# Patient Record
Sex: FEMALE | Race: WHITE | Employment: PART TIME | URBAN - METROPOLITAN AREA
[De-identification: names, ages, dates, MRNs, and addresses within clinical notes are randomized per-mention and may not be internally consistent; named-entity substitution may affect disease eponyms.]

---

## 2017-01-11 ENCOUNTER — HOSPITAL ENCOUNTER (OUTPATIENT)
Dept: OTHER | Age: 67
Discharge: HOME | End: 2017-01-11
Attending: INTERNAL MEDICINE | Admitting: INTERNAL MEDICINE

## 2017-01-11 LAB
ALBUMIN SERPL-MCNC: 3.8 GM/DL (ref 3.4–5)
ALP BLD-CCNC: 77 IU/L (ref 40–129)
ALT SERPL-CCNC: 17 U/L (ref 10–40)
ANION GAP SERPL CALCULATED.3IONS-SCNC: 11 MMOL/L (ref 4–16)
AST SERPL-CCNC: 19 IU/L (ref 15–37)
BILIRUB SERPL-MCNC: 0.3 MG/DL (ref 0–1)
BUN BLDV-MCNC: 15 MG/DL (ref 6–23)
CALCIUM SERPL-MCNC: 8.5 MG/DL (ref 8.3–10.6)
CHLORIDE BLD-SCNC: 107 MMOL/L (ref 99–110)
CO2: 26 MMOL/L (ref 21–32)
CREAT SERPL-MCNC: 0.9 MG/DL (ref 0.6–1.1)
GFR AFRICAN AMERICAN: >60 ML/MIN/1.73M2
GFR NON-AFRICAN AMERICAN: >60 ML/MIN/1.73M2
GLUCOSE FASTING: 93 MG/DL (ref 70–99)
POTASSIUM SERPL-SCNC: 4.2 MMOL/L (ref 3.5–5.1)
SODIUM BLD-SCNC: 144 MMOL/L (ref 135–145)
TOTAL PROTEIN: 6.4 GM/DL (ref 6.4–8.2)

## 2017-01-13 LAB
ALBUMIN ELP: 3 GM/DL (ref 3.2–5.6)
ALPHA-1-GLOBULIN: 0.3 GM/DL (ref 0.1–0.4)
ALPHA-2-GLOBULIN: 0.7 GM/DL (ref 0.4–1.2)
BETA GLOBULIN: 1.3 GM/DL (ref 0.5–1.3)
GAMMA GLOBULIN: 1.1 GM/DL (ref 0.5–1.6)
IMMUNOFIXATION ELECTROPHORESIS 1: NORMAL
PATHOLOGIST: ABNORMAL
TOTAL PROTEIN: 6.4 GM/DL (ref 6.4–8.2)

## 2017-01-15 LAB
KAPPA QUANT FREE LIGHT CHAINS: 5.55
KAPPA/LAMBDA FREE LIGHT CHAIN RATIO: 1.83
LAMBDA FREE LIGHT CHAINS URINE/ VOL: 3.04

## 2017-01-18 ENCOUNTER — HOSPITAL ENCOUNTER (OUTPATIENT)
Dept: OTHER | Age: 67
Discharge: HOME | End: 2017-01-18
Attending: INTERNAL MEDICINE | Admitting: INTERNAL MEDICINE

## 2017-01-18 ENCOUNTER — HOSPITAL ENCOUNTER (OUTPATIENT)
Dept: ULTRASOUND IMAGING | Age: 67
Discharge: HOME | End: 2017-01-18
Attending: INTERNAL MEDICINE | Admitting: INTERNAL MEDICINE

## 2017-01-18 DIAGNOSIS — M79.89 OTHER SPECIFIED SOFT TISSUE DISORDERS: ICD-10-CM

## 2017-01-18 DIAGNOSIS — C90.00 MULTIPLE MYELOMA, WITHOUT MENTION OF HAVING ACHIEVED REMISSION: ICD-10-CM

## 2017-01-18 DIAGNOSIS — M79.89 SWELLING OF LIMB: ICD-10-CM

## 2017-01-18 LAB
ALBUMIN SERPL-MCNC: 3.7 GM/DL (ref 3.4–5)
ALP BLD-CCNC: 83 IU/L (ref 40–128)
ALT SERPL-CCNC: 17 U/L (ref 10–40)
ANION GAP SERPL CALCULATED.3IONS-SCNC: 13 MMOL/L (ref 4–16)
AST SERPL-CCNC: 18 IU/L (ref 15–37)
BILIRUB SERPL-MCNC: 0.4 MG/DL (ref 0–1)
BUN BLDV-MCNC: 21 MG/DL (ref 6–23)
CALCIUM SERPL-MCNC: 9 MG/DL (ref 8.3–10.6)
CHLORIDE BLD-SCNC: 102 MMOL/L (ref 99–110)
CO2: 24 MMOL/L (ref 21–32)
CREAT SERPL-MCNC: 1.1 MG/DL (ref 0.6–1.1)
GFR AFRICAN AMERICAN: >60 ML/MIN/1.73M2
GFR NON-AFRICAN AMERICAN: 50 ML/MIN/1.73M2
GLUCOSE FASTING: 92 MG/DL (ref 70–99)
POTASSIUM SERPL-SCNC: 4.1 MMOL/L (ref 3.5–5.1)
SODIUM BLD-SCNC: 139 MMOL/L (ref 135–145)
TOTAL PROTEIN: 6.7 GM/DL (ref 6.4–8.2)

## 2017-01-20 LAB
ALBUMIN ELP: 3.2 GM/DL (ref 3.2–5.6)
ALPHA-1-GLOBULIN: 0.3 GM/DL (ref 0.1–0.4)
ALPHA-2-GLOBULIN: 0.7 GM/DL (ref 0.4–1.2)
BETA GLOBULIN: 1.3 GM/DL (ref 0.5–1.3)
GAMMA GLOBULIN: 1.2 GM/DL (ref 0.5–1.6)
IMMUNOFIXATION ELECTROPHORESIS 1: NORMAL
PATHOLOGIST: NORMAL
TOTAL PROTEIN: 6.7 GM/DL (ref 6.4–8.2)

## 2017-01-21 LAB
KAPPA QUANT FREE LIGHT CHAINS: 5.31
KAPPA/LAMBDA FREE LIGHT CHAIN RATIO: 1.42
LAMBDA FREE LIGHT CHAINS URINE/ VOL: 3.74

## 2017-02-14 ENCOUNTER — HOSPITAL ENCOUNTER (OUTPATIENT)
Dept: PHYSICAL THERAPY | Age: 67
Discharge: HOME | End: 2017-02-28
Attending: INTERNAL MEDICINE | Admitting: INTERNAL MEDICINE

## 2017-03-01 ENCOUNTER — HOSPITAL ENCOUNTER (OUTPATIENT)
Dept: OTHER | Age: 67
Discharge: HOME | End: 2017-03-01
Attending: INTERNAL MEDICINE | Admitting: INTERNAL MEDICINE

## 2017-03-01 ENCOUNTER — HOSPITAL ENCOUNTER (OUTPATIENT)
Dept: OTHER | Age: 67
Discharge: HOME | End: 2017-03-31
Attending: INTERNAL MEDICINE | Admitting: INTERNAL MEDICINE

## 2017-03-01 LAB
ALBUMIN SERPL-MCNC: 4 GM/DL (ref 3.4–5)
ALP BLD-CCNC: 58 IU/L (ref 40–128)
ALT SERPL-CCNC: 14 U/L (ref 10–40)
ANION GAP SERPL CALCULATED.3IONS-SCNC: 11 MMOL/L (ref 4–16)
AST SERPL-CCNC: 15 IU/L (ref 15–37)
BILIRUB SERPL-MCNC: 0.4 MG/DL (ref 0–1)
BUN BLDV-MCNC: 18 MG/DL (ref 6–23)
CALCIUM SERPL-MCNC: 8.7 MG/DL (ref 8.3–10.6)
CHLORIDE BLD-SCNC: 107 MMOL/L (ref 99–110)
CO2: 26 MMOL/L (ref 21–32)
CREAT SERPL-MCNC: 1 MG/DL (ref 0.6–1.1)
GFR AFRICAN AMERICAN: >60 ML/MIN/1.73M2
GFR NON-AFRICAN AMERICAN: 56 ML/MIN/1.73M2
GLUCOSE FASTING: 98 MG/DL (ref 70–99)
POTASSIUM SERPL-SCNC: 3.9 MMOL/L (ref 3.5–5.1)
SODIUM BLD-SCNC: 144 MMOL/L (ref 135–145)
TOTAL PROTEIN: 6.6 GM/DL (ref 6.4–8.2)

## 2017-03-03 LAB
ALBUMIN ELP: 3.3 GM/DL (ref 3.2–5.6)
ALPHA-1-GLOBULIN: 0.2 GM/DL (ref 0.1–0.4)
ALPHA-2-GLOBULIN: 0.7 GM/DL (ref 0.4–1.2)
BETA GLOBULIN: 1.3 GM/DL (ref 0.5–1.3)
GAMMA GLOBULIN: 1.1 GM/DL (ref 0.5–1.6)
IMMUNOFIXATION ELECTROPHORESIS 1: NORMAL
TOTAL PROTEIN: 6.6 GM/DL (ref 6.4–8.2)

## 2017-03-04 LAB
KAPPA QUANT FREE LIGHT CHAINS: 2.13
KAPPA/LAMBDA FREE LIGHT CHAIN RATIO: 1.34
LAMBDA FREE LIGHT CHAINS URINE/ VOL: 1.59

## 2017-03-08 ENCOUNTER — HOSPITAL ENCOUNTER (OUTPATIENT)
Dept: OTHER | Age: 67
Discharge: HOME | End: 2017-03-08
Attending: INTERNAL MEDICINE | Admitting: INTERNAL MEDICINE

## 2017-03-08 LAB
BANDED NEUTROPHILS ABSOLUTE COUNT: 0.12 K/CU MM
BANDED NEUTROPHILS RELATIVE PERCENT: 5 % (ref 5–11)
DIFFERENTIAL TYPE: ABNORMAL
HCT VFR BLD CALC: 31 % (ref 37–47)
HEMOGLOBIN: 10.1 GM/DL (ref 12.5–16)
LYMPHOCYTES ABSOLUTE: 0.8 K/CU MM
LYMPHOCYTES RELATIVE PERCENT: 34 % (ref 24–44)
MCH RBC QN AUTO: 32.4 PG (ref 27–31)
MCHC RBC AUTO-ENTMCNC: 32.6 % (ref 32–36)
MCV RBC AUTO: 99.4 FL (ref 78–100)
MONOCYTES ABSOLUTE: 0.3 K/CU MM
MONOCYTES RELATIVE PERCENT: 11 % (ref 0–4)
PDW BLD-RTO: 16.1 % (ref 11.7–14.9)
PLATELET # BLD: 111 K/CU MM (ref 140–440)
PMV BLD AUTO: 10.9 FL (ref 7.5–11.1)
RBC # BLD: 3.12 M/CU MM (ref 4.2–5.4)
RBC # BLD: SLIGHT 10*6/UL
SEGMENTED NEUTROPHILS ABSOLUTE COUNT: 1.1 K/CU MM
SEGMENTED NEUTROPHILS RELATIVE PERCENT: 50 % (ref 36–66)
WBC # BLD: 2.3 K/CU MM (ref 4–10.5)

## 2017-04-05 ENCOUNTER — HOSPITAL ENCOUNTER (OUTPATIENT)
Dept: OTHER | Age: 67
Discharge: HOME | End: 2017-04-05
Attending: INTERNAL MEDICINE | Admitting: INTERNAL MEDICINE

## 2017-04-05 LAB
ALBUMIN SERPL-MCNC: 3.9 GM/DL (ref 3.4–5)
ALP BLD-CCNC: 56 IU/L (ref 40–128)
ALT SERPL-CCNC: 14 U/L (ref 10–40)
ANION GAP SERPL CALCULATED.3IONS-SCNC: 13 MMOL/L (ref 4–16)
AST SERPL-CCNC: 15 IU/L (ref 15–37)
BILIRUB SERPL-MCNC: 0.4 MG/DL (ref 0–1)
BUN BLDV-MCNC: 21 MG/DL (ref 6–23)
CALCIUM SERPL-MCNC: 9.2 MG/DL (ref 8.3–10.6)
CHLORIDE BLD-SCNC: 104 MMOL/L (ref 99–110)
CO2: 25 MMOL/L (ref 21–32)
CREAT SERPL-MCNC: 1 MG/DL (ref 0.6–1.1)
GFR AFRICAN AMERICAN: >60 ML/MIN/1.73M2
GFR NON-AFRICAN AMERICAN: 56 ML/MIN/1.73M2
GLUCOSE BLD-MCNC: 95 MG/DL (ref 70–140)
POTASSIUM SERPL-SCNC: 4 MMOL/L (ref 3.5–5.1)
SODIUM BLD-SCNC: 142 MMOL/L (ref 135–145)
TOTAL PROTEIN: 6.4 GM/DL (ref 6.4–8.2)

## 2017-04-07 LAB
IMMUNOFIXATION ELECTROPHORESIS 1: NORMAL
KAPPA QUANT FREE LIGHT CHAINS: 1.8
KAPPA/LAMBDA FREE LIGHT CHAIN RATIO: 1.3
LAMBDA FREE LIGHT CHAINS URINE/ VOL: 1.38

## 2017-04-10 LAB
ALBUMIN ELP: 3.3 GM/DL (ref 3.2–5.6)
ALPHA-1-GLOBULIN: 0.2 GM/DL (ref 0.1–0.4)
ALPHA-2-GLOBULIN: 0.7 GM/DL (ref 0.4–1.2)
BETA GLOBULIN: 1.3 GM/DL (ref 0.5–1.3)
GAMMA GLOBULIN: 0.9 GM/DL (ref 0.5–1.6)
TOTAL PROTEIN: 6.4 GM/DL (ref 6.4–8.2)

## 2017-05-17 ENCOUNTER — HOSPITAL ENCOUNTER (OUTPATIENT)
Dept: OTHER | Age: 67
Discharge: HOME | End: 2017-05-17
Attending: INTERNAL MEDICINE | Admitting: INTERNAL MEDICINE

## 2017-05-17 ENCOUNTER — HOSPITAL ENCOUNTER (OUTPATIENT)
Dept: ULTRASOUND IMAGING | Age: 67
Discharge: HOME | End: 2017-05-17
Attending: INTERNAL MEDICINE | Admitting: INTERNAL MEDICINE

## 2017-05-17 DIAGNOSIS — C90.00 MULTIPLE MYELOMA, WITHOUT MENTION OF HAVING ACHIEVED REMISSION: ICD-10-CM

## 2017-05-17 DIAGNOSIS — M79.89 OTHER SPECIFIED SOFT TISSUE DISORDERS: ICD-10-CM

## 2017-05-17 DIAGNOSIS — M79.89 SWELLING OF LIMB: ICD-10-CM

## 2017-05-17 LAB
ALBUMIN SERPL-MCNC: 3.8 GM/DL (ref 3.4–5)
ALP BLD-CCNC: 53 IU/L (ref 40–129)
ALT SERPL-CCNC: 17 U/L (ref 10–40)
ANION GAP SERPL CALCULATED.3IONS-SCNC: 12 MMOL/L (ref 4–16)
AST SERPL-CCNC: 19 IU/L (ref 15–37)
BILIRUB SERPL-MCNC: 0.5 MG/DL (ref 0–1)
BUN BLDV-MCNC: 22 MG/DL (ref 6–23)
CALCIUM SERPL-MCNC: 8.8 MG/DL (ref 8.3–10.6)
CHLORIDE BLD-SCNC: 104 MMOL/L (ref 99–110)
CO2: 24 MMOL/L (ref 21–32)
CREAT SERPL-MCNC: 1.1 MG/DL (ref 0.6–1.1)
D DIMER: <200 NG/ML(DDU)
FERRITIN: 287 NG/ML (ref 15–150)
FOLATE: >20 NG/ML (ref 3.1–17.5)
GFR AFRICAN AMERICAN: >60 ML/MIN/1.73M2
GFR NON-AFRICAN AMERICAN: 50 ML/MIN/1.73M2
GLUCOSE BLD-MCNC: 94 MG/DL (ref 70–140)
IRON: 131 UG/DL (ref 37–145)
LACTATE DEHYDROGENASE: 200 IU/L (ref 120–246)
PCT TRANSFERRIN: 43 % (ref 10–44)
POTASSIUM SERPL-SCNC: 4 MMOL/L (ref 3.5–5.1)
SODIUM BLD-SCNC: 140 MMOL/L (ref 135–145)
TOTAL IRON BINDING CAPACITY: 306 UG/DL (ref 250–450)
TOTAL PROTEIN: 6.2 GM/DL (ref 6.4–8.2)
UNSATURATED IRON BINDING CAPACITY: 175 UG/DL (ref 110–370)
VITAMIN B-12: 228.6 PG/ML (ref 211–911)

## 2017-05-19 LAB
ALBUMIN ELP: 3.2 GM/DL (ref 3.2–5.6)
ALPHA-1-GLOBULIN: 0.2 GM/DL (ref 0.1–0.4)
ALPHA-2-GLOBULIN: 0.7 GM/DL (ref 0.4–1.2)
BETA GLOBULIN: 1.2 GM/DL (ref 0.5–1.3)
GAMMA GLOBULIN: 0.9 GM/DL (ref 0.5–1.6)
IMMUNOFIXATION ELECTROPHORESIS 1: NORMAL
KAPPA QUANT FREE LIGHT CHAINS: 2.08
KAPPA/LAMBDA FREE LIGHT CHAIN RATIO: 1.28
LAMBDA FREE LIGHT CHAINS URINE/ VOL: 1.63
TOTAL PROTEIN: 6.2 GM/DL (ref 6.4–8.2)

## 2017-06-14 ENCOUNTER — HOSPITAL ENCOUNTER (OUTPATIENT)
Dept: OTHER | Age: 67
Discharge: HOME | End: 2017-06-14
Attending: INTERNAL MEDICINE | Admitting: INTERNAL MEDICINE

## 2017-06-14 ENCOUNTER — HOSPITAL ENCOUNTER (OUTPATIENT)
Dept: GENERAL RADIOLOGY | Age: 67
Discharge: HOME | End: 2017-06-14
Attending: INTERNAL MEDICINE | Admitting: INTERNAL MEDICINE

## 2017-06-14 DIAGNOSIS — R05.9 COUGH: ICD-10-CM

## 2017-06-14 LAB
ANION GAP SERPL CALCULATED.3IONS-SCNC: 13 MMOL/L (ref 4–16)
BUN BLDV-MCNC: 15 MG/DL (ref 6–23)
CALCIUM SERPL-MCNC: 8.8 MG/DL (ref 8.3–10.6)
CHLORIDE BLD-SCNC: 107 MMOL/L (ref 99–110)
CO2: 23 MMOL/L (ref 21–32)
CREAT SERPL-MCNC: 0.9 MG/DL (ref 0.6–1.1)
GFR AFRICAN AMERICAN: >60 ML/MIN/1.73M2
GFR NON-AFRICAN AMERICAN: >60 ML/MIN/1.73M2
GLUCOSE BLD-MCNC: 89 MG/DL (ref 70–140)
POTASSIUM SERPL-SCNC: 4.2 MMOL/L (ref 3.5–5.1)
SODIUM BLD-SCNC: 143 MMOL/L (ref 135–145)

## 2017-06-23 ENCOUNTER — HOSPITAL ENCOUNTER (OUTPATIENT)
Dept: OTHER | Age: 67
Discharge: HOME | End: 2017-06-23
Attending: INTERNAL MEDICINE | Admitting: INTERNAL MEDICINE

## 2017-06-23 LAB
ALBUMIN SERPL-MCNC: 3.7 GM/DL (ref 3.4–5)
ALP BLD-CCNC: 63 IU/L (ref 40–128)
ALT SERPL-CCNC: 16 U/L (ref 10–40)
ANION GAP SERPL CALCULATED.3IONS-SCNC: 14 MMOL/L (ref 4–16)
AST SERPL-CCNC: 19 IU/L (ref 15–37)
BILIRUB SERPL-MCNC: 0.2 MG/DL (ref 0–1)
BUN BLDV-MCNC: 22 MG/DL (ref 6–23)
CALCIUM SERPL-MCNC: 8.9 MG/DL (ref 8.3–10.6)
CHLORIDE BLD-SCNC: 103 MMOL/L (ref 99–110)
CO2: 23 MMOL/L (ref 21–32)
CREAT SERPL-MCNC: 1.1 MG/DL (ref 0.6–1.1)
GFR AFRICAN AMERICAN: >60 ML/MIN/1.73M2
GFR NON-AFRICAN AMERICAN: 50 ML/MIN/1.73M2
GLUCOSE BLD-MCNC: 83 MG/DL (ref 70–140)
POTASSIUM SERPL-SCNC: 3.9 MMOL/L (ref 3.5–5.1)
SODIUM BLD-SCNC: 140 MMOL/L (ref 135–145)
TOTAL PROTEIN: 6.9 GM/DL (ref 6.4–8.2)

## 2017-06-25 LAB
KAPPA QUANT FREE LIGHT CHAINS: 3.03
KAPPA/LAMBDA FREE LIGHT CHAIN RATIO: 1.4
LAMBDA FREE LIGHT CHAINS URINE/ VOL: 2.17

## 2017-06-26 LAB
ALBUMIN ELP: 3.5 GM/DL (ref 3.2–5.6)
ALPHA-1-GLOBULIN: 0.3 GM/DL (ref 0.1–0.4)
ALPHA-2-GLOBULIN: 0.8 GM/DL (ref 0.4–1.2)
BETA GLOBULIN: 1.2 GM/DL (ref 0.5–1.3)
GAMMA GLOBULIN: 1.1 GM/DL (ref 0.5–1.6)
IMMUNOFIXATION ELECTROPHORESIS 1: NORMAL
TOTAL PROTEIN: 6.9 GM/DL (ref 6.4–8.2)

## 2017-08-30 ENCOUNTER — HOSPITAL ENCOUNTER (OUTPATIENT)
Dept: OTHER | Age: 67
Discharge: HOME | End: 2017-08-30
Attending: INTERNAL MEDICINE | Admitting: INTERNAL MEDICINE

## 2017-08-30 LAB
ALBUMIN SERPL-MCNC: 4 GM/DL (ref 3.4–5)
ALP BLD-CCNC: 65 IU/L (ref 40–128)
ALT SERPL-CCNC: 18 U/L (ref 10–40)
ANION GAP SERPL CALCULATED.3IONS-SCNC: 12 MMOL/L (ref 4–16)
AST SERPL-CCNC: 18 IU/L (ref 15–37)
BILIRUB SERPL-MCNC: 0.2 MG/DL (ref 0–1)
BUN BLDV-MCNC: 23 MG/DL (ref 6–23)
CALCIUM SERPL-MCNC: 9 MG/DL (ref 8.3–10.6)
CHLORIDE BLD-SCNC: 104 MMOL/L (ref 99–110)
CO2: 24 MMOL/L (ref 21–32)
CREAT SERPL-MCNC: 1 MG/DL (ref 0.6–1.1)
GFR AFRICAN AMERICAN: >60 ML/MIN/1.73M2
GFR NON-AFRICAN AMERICAN: 56 ML/MIN/1.73M2
GLUCOSE BLD-MCNC: 90 MG/DL (ref 70–140)
POTASSIUM SERPL-SCNC: 4.6 MMOL/L (ref 3.5–5.1)
SODIUM BLD-SCNC: 140 MMOL/L (ref 135–145)
TOTAL PROTEIN: 7.1 GM/DL (ref 6.4–8.2)

## 2017-09-01 LAB
ALBUMIN ELP: 3.5 GM/DL (ref 3.2–5.6)
ALPHA-1-GLOBULIN: 0.3 GM/DL (ref 0.1–0.4)
ALPHA-2-GLOBULIN: 0.8 GM/DL (ref 0.4–1.2)
BETA GLOBULIN: 1.3 GM/DL (ref 0.5–1.3)
GAMMA GLOBULIN: 1.2 GM/DL (ref 0.5–1.6)
IMMUNOFIXATION ELECTROPHORESIS 1: NORMAL
KAPPA QUANT FREE LIGHT CHAINS: 2.56
KAPPA/LAMBDA FREE LIGHT CHAIN RATIO: 1.28
LAMBDA FREE LIGHT CHAINS URINE/ VOL: 2
TOTAL PROTEIN: 7.1 GM/DL (ref 6.4–8.2)

## 2017-09-20 ENCOUNTER — HOSPITAL ENCOUNTER (OUTPATIENT)
Dept: GENERAL RADIOLOGY | Age: 67
Discharge: HOME | End: 2017-09-20
Attending: INTERNAL MEDICINE | Admitting: INTERNAL MEDICINE

## 2017-09-20 ENCOUNTER — HOSPITAL ENCOUNTER (OUTPATIENT)
Dept: MAMMOGRAPHY | Age: 67
Discharge: HOME | End: 2017-09-20
Attending: INTERNAL MEDICINE

## 2017-09-20 DIAGNOSIS — Z12.31 VISIT FOR SCREENING MAMMOGRAM: ICD-10-CM

## 2017-09-21 ENCOUNTER — HOSPITAL ENCOUNTER (OUTPATIENT)
Dept: OTHER | Age: 67
Discharge: HOME | End: 2017-09-21
Attending: INTERNAL MEDICINE | Admitting: INTERNAL MEDICINE

## 2017-09-21 LAB
ANION GAP SERPL CALCULATED.3IONS-SCNC: 14 MMOL/L (ref 4–16)
BUN BLDV-MCNC: 22 MG/DL (ref 6–23)
CALCIUM SERPL-MCNC: 8.2 MG/DL (ref 8.3–10.6)
CHLORIDE BLD-SCNC: 105 MMOL/L (ref 99–110)
CO2: 20 MMOL/L (ref 21–32)
CREAT SERPL-MCNC: 1.1 MG/DL (ref 0.6–1.1)
GFR AFRICAN AMERICAN: >60 ML/MIN/1.73M2
GFR NON-AFRICAN AMERICAN: 50 ML/MIN/1.73M2
GLUCOSE BLD-MCNC: 71 MG/DL (ref 70–140)
POTASSIUM SERPL-SCNC: 4 MMOL/L (ref 3.5–5.1)
SODIUM BLD-SCNC: 139 MMOL/L (ref 135–145)

## 2017-09-27 ENCOUNTER — HOSPITAL ENCOUNTER (OUTPATIENT)
Dept: ULTRASOUND IMAGING | Age: 67
Discharge: HOME | End: 2017-09-27
Attending: INTERNAL MEDICINE | Admitting: INTERNAL MEDICINE

## 2017-09-27 DIAGNOSIS — N64.89 BREAST ASYMMETRY ON EXAMINATION: ICD-10-CM

## 2017-11-22 ENCOUNTER — HOSPITAL ENCOUNTER (OUTPATIENT)
Dept: OTHER | Age: 67
Discharge: HOME | End: 2017-11-22
Attending: INTERNAL MEDICINE | Admitting: INTERNAL MEDICINE

## 2017-11-22 LAB
ALBUMIN SERPL-MCNC: 4 GM/DL (ref 3.4–5)
ALP BLD-CCNC: 77 IU/L (ref 40–128)
ALT SERPL-CCNC: 17 U/L (ref 10–40)
ANION GAP SERPL CALCULATED.3IONS-SCNC: 10 MMOL/L (ref 4–16)
AST SERPL-CCNC: 19 IU/L (ref 15–37)
BILIRUB SERPL-MCNC: 0.4 MG/DL (ref 0–1)
BUN BLDV-MCNC: 29 MG/DL (ref 6–23)
CALCIUM SERPL-MCNC: 8.6 MG/DL (ref 8.3–10.6)
CHLORIDE BLD-SCNC: 104 MMOL/L (ref 99–110)
CO2: 26 MMOL/L (ref 21–32)
CREAT SERPL-MCNC: 1.1 MG/DL (ref 0.6–1.1)
GFR AFRICAN AMERICAN: >60 ML/MIN/1.73M2
GFR NON-AFRICAN AMERICAN: 50 ML/MIN/1.73M2
GLUCOSE BLD-MCNC: 83 MG/DL (ref 70–99)
POTASSIUM SERPL-SCNC: 4.9 MMOL/L (ref 3.5–5.1)
SODIUM BLD-SCNC: 140 MMOL/L (ref 135–145)
TOTAL PROTEIN: 7 GM/DL (ref 6.4–8.2)

## 2017-11-26 LAB
KAPPA QUANT FREE LIGHT CHAINS: 2.57
KAPPA/LAMBDA FREE LIGHT CHAIN RATIO: 1.17
LAMBDA FREE LIGHT CHAINS URINE/ VOL: 2.2

## 2017-11-28 LAB
ALBUMIN ELP: 3.2 GM/DL (ref 3.2–5.6)
ALPHA-1-GLOBULIN: 0.3 GM/DL (ref 0.1–0.4)
ALPHA-2-GLOBULIN: 0.8 GM/DL (ref 0.4–1.2)
BETA GLOBULIN: 1.5 GM/DL (ref 0.5–1.3)
GAMMA GLOBULIN: 1.2 GM/DL (ref 0.5–1.6)
IMMUNOFIXATION ELECTROPHORESIS 1: NORMAL
TOTAL PROTEIN: 7 GM/DL (ref 6.4–8.2)

## 2017-12-19 ENCOUNTER — HOSPITAL ENCOUNTER (OUTPATIENT)
Dept: OTHER | Age: 67
Discharge: HOME | End: 2017-12-19
Attending: INTERNAL MEDICINE | Admitting: INTERNAL MEDICINE

## 2017-12-19 LAB
ALBUMIN SERPL-MCNC: 4.3 GM/DL (ref 3.4–5)
ALP BLD-CCNC: 77 IU/L (ref 40–129)
ALT SERPL-CCNC: 18 U/L (ref 10–40)
ANION GAP SERPL CALCULATED.3IONS-SCNC: 16 MMOL/L (ref 4–16)
AST SERPL-CCNC: 21 IU/L (ref 15–37)
BILIRUB SERPL-MCNC: 0.4 MG/DL (ref 0–1)
BUN BLDV-MCNC: 35 MG/DL (ref 6–23)
CALCIUM SERPL-MCNC: 9.9 MG/DL (ref 8.3–10.6)
CHLORIDE BLD-SCNC: 102 MMOL/L (ref 99–110)
CO2: 23 MMOL/L (ref 21–32)
CREAT SERPL-MCNC: 1.2 MG/DL (ref 0.6–1.1)
GFR AFRICAN AMERICAN: 55 ML/MIN/1.73M2
GFR NON-AFRICAN AMERICAN: 45 ML/MIN/1.73M2
GLUCOSE BLD-MCNC: 79 MG/DL (ref 70–99)
POTASSIUM SERPL-SCNC: 4.7 MMOL/L (ref 3.5–5.1)
SODIUM BLD-SCNC: 141 MMOL/L (ref 135–145)
TOTAL PROTEIN: 7.7 GM/DL (ref 6.4–8.2)

## 2018-02-21 ENCOUNTER — HOSPITAL ENCOUNTER (OUTPATIENT)
Dept: OTHER | Age: 68
Discharge: HOME | End: 2018-02-21
Attending: INTERNAL MEDICINE | Admitting: INTERNAL MEDICINE

## 2018-02-21 LAB
ALBUMIN SERPL-MCNC: 4 GM/DL (ref 3.4–5)
ALP BLD-CCNC: 65 IU/L (ref 40–129)
ALT SERPL-CCNC: 17 U/L (ref 10–40)
ANION GAP SERPL CALCULATED.3IONS-SCNC: 12 MMOL/L (ref 4–16)
AST SERPL-CCNC: 20 IU/L (ref 15–37)
BILIRUB SERPL-MCNC: 0.2 MG/DL (ref 0–1)
BUN BLDV-MCNC: 29 MG/DL (ref 6–23)
CALCIUM SERPL-MCNC: 9.5 MG/DL (ref 8.3–10.6)
CHLORIDE BLD-SCNC: 102 MMOL/L (ref 99–110)
CO2: 28 MMOL/L (ref 21–32)
CREAT SERPL-MCNC: 1.1 MG/DL (ref 0.6–1.1)
GFR AFRICAN AMERICAN: >60 ML/MIN/1.73M2
GFR NON-AFRICAN AMERICAN: 50 ML/MIN/1.73M2
GLUCOSE BLD-MCNC: 92 MG/DL (ref 70–99)
POTASSIUM SERPL-SCNC: 4.2 MMOL/L (ref 3.5–5.1)
SODIUM BLD-SCNC: 142 MMOL/L (ref 135–145)
TOTAL PROTEIN: 7.1 GM/DL (ref 6.4–8.2)

## 2018-02-23 LAB
ALBUMIN ELP: 3.6 GM/DL (ref 3.2–5.6)
ALPHA-1-GLOBULIN: 0.3 GM/DL (ref 0.1–0.4)
ALPHA-2-GLOBULIN: 0.7 GM/DL (ref 0.4–1.2)
BETA GLOBULIN: 1.3 GM/DL (ref 0.5–1.3)
GAMMA GLOBULIN: 1.2 GM/DL (ref 0.5–1.6)
IMMUNOFIXATION ELECTROPHORESIS 1: NORMAL
KAPPA QUANT FREE LIGHT CHAINS: 2.58
KAPPA/LAMBDA FREE LIGHT CHAIN RATIO: 1.25
LAMBDA FREE LIGHT CHAINS URINE/ VOL: 2.07
TOTAL PROTEIN: 7.1 GM/DL (ref 6.4–8.2)

## 2018-03-14 ENCOUNTER — HOSPITAL ENCOUNTER (OUTPATIENT)
Dept: OTHER | Age: 68
Discharge: HOME | End: 2018-03-14
Attending: INTERNAL MEDICINE | Admitting: INTERNAL MEDICINE

## 2018-03-14 LAB
ALBUMIN SERPL-MCNC: 4.1 GM/DL (ref 3.4–5)
ALP BLD-CCNC: 66 IU/L (ref 40–128)
ALT SERPL-CCNC: 18 U/L (ref 10–40)
ANION GAP SERPL CALCULATED.3IONS-SCNC: 12 MMOL/L (ref 4–16)
AST SERPL-CCNC: 21 IU/L (ref 15–37)
BILIRUB SERPL-MCNC: 0.4 MG/DL (ref 0–1)
BUN BLDV-MCNC: 24 MG/DL (ref 6–23)
CALCIUM SERPL-MCNC: 9.2 MG/DL (ref 8.3–10.6)
CHLORIDE BLD-SCNC: 104 MMOL/L (ref 99–110)
CO2: 25 MMOL/L (ref 21–32)
CREAT SERPL-MCNC: 1 MG/DL (ref 0.6–1.1)
GFR AFRICAN AMERICAN: >60 ML/MIN/1.73M2
GFR NON-AFRICAN AMERICAN: 55 ML/MIN/1.73M2
GLUCOSE BLD-MCNC: 85 MG/DL (ref 70–99)
POTASSIUM SERPL-SCNC: 4.6 MMOL/L (ref 3.5–5.1)
SODIUM BLD-SCNC: 141 MMOL/L (ref 135–145)
TOTAL PROTEIN: 7.2 GM/DL (ref 6.4–8.2)

## 2018-03-23 ENCOUNTER — HOSPITAL ENCOUNTER (OUTPATIENT)
Dept: OTHER | Age: 68
Discharge: HOME | End: 2018-03-23
Attending: INTERNAL MEDICINE | Admitting: INTERNAL MEDICINE

## 2018-03-23 LAB
ALBUMIN SERPL-MCNC: 4 GM/DL (ref 3.4–5)
ALP BLD-CCNC: 73 IU/L (ref 40–129)
ALT SERPL-CCNC: 32 U/L (ref 10–40)
ANION GAP SERPL CALCULATED.3IONS-SCNC: 12 MMOL/L (ref 4–16)
AST SERPL-CCNC: 37 IU/L (ref 15–37)
BILIRUB SERPL-MCNC: 0.2 MG/DL (ref 0–1)
BUN BLDV-MCNC: 32 MG/DL (ref 6–23)
CALCIUM SERPL-MCNC: 8.6 MG/DL (ref 8.3–10.6)
CHLORIDE BLD-SCNC: 104 MMOL/L (ref 99–110)
CO2: 24 MMOL/L (ref 21–32)
CREAT SERPL-MCNC: 1.3 MG/DL (ref 0.6–1.1)
GFR AFRICAN AMERICAN: 50 ML/MIN/1.73M2
GFR NON-AFRICAN AMERICAN: 41 ML/MIN/1.73M2
GLUCOSE BLD-MCNC: 85 MG/DL (ref 70–99)
POTASSIUM SERPL-SCNC: 4.3 MMOL/L (ref 3.5–5.1)
SODIUM BLD-SCNC: 140 MMOL/L (ref 135–145)
TOTAL PROTEIN: 6.9 GM/DL (ref 6.4–8.2)

## 2018-05-30 ENCOUNTER — HOSPITAL ENCOUNTER (OUTPATIENT)
Dept: OTHER | Age: 68
Discharge: HOME | End: 2018-05-30
Attending: INTERNAL MEDICINE | Admitting: INTERNAL MEDICINE

## 2018-05-30 LAB
ALBUMIN SERPL-MCNC: 4.2 GM/DL (ref 3.4–5)
ALP BLD-CCNC: 71 IU/L (ref 40–129)
ALT SERPL-CCNC: 17 U/L (ref 10–40)
ANION GAP SERPL CALCULATED.3IONS-SCNC: 13 MMOL/L (ref 4–16)
AST SERPL-CCNC: 18 IU/L (ref 15–37)
BILIRUB SERPL-MCNC: 0.3 MG/DL (ref 0–1)
BUN BLDV-MCNC: 33 MG/DL (ref 6–23)
CALCIUM SERPL-MCNC: 9.9 MG/DL (ref 8.3–10.6)
CHLORIDE BLD-SCNC: 104 MMOL/L (ref 99–110)
CO2: 26 MMOL/L (ref 21–32)
CREAT SERPL-MCNC: 1.1 MG/DL (ref 0.6–1.1)
GFR AFRICAN AMERICAN: >60 ML/MIN/1.73M2
GFR NON-AFRICAN AMERICAN: 50 ML/MIN/1.73M2
GLUCOSE BLD-MCNC: 94 MG/DL (ref 70–99)
LACTATE DEHYDROGENASE: 197 IU/L (ref 120–246)
POTASSIUM SERPL-SCNC: 4.8 MMOL/L (ref 3.5–5.1)
SODIUM BLD-SCNC: 143 MMOL/L (ref 135–145)
TOTAL PROTEIN: 7.2 GM/DL (ref 6.4–8.2)

## 2018-06-01 LAB
ALBUMIN ELP: 3.7 GM/DL (ref 3.2–5.6)
ALPHA-1-GLOBULIN: 0.3 GM/DL (ref 0.1–0.4)
ALPHA-2-GLOBULIN: 0.8 GM/DL (ref 0.4–1.2)
BETA GLOBULIN: 1.3 GM/DL (ref 0.5–1.3)
GAMMA GLOBULIN: 1.2 GM/DL (ref 0.5–1.6)
IMMUNOFIXATION ELECTROPHORESIS 1: NORMAL
TOTAL PROTEIN: 7.2 GM/DL (ref 6.4–8.2)

## 2018-06-02 LAB
KAPPA QUANT FREE LIGHT CHAINS: 2.28
KAPPA/LAMBDA FREE LIGHT CHAIN RATIO: 0.97
LAMBDA FREE LIGHT CHAINS URINE/ VOL: 2.36

## 2018-09-05 ENCOUNTER — HOSPITAL ENCOUNTER (OUTPATIENT)
Dept: OTHER | Age: 68
Discharge: HOME | End: 2018-09-05
Attending: INTERNAL MEDICINE | Admitting: INTERNAL MEDICINE

## 2018-09-05 LAB
ALBUMIN SERPL-MCNC: 3.9 GM/DL (ref 3.4–5)
ALP BLD-CCNC: 68 IU/L (ref 40–129)
ALT SERPL-CCNC: 14 U/L (ref 10–40)
ANION GAP SERPL CALCULATED.3IONS-SCNC: 8 MMOL/L (ref 4–16)
AST SERPL-CCNC: 18 IU/L (ref 15–37)
BILIRUB SERPL-MCNC: 0.3 MG/DL (ref 0–1)
BUN BLDV-MCNC: 25 MG/DL (ref 6–23)
CALCIUM SERPL-MCNC: 9.9 MG/DL (ref 8.3–10.6)
CHLORIDE BLD-SCNC: 110 MMOL/L (ref 99–110)
CO2: 26 MMOL/L (ref 21–32)
CREAT SERPL-MCNC: 1.1 MG/DL (ref 0.6–1.1)
GFR AFRICAN AMERICAN: >60 ML/MIN/1.73M2
GFR NON-AFRICAN AMERICAN: 50 ML/MIN/1.73M2
GLUCOSE BLD-MCNC: 88 MG/DL (ref 70–99)
LACTATE DEHYDROGENASE: 212 IU/L (ref 120–246)
POTASSIUM SERPL-SCNC: 4.9 MMOL/L (ref 3.5–5.1)
SODIUM BLD-SCNC: 144 MMOL/L (ref 135–145)
TOTAL PROTEIN: 7 GM/DL (ref 6.4–8.2)

## 2018-09-07 LAB
ALBUMIN ELP: 3.5 GM/DL (ref 3.2–5.6)
ALPHA-1-GLOBULIN: 0.3 GM/DL (ref 0.1–0.4)
ALPHA-2-GLOBULIN: 0.7 GM/DL (ref 0.4–1.2)
BETA GLOBULIN: 1.4 GM/DL (ref 0.5–1.3)
GAMMA GLOBULIN: 1.1 GM/DL (ref 0.5–1.6)
KAPPA QUANT FREE LIGHT CHAINS: 2.22
KAPPA/LAMBDA FREE LIGHT CHAIN RATIO: 1.05
LAMBDA FREE LIGHT CHAINS URINE/ VOL: 2.11
TOTAL PROTEIN: 7 GM/DL (ref 6.4–8.2)

## 2018-09-28 ENCOUNTER — HOSPITAL ENCOUNTER (OUTPATIENT)
Dept: WOMENS IMAGING | Age: 68
Discharge: HOME | End: 2018-09-28
Payer: MEDICARE

## 2018-09-28 DIAGNOSIS — Z12.31 SCREENING MAMMOGRAM, ENCOUNTER FOR: ICD-10-CM

## 2018-09-28 PROCEDURE — 77067 SCR MAMMO BI INCL CAD: CPT

## 2018-10-31 ENCOUNTER — APPOINTMENT (OUTPATIENT)
Dept: GENERAL RADIOLOGY | Age: 68
End: 2018-10-31
Payer: MEDICARE

## 2018-10-31 ENCOUNTER — HOSPITAL ENCOUNTER (EMERGENCY)
Age: 68
Discharge: HOME | End: 2018-10-31
Attending: EMERGENCY MEDICINE
Payer: MEDICARE

## 2018-10-31 ENCOUNTER — APPOINTMENT (OUTPATIENT)
Dept: ULTRASOUND IMAGING | Age: 68
End: 2018-10-31
Payer: MEDICARE

## 2018-10-31 VITALS
OXYGEN SATURATION: 98 % | DIASTOLIC BLOOD PRESSURE: 77 MMHG | WEIGHT: 232 LBS | BODY MASS INDEX: 36.41 KG/M2 | HEIGHT: 67 IN | TEMPERATURE: 99.6 F | HEART RATE: 94 BPM | RESPIRATION RATE: 16 BRPM | SYSTOLIC BLOOD PRESSURE: 171 MMHG

## 2018-10-31 DIAGNOSIS — Z86.718 HISTORY OF DVT (DEEP VEIN THROMBOSIS): ICD-10-CM

## 2018-10-31 DIAGNOSIS — M79.89 LEG SWELLING: Primary | ICD-10-CM

## 2018-10-31 DIAGNOSIS — L03.90 CELLULITIS, UNSPECIFIED CELLULITIS SITE: ICD-10-CM

## 2018-10-31 LAB
ALBUMIN SERPL-MCNC: 4 GM/DL (ref 3.4–5)
ALP BLD-CCNC: 85 IU/L (ref 40–129)
ALT SERPL-CCNC: 21 U/L (ref 10–40)
ANION GAP SERPL CALCULATED.3IONS-SCNC: 13 MMOL/L (ref 4–16)
APTT: 29.7 SECONDS (ref 24–40)
AST SERPL-CCNC: 26 IU/L (ref 15–37)
BACTERIA: ABNORMAL /HPF
BASOPHILS ABSOLUTE: 0 K/CU MM
BASOPHILS RELATIVE PERCENT: 0.1 % (ref 0–1)
BILIRUB SERPL-MCNC: 0.4 MG/DL (ref 0–1)
BILIRUBIN URINE: NEGATIVE MG/DL
BLOOD, URINE: ABNORMAL
BUN BLDV-MCNC: 18 MG/DL (ref 6–23)
CALCIUM SERPL-MCNC: 9.4 MG/DL (ref 8.3–10.6)
CAST TYPE: ABNORMAL /HPF
CHLORIDE BLD-SCNC: 101 MMOL/L (ref 99–110)
CLARITY: CLEAR
CO2: 26 MMOL/L (ref 21–32)
COLOR: YELLOW
CREAT SERPL-MCNC: 1 MG/DL (ref 0.6–1.1)
CRYSTAL TYPE: ABNORMAL /HPF
DIFFERENTIAL TYPE: ABNORMAL
EOSINOPHILS ABSOLUTE: 0 K/CU MM
EOSINOPHILS RELATIVE PERCENT: 0.6 % (ref 0–3)
EPITHELIAL CELLS, UA: ABNORMAL /HPF
GFR AFRICAN AMERICAN: >60 ML/MIN/1.73M2
GFR NON-AFRICAN AMERICAN: 55 ML/MIN/1.73M2
GLUCOSE BLD-MCNC: 101 MG/DL (ref 70–99)
GLUCOSE, URINE: NEGATIVE MG/DL
HCT VFR BLD CALC: 38.3 % (ref 37–47)
HEMOGLOBIN: 11.8 GM/DL (ref 12.5–16)
IMMATURE NEUTROPHIL %: 0.3 % (ref 0–0.43)
INR BLD: 1.12 INDEX
KETONES, URINE: ABNORMAL MG/DL
LACTATE: 1 MMOL/L (ref 0.4–2)
LEUKOCYTE ESTERASE, URINE: NEGATIVE
LYMPHOCYTES ABSOLUTE: 1.2 K/CU MM
LYMPHOCYTES RELATIVE PERCENT: 17.3 % (ref 24–44)
MCH RBC QN AUTO: 29.1 PG (ref 27–31)
MCHC RBC AUTO-ENTMCNC: 30.8 % (ref 32–36)
MCV RBC AUTO: 94.6 FL (ref 78–100)
MONOCYTES ABSOLUTE: 0.8 K/CU MM
MONOCYTES RELATIVE PERCENT: 11.6 % (ref 0–4)
MUCUS: ABNORMAL HPF
NITRITE URINE, QUANTITATIVE: NEGATIVE
PDW BLD-RTO: 15.3 % (ref 11.7–14.9)
PH, URINE: 5.5 (ref 5–8)
PLATELET # BLD: 166 K/CU MM (ref 140–440)
PMV BLD AUTO: 10.2 FL (ref 7.5–11.1)
POTASSIUM SERPL-SCNC: 4.3 MMOL/L (ref 3.5–5.1)
PRO-BNP: 312.8 PG/ML
PROTEIN UA: 30 MG/DL
PROTHROMBIN TIME: 12.8 SECONDS (ref 9.12–12.5)
RBC # BLD: 4.05 M/CU MM (ref 4.2–5.4)
RBC URINE: ABNORMAL /HPF (ref 0–6)
SEGMENTED NEUTROPHILS ABSOLUTE COUNT: 4.9 K/CU MM
SEGMENTED NEUTROPHILS RELATIVE PERCENT: 70.1 % (ref 36–66)
SODIUM BLD-SCNC: 140 MMOL/L (ref 135–145)
SPECIFIC GRAVITY UA: 1.02 (ref 1–1.03)
TOTAL CK: 99 IU/L (ref 26–140)
TOTAL IMMATURE NEUTOROPHIL: 0.02 K/CU MM
TOTAL PROTEIN: 7.2 GM/DL (ref 6.4–8.2)
TSH HIGH SENSITIVITY: 2.34 UIU/ML (ref 0.27–4.2)
UROBILINOGEN, URINE: 0.2 MG/DL (ref 0.2–1)
VOLUME, (UVOL): 12 ML (ref 10–12)
WBC # BLD: 7 K/CU MM (ref 4–10.5)
WBC UA: ABNORMAL /HPF (ref 0–5)

## 2018-10-31 PROCEDURE — 81001 URINALYSIS AUTO W/SCOPE: CPT

## 2018-10-31 PROCEDURE — 83605 ASSAY OF LACTIC ACID: CPT

## 2018-10-31 PROCEDURE — 83880 ASSAY OF NATRIURETIC PEPTIDE: CPT

## 2018-10-31 PROCEDURE — 85610 PROTHROMBIN TIME: CPT

## 2018-10-31 PROCEDURE — 93970 EXTREMITY STUDY: CPT

## 2018-10-31 PROCEDURE — 85025 COMPLETE CBC W/AUTO DIFF WBC: CPT

## 2018-10-31 PROCEDURE — 82550 ASSAY OF CK (CPK): CPT

## 2018-10-31 PROCEDURE — 73590 X-RAY EXAM OF LOWER LEG: CPT

## 2018-10-31 PROCEDURE — 80053 COMPREHEN METABOLIC PANEL: CPT

## 2018-10-31 PROCEDURE — 85730 THROMBOPLASTIN TIME PARTIAL: CPT

## 2018-10-31 PROCEDURE — 99284 EMERGENCY DEPT VISIT MOD MDM: CPT

## 2018-10-31 PROCEDURE — 84443 ASSAY THYROID STIM HORMONE: CPT

## 2018-10-31 RX ORDER — SULFAMETHOXAZOLE AND TRIMETHOPRIM 800; 160 MG/1; MG/1
1 TABLET ORAL 2 TIMES DAILY
Qty: 20 TABLET | Refills: 0 | Status: SHIPPED | OUTPATIENT
Start: 2018-10-31 | End: 2018-11-10

## 2018-10-31 RX ORDER — CEPHALEXIN 500 MG/1
500 CAPSULE ORAL 4 TIMES DAILY
Qty: 28 CAPSULE | Refills: 0 | Status: SHIPPED | OUTPATIENT
Start: 2018-10-31 | End: 2018-11-10

## 2018-10-31 ASSESSMENT — ENCOUNTER SYMPTOMS
EYES NEGATIVE: 1
RESPIRATORY NEGATIVE: 1
ALLERGIC/IMMUNOLOGIC NEGATIVE: 1
GASTROINTESTINAL NEGATIVE: 1

## 2018-10-31 ASSESSMENT — PAIN SCALES - GENERAL
PAINLEVEL_OUTOF10: 3
PAINLEVEL_OUTOF10: 3

## 2018-10-31 ASSESSMENT — PAIN DESCRIPTION - ORIENTATION: ORIENTATION: RIGHT

## 2018-10-31 ASSESSMENT — PAIN DESCRIPTION - PAIN TYPE: TYPE: ACUTE PAIN

## 2018-10-31 ASSESSMENT — PAIN DESCRIPTION - DESCRIPTORS: DESCRIPTORS: ACHING

## 2018-10-31 ASSESSMENT — PAIN DESCRIPTION - LOCATION: LOCATION: LEG

## 2018-10-31 NOTE — ED PROVIDER NOTES
current facility-administered medications for this encounter. Current Outpatient Prescriptions   Medication Sig Dispense Refill    aspirin 81 MG tablet Take 81 mg by mouth daily      Metoprolol Succinate 50 MG CS24 Take 50 mg by mouth daily      Zoledronic Acid (ZOMETA IV) Infuse intravenously every 6 months      sulfamethoxazole-trimethoprim (BACTRIM DS) 800-160 MG per tablet Take 1 tablet by mouth 2 times daily for 10 days 20 tablet 0    cephALEXin (KEFLEX) 500 MG capsule Take 1 capsule by mouth 4 times daily for 10 days 28 capsule 0       Nursing Notes Reviewed    VITAL SIGNS:  ED Triage Vitals   Enc Vitals Group      BP       Pulse       Resp       Temp       Temp src       SpO2       Weight       Height       Head Circumference       Peak Flow       Pain Score       Pain Loc       Pain Edu? Excl. in [address]? PHYSICAL EXAM:  Physical Exam   Constitutional: She is oriented to person, place, and time. She appears well-developed and well-nourished. She is cooperative. Non-toxic appearance. She does not have a sickly appearance. She does not appear ill. No distress. HENT:   Head: Normocephalic and atraumatic. Right Ear: External ear normal.   Left Ear: External ear normal.   Nose: Nose normal.   Mouth/Throat: Oropharynx is clear and moist. No oropharyngeal exudate. Eyes: Pupils are equal, round, and reactive to light. Conjunctivae and EOM are normal. Right eye exhibits no discharge. Left eye exhibits no discharge. No scleral icterus. Neck: Normal range of motion. No JVD present. Cardiovascular: Normal rate, regular rhythm, normal heart sounds and intact distal pulses. Exam reveals no gallop and no friction rub. No murmur heard. Pulmonary/Chest: Effort normal and breath sounds normal. No stridor. No respiratory distress. She has no wheezes. She has no rales. Abdominal: Soft. Bowel sounds are normal. She exhibits no distension and no mass. There is no tenderness.  There is no

## 2019-01-08 ENCOUNTER — HOSPITAL ENCOUNTER (OUTPATIENT)
Age: 69
Setting detail: SPECIMEN
Discharge: HOME | End: 2019-01-08
Payer: MEDICARE

## 2019-01-08 LAB
ALBUMIN SERPL-MCNC: 4 GM/DL (ref 3.4–5)
ALP BLD-CCNC: 66 IU/L (ref 40–129)
ALT SERPL-CCNC: 13 U/L (ref 10–40)
ANION GAP SERPL CALCULATED.3IONS-SCNC: 11 MMOL/L (ref 4–16)
AST SERPL-CCNC: 16 IU/L (ref 15–37)
BILIRUB SERPL-MCNC: 0.3 MG/DL (ref 0–1)
BUN BLDV-MCNC: 25 MG/DL (ref 6–23)
CALCIUM SERPL-MCNC: 9.5 MG/DL (ref 8.3–10.6)
CHLORIDE BLD-SCNC: 100 MMOL/L (ref 99–110)
CO2: 29 MMOL/L (ref 21–32)
CREAT SERPL-MCNC: 1 MG/DL (ref 0.6–1.1)
GFR AFRICAN AMERICAN: >60 ML/MIN/1.73M2
GFR NON-AFRICAN AMERICAN: 55 ML/MIN/1.73M2
GLUCOSE BLD-MCNC: 110 MG/DL (ref 70–99)
LACTATE DEHYDROGENASE: 180 IU/L (ref 120–246)
POTASSIUM SERPL-SCNC: 4.5 MMOL/L (ref 3.5–5.1)
SODIUM BLD-SCNC: 140 MMOL/L (ref 135–145)
TOTAL PROTEIN: 7.2 GM/DL (ref 6.4–8.2)

## 2019-01-08 PROCEDURE — 80053 COMPREHEN METABOLIC PANEL: CPT

## 2019-01-08 PROCEDURE — 84165 PROTEIN E-PHORESIS SERUM: CPT

## 2019-01-08 PROCEDURE — 83883 ASSAY NEPHELOMETRY NOT SPEC: CPT

## 2019-01-08 PROCEDURE — 86320 SERUM IMMUNOELECTROPHORESIS: CPT

## 2019-01-08 PROCEDURE — 83615 LACTATE (LD) (LDH) ENZYME: CPT

## 2019-01-09 LAB
ALBUMIN ELP: 3.6 GM/DL (ref 3.2–5.6)
ALPHA-1-GLOBULIN: 0.2 GM/DL (ref 0.1–0.4)
ALPHA-2-GLOBULIN: 0.7 GM/DL (ref 0.4–1.2)
BETA GLOBULIN: 1.5 GM/DL (ref 0.5–1.3)
GAMMA GLOBULIN: 1.1 GM/DL (ref 0.5–1.6)
TOTAL PROTEIN: 7.2 GM/DL (ref 6.4–8.2)

## 2019-01-11 LAB
KAPPA QUANT FREE LIGHT CHAINS: 2.13
KAPPA/LAMBDA FREE LIGHT CHAIN RATIO: 1.07
LAMBDA FREE LIGHT CHAINS URINE/ VOL: 1.99

## 2019-05-15 ENCOUNTER — HOSPITAL ENCOUNTER (OUTPATIENT)
Age: 69
Setting detail: SPECIMEN
Discharge: HOME | End: 2019-05-15
Payer: MEDICARE

## 2019-05-15 LAB
ALBUMIN SERPL-MCNC: 4.1 GM/DL (ref 3.4–5)
ALP BLD-CCNC: 73 IU/L (ref 40–129)
ALT SERPL-CCNC: 15 U/L (ref 10–40)
ANION GAP SERPL CALCULATED.3IONS-SCNC: 12 MMOL/L (ref 4–16)
AST SERPL-CCNC: 15 IU/L (ref 15–37)
BILIRUB SERPL-MCNC: 0.3 MG/DL (ref 0–1)
BUN BLDV-MCNC: 27 MG/DL (ref 6–23)
CALCIUM SERPL-MCNC: 10.2 MG/DL (ref 8.3–10.6)
CHLORIDE BLD-SCNC: 104 MMOL/L (ref 99–110)
CO2: 27 MMOL/L (ref 21–32)
CREAT SERPL-MCNC: 1.1 MG/DL (ref 0.6–1.1)
GFR AFRICAN AMERICAN: 60 ML/MIN/1.73M2
GFR NON-AFRICAN AMERICAN: 50 ML/MIN/1.73M2
GLUCOSE BLD-MCNC: 91 MG/DL (ref 70–99)
LACTATE DEHYDROGENASE: 174 IU/L (ref 120–246)
POTASSIUM SERPL-SCNC: 5.3 MMOL/L (ref 3.5–5.1)
SODIUM BLD-SCNC: 143 MMOL/L (ref 135–145)
TOTAL PROTEIN: 7.3 GM/DL (ref 6.4–8.2)

## 2019-05-15 PROCEDURE — 80053 COMPREHEN METABOLIC PANEL: CPT

## 2019-05-15 PROCEDURE — 83615 LACTATE (LD) (LDH) ENZYME: CPT

## 2019-05-15 PROCEDURE — 84165 PROTEIN E-PHORESIS SERUM: CPT

## 2019-05-15 PROCEDURE — 83883 ASSAY NEPHELOMETRY NOT SPEC: CPT

## 2019-05-15 PROCEDURE — 86320 SERUM IMMUNOELECTROPHORESIS: CPT

## 2019-05-16 LAB
ALBUMIN ELP: 3.7 GM/DL (ref 3.2–5.6)
ALPHA-1-GLOBULIN: 0.3 GM/DL (ref 0.1–0.4)
ALPHA-2-GLOBULIN: 0.8 GM/DL (ref 0.4–1.2)
BETA GLOBULIN: 1.3 GM/DL (ref 0.5–1.3)
GAMMA GLOBULIN: 1.2 GM/DL (ref 0.5–1.6)
SPEP INTERPRETATION: NORMAL
SPEP INTERPRETATION: NORMAL
TOTAL PROTEIN: 7.3 GM/DL (ref 6.4–8.2)

## 2019-05-17 LAB
KAPPA QUANT FREE LIGHT CHAINS: 2.34 MG/DL (ref 0.33–1.94)
KAPPA/LAMBDA FREE LIGHT CHAIN RATIO: 1.34 (ref 0.26–1.65)
KAPPA/LAMBDA FREE LIGHT CHAIN RATIO: ABNORMAL (ref 0.26–1.65)
LAMBDA FREE LIGHT CHAINS URINE/ VOL: 1.75 MG/DL (ref 0.57–2.63)

## 2019-09-07 ENCOUNTER — HOSPITAL ENCOUNTER (OUTPATIENT)
Dept: MRI IMAGING | Age: 69
Discharge: HOME | End: 2019-09-07
Payer: MEDICARE

## 2019-09-07 DIAGNOSIS — M25.562 LEFT KNEE PAIN, UNSPECIFIED CHRONICITY: ICD-10-CM

## 2019-09-07 PROCEDURE — 73721 MRI JNT OF LWR EXTRE W/O DYE: CPT

## 2019-10-02 ENCOUNTER — HOSPITAL ENCOUNTER (OUTPATIENT)
Dept: WOMENS IMAGING | Age: 69
Discharge: HOME | End: 2019-10-02
Payer: MEDICARE

## 2019-10-02 DIAGNOSIS — Z12.39 BREAST CANCER SCREENING: ICD-10-CM

## 2019-10-02 PROCEDURE — 77067 SCR MAMMO BI INCL CAD: CPT

## 2019-11-06 ENCOUNTER — HOSPITAL ENCOUNTER (OUTPATIENT)
Age: 69
Setting detail: SPECIMEN
Discharge: HOME | End: 2019-11-06
Payer: MEDICARE

## 2019-11-06 LAB
ALBUMIN SERPL-MCNC: 4.1 GM/DL (ref 3.4–5)
ALP BLD-CCNC: 71 IU/L (ref 40–129)
ALT SERPL-CCNC: 15 U/L (ref 10–40)
ANION GAP SERPL CALCULATED.3IONS-SCNC: 11 MMOL/L (ref 4–16)
AST SERPL-CCNC: 19 IU/L (ref 15–37)
BILIRUB SERPL-MCNC: 0.4 MG/DL (ref 0–1)
BUN BLDV-MCNC: 18 MG/DL (ref 6–23)
CALCIUM SERPL-MCNC: 10.1 MG/DL (ref 8.3–10.6)
CHLORIDE BLD-SCNC: 105 MMOL/L (ref 99–110)
CO2: 26 MMOL/L (ref 21–32)
CREAT SERPL-MCNC: 1 MG/DL (ref 0.6–1.1)
GFR AFRICAN AMERICAN: >60 ML/MIN/1.73M2
GFR NON-AFRICAN AMERICAN: 55 ML/MIN/1.73M2
GLUCOSE BLD-MCNC: 82 MG/DL (ref 70–99)
LACTATE DEHYDROGENASE: 221 IU/L (ref 120–246)
POTASSIUM SERPL-SCNC: 5 MMOL/L (ref 3.5–5.1)
SODIUM BLD-SCNC: 142 MMOL/L (ref 135–145)
TOTAL PROTEIN: 7.3 GM/DL (ref 6.4–8.2)

## 2019-11-06 PROCEDURE — 84165 PROTEIN E-PHORESIS SERUM: CPT

## 2019-11-06 PROCEDURE — 80053 COMPREHEN METABOLIC PANEL: CPT

## 2019-11-06 PROCEDURE — 83883 ASSAY NEPHELOMETRY NOT SPEC: CPT

## 2019-11-06 PROCEDURE — 83615 LACTATE (LD) (LDH) ENZYME: CPT

## 2019-11-06 PROCEDURE — 86320 SERUM IMMUNOELECTROPHORESIS: CPT

## 2019-11-08 LAB
ALBUMIN ELP: 3.6 GM/DL (ref 3.2–5.6)
ALPHA-1-GLOBULIN: 0.3 GM/DL (ref 0.1–0.4)
ALPHA-2-GLOBULIN: 0.8 GM/DL (ref 0.4–1.2)
BETA GLOBULIN: 1.4 GM/DL (ref 0.5–1.3)
GAMMA GLOBULIN: 1.2 GM/DL (ref 0.5–1.6)
SPEP INTERPRETATION: ABNORMAL
SPEP INTERPRETATION: NORMAL
TOTAL PROTEIN: 7.3 GM/DL (ref 6.4–8.2)

## 2019-11-09 LAB
KAPPA QUANT FREE LIGHT CHAINS: 3.05 MG/DL (ref 0.33–1.94)
KAPPA/LAMBDA FREE LIGHT CHAIN RATIO: 1.09 (ref 0.26–1.65)
KAPPA/LAMBDA FREE LIGHT CHAIN RATIO: ABNORMAL (ref 0.26–1.65)
LAMBDA FREE LIGHT CHAINS URINE/ VOL: 2.79 MG/DL (ref 0.57–2.63)

## 2020-02-05 ENCOUNTER — HOSPITAL ENCOUNTER (OUTPATIENT)
Dept: INFUSION THERAPY | Age: 70
Discharge: HOME | End: 2020-02-05
Payer: MEDICARE

## 2020-02-05 LAB
ALBUMIN SERPL-MCNC: 3.8 GM/DL (ref 3.4–5)
ALP BLD-CCNC: 76 IU/L (ref 40–128)
ALT SERPL-CCNC: 16 U/L (ref 10–40)
ANION GAP SERPL CALCULATED.3IONS-SCNC: 10 MMOL/L (ref 4–16)
AST SERPL-CCNC: 18 IU/L (ref 15–37)
BILIRUB SERPL-MCNC: 0.4 MG/DL (ref 0–1)
BUN BLDV-MCNC: 25 MG/DL (ref 6–23)
CALCIUM SERPL-MCNC: 9.3 MG/DL (ref 8.3–10.6)
CHLORIDE BLD-SCNC: 103 MMOL/L (ref 99–110)
CO2: 25 MMOL/L (ref 21–32)
CREAT SERPL-MCNC: 0.9 MG/DL (ref 0.6–1.1)
DIFFERENTIAL TYPE: ABNORMAL
EOSINOPHILS ABSOLUTE: 0.1 K/CU MM
EOSINOPHILS RELATIVE PERCENT: 2 % (ref 0–3)
GFR AFRICAN AMERICAN: >60 ML/MIN/1.73M2
GFR NON-AFRICAN AMERICAN: >60 ML/MIN/1.73M2
GLUCOSE BLD-MCNC: 89 MG/DL (ref 70–99)
HCT VFR BLD CALC: 37.1 % (ref 37–47)
HEMOGLOBIN: 11.8 GM/DL (ref 12.5–16)
LACTATE DEHYDROGENASE: 202 IU/L (ref 120–246)
LYMPHOCYTES ABSOLUTE: 1.7 K/CU MM
LYMPHOCYTES RELATIVE PERCENT: 28 % (ref 24–44)
MCH RBC QN AUTO: 28.9 PG (ref 27–31)
MCHC RBC AUTO-ENTMCNC: 31.8 % (ref 32–36)
MCV RBC AUTO: 90.7 FL (ref 78–100)
MONOCYTES ABSOLUTE: 0.7 K/CU MM
MONOCYTES RELATIVE PERCENT: 11 % (ref 0–4)
PDW BLD-RTO: 16 % (ref 11.7–14.9)
PLATELET # BLD: 198 K/CU MM (ref 140–440)
PMV BLD AUTO: 9.5 FL (ref 7.5–11.1)
POTASSIUM SERPL-SCNC: 4.8 MMOL/L (ref 3.5–5.1)
RBC # BLD: 4.09 M/CU MM (ref 4.2–5.4)
SEGMENTED NEUTROPHILS ABSOLUTE COUNT: 3.6 K/CU MM
SEGMENTED NEUTROPHILS RELATIVE PERCENT: 59 % (ref 36–66)
SODIUM BLD-SCNC: 138 MMOL/L (ref 135–145)
TOTAL PROTEIN: 7 GM/DL (ref 6.4–8.2)
WBC # BLD: 6.1 K/CU MM (ref 4–10.5)

## 2020-02-05 PROCEDURE — 86320 SERUM IMMUNOELECTROPHORESIS: CPT

## 2020-02-05 PROCEDURE — 80053 COMPREHEN METABOLIC PANEL: CPT

## 2020-02-05 PROCEDURE — 84165 PROTEIN E-PHORESIS SERUM: CPT

## 2020-02-05 PROCEDURE — 83883 ASSAY NEPHELOMETRY NOT SPEC: CPT

## 2020-02-05 PROCEDURE — 36415 COLL VENOUS BLD VENIPUNCTURE: CPT

## 2020-02-05 PROCEDURE — 85025 COMPLETE CBC W/AUTO DIFF WBC: CPT

## 2020-02-05 PROCEDURE — 83615 LACTATE (LD) (LDH) ENZYME: CPT

## 2020-02-07 LAB
ALBUMIN ELP: 3.3 GM/DL (ref 3.2–5.6)
ALPHA-1-GLOBULIN: 0.3 GM/DL (ref 0.1–0.4)
ALPHA-2-GLOBULIN: 0.8 GM/DL (ref 0.4–1.2)
BETA GLOBULIN: 1.5 GM/DL (ref 0.5–1.3)
GAMMA GLOBULIN: 1.1 GM/DL (ref 0.5–1.6)
SPEP INTERPRETATION: ABNORMAL
SPEP INTERPRETATION: NORMAL
TOTAL PROTEIN: 7 GM/DL (ref 6.4–8.2)

## 2020-02-08 LAB
KAPPA QUANT FREE LIGHT CHAINS: 3.16 MG/DL (ref 0.33–1.94)
KAPPA/LAMBDA FREE LIGHT CHAIN RATIO: 0.93 (ref 0.26–1.65)
KAPPA/LAMBDA FREE LIGHT CHAIN RATIO: ABNORMAL (ref 0.26–1.65)
LAMBDA FREE LIGHT CHAINS URINE/ VOL: 3.4 MG/DL (ref 0.57–2.63)

## 2020-03-31 PROBLEM — Z12.31 ENCOUNTER FOR SCREENING MAMMOGRAM FOR MALIGNANT NEOPLASM OF BREAST: Status: ACTIVE | Noted: 2020-03-31

## 2020-03-31 PROBLEM — C90.00 MULTIPLE MYELOMA NOT HAVING ACHIEVED REMISSION (HCC): Status: ACTIVE | Noted: 2020-03-31

## 2020-03-31 PROBLEM — R23.2 FLUSHING: Status: ACTIVE | Noted: 2020-03-31

## 2020-03-31 PROBLEM — G89.3 NEOPLASM RELATED PAIN (ACUTE) (CHRONIC): Status: ACTIVE | Noted: 2020-03-31

## 2020-04-30 PROBLEM — Z12.31 ENCOUNTER FOR SCREENING MAMMOGRAM FOR MALIGNANT NEOPLASM OF BREAST: Status: RESOLVED | Noted: 2020-03-31 | Resolved: 2020-04-30

## 2020-05-06 ENCOUNTER — HOSPITAL ENCOUNTER (OUTPATIENT)
Dept: INFUSION THERAPY | Age: 70
Discharge: HOME | End: 2020-05-06
Payer: MEDICARE

## 2020-05-06 LAB
ALBUMIN SERPL-MCNC: 4 GM/DL (ref 3.4–5)
ALP BLD-CCNC: 72 IU/L (ref 40–128)
ALT SERPL-CCNC: 15 U/L (ref 10–40)
ANION GAP SERPL CALCULATED.3IONS-SCNC: 10 MMOL/L (ref 4–16)
AST SERPL-CCNC: 21 IU/L (ref 15–37)
BACTERIA: ABNORMAL /HPF
BASOPHILS ABSOLUTE: 0 K/CU MM
BASOPHILS RELATIVE PERCENT: 0.4 % (ref 0–1)
BILIRUB SERPL-MCNC: 0.3 MG/DL (ref 0–1)
BILIRUBIN URINE: NEGATIVE MG/DL
BLOOD, URINE: NEGATIVE
BUN BLDV-MCNC: 30 MG/DL (ref 6–23)
CALCIUM SERPL-MCNC: 9.2 MG/DL (ref 8.3–10.6)
CHLORIDE BLD-SCNC: 105 MMOL/L (ref 99–110)
CLARITY: CLEAR
CO2: 24 MMOL/L (ref 21–32)
COLOR: YELLOW
CREAT SERPL-MCNC: 1 MG/DL (ref 0.6–1.1)
DIFFERENTIAL TYPE: ABNORMAL
EOSINOPHILS ABSOLUTE: 0.1 K/CU MM
EOSINOPHILS RELATIVE PERCENT: 2.6 % (ref 0–3)
GFR AFRICAN AMERICAN: >60 ML/MIN/1.73M2
GFR NON-AFRICAN AMERICAN: 55 ML/MIN/1.73M2
GLUCOSE BLD-MCNC: 84 MG/DL (ref 70–99)
GLUCOSE, URINE: NEGATIVE MG/DL
HCT VFR BLD CALC: 37.7 % (ref 37–47)
HEMOGLOBIN: 12 GM/DL (ref 12.5–16)
IGA: 425 MG/DL (ref 69–382)
IGG,SERUM: 1344 MG/DL (ref 723–1685)
IGM,SERUM: 37 MG/DL (ref 62–277)
KETONES, URINE: NEGATIVE MG/DL
LACTATE DEHYDROGENASE: 223 IU/L (ref 120–246)
LEUKOCYTE ESTERASE, URINE: NEGATIVE
LYMPHOCYTES ABSOLUTE: 1.4 K/CU MM
LYMPHOCYTES RELATIVE PERCENT: 29.7 % (ref 24–44)
MCH RBC QN AUTO: 29.4 PG (ref 27–31)
MCHC RBC AUTO-ENTMCNC: 31.8 % (ref 32–36)
MCV RBC AUTO: 92.4 FL (ref 78–100)
MONOCYTES ABSOLUTE: 0.5 K/CU MM
MONOCYTES RELATIVE PERCENT: 10.1 % (ref 0–4)
MUCUS: ABNORMAL HPF
NITRITE URINE, QUANTITATIVE: NEGATIVE
PDW BLD-RTO: 15.2 % (ref 11.7–14.9)
PH, URINE: 6 (ref 5–8)
PLATELET # BLD: 189 K/CU MM (ref 140–440)
PMV BLD AUTO: 9.5 FL (ref 7.5–11.1)
POTASSIUM SERPL-SCNC: 4.4 MMOL/L (ref 3.5–5.1)
PROTEIN UA: NEGATIVE MG/DL
RBC # BLD: 4.08 M/CU MM (ref 4.2–5.4)
RBC URINE: 5 /HPF (ref 0–6)
SEGMENTED NEUTROPHILS ABSOLUTE COUNT: 2.7 K/CU MM
SEGMENTED NEUTROPHILS RELATIVE PERCENT: 57.2 % (ref 36–66)
SODIUM BLD-SCNC: 139 MMOL/L (ref 135–145)
SPECIFIC GRAVITY UA: 1.02 (ref 1–1.03)
SQUAMOUS EPITHELIAL: <1 /HPF
TOTAL PROTEIN: 6.9 GM/DL (ref 6.4–8.2)
URIC ACID CRYSTALS: ABNORMAL /HPF
UROBILINOGEN, URINE: NORMAL MG/DL (ref 0.2–1)
WBC # BLD: 4.7 K/CU MM (ref 4–10.5)
WBC UA: 1 /HPF (ref 0–5)

## 2020-05-06 PROCEDURE — 81001 URINALYSIS AUTO W/SCOPE: CPT

## 2020-05-06 PROCEDURE — 83615 LACTATE (LD) (LDH) ENZYME: CPT

## 2020-05-06 PROCEDURE — 85025 COMPLETE CBC W/AUTO DIFF WBC: CPT

## 2020-05-06 PROCEDURE — 83883 ASSAY NEPHELOMETRY NOT SPEC: CPT

## 2020-05-06 PROCEDURE — 80053 COMPREHEN METABOLIC PANEL: CPT

## 2020-05-06 PROCEDURE — 84165 PROTEIN E-PHORESIS SERUM: CPT

## 2020-05-06 PROCEDURE — 86320 SERUM IMMUNOELECTROPHORESIS: CPT

## 2020-05-06 PROCEDURE — 36415 COLL VENOUS BLD VENIPUNCTURE: CPT

## 2020-05-06 PROCEDURE — 82784 ASSAY IGA/IGD/IGG/IGM EACH: CPT

## 2020-05-08 LAB
ALBUMIN ELP: 3.5 GM/DL (ref 3.2–5.6)
ALPHA-1-GLOBULIN: 0.2 GM/DL (ref 0.1–0.4)
ALPHA-2-GLOBULIN: 0.7 GM/DL (ref 0.4–1.2)
BETA GLOBULIN: 1.3 GM/DL (ref 0.5–1.3)
GAMMA GLOBULIN: 1.2 GM/DL (ref 0.5–1.6)
KAPPA QUANT FREE LIGHT CHAINS: 31.9 MG/L (ref 3.3–19.4)
KAPPA/LAMBDA FREE LIGHT CHAIN RATIO: 0.68 (ref 0.26–1.65)
LAMBDA FREE LIGHT CHAINS URINE/ VOL: 46.79 MG/L (ref 5.71–26.3)
SPEP INTERPRETATION: NORMAL
SPEP INTERPRETATION: NORMAL
TOTAL PROTEIN: 6.9 GM/DL (ref 6.4–8.2)

## 2020-05-13 ENCOUNTER — HOSPITAL ENCOUNTER (OUTPATIENT)
Dept: INFUSION THERAPY | Age: 70
Discharge: HOME | End: 2020-05-13
Payer: MEDICARE

## 2020-05-13 PROCEDURE — 99211 OFF/OP EST MAY X REQ PHY/QHP: CPT

## 2020-08-12 ENCOUNTER — HOSPITAL ENCOUNTER (OUTPATIENT)
Dept: INFUSION THERAPY | Age: 70
Discharge: HOME | End: 2020-08-12
Payer: MEDICARE

## 2020-08-12 DIAGNOSIS — C90.00 MULTIPLE MYELOMA NOT HAVING ACHIEVED REMISSION (HCC): ICD-10-CM

## 2020-08-12 LAB
ALBUMIN SERPL-MCNC: 4.2 GM/DL (ref 3.4–5)
ALP BLD-CCNC: 88 IU/L (ref 40–129)
ALT SERPL-CCNC: 15 U/L (ref 10–40)
ANION GAP SERPL CALCULATED.3IONS-SCNC: 12 MMOL/L (ref 4–16)
AST SERPL-CCNC: 17 IU/L (ref 15–37)
BASOPHILS ABSOLUTE: 0 K/CU MM
BASOPHILS RELATIVE PERCENT: 0.3 % (ref 0–1)
BILIRUB SERPL-MCNC: 0.6 MG/DL (ref 0–1)
BUN BLDV-MCNC: 27 MG/DL (ref 6–23)
CALCIUM SERPL-MCNC: 9.3 MG/DL (ref 8.3–10.6)
CHLORIDE BLD-SCNC: 105 MMOL/L (ref 99–110)
CO2: 24 MMOL/L (ref 21–32)
CREAT SERPL-MCNC: 1.2 MG/DL (ref 0.6–1.1)
DIFFERENTIAL TYPE: ABNORMAL
EOSINOPHILS ABSOLUTE: 0.1 K/CU MM
EOSINOPHILS RELATIVE PERCENT: 1.7 % (ref 0–3)
ERYTHROCYTE SEDIMENTATION RATE: 47 MM/HR (ref 0–30)
GFR AFRICAN AMERICAN: 54 ML/MIN/1.73M2
GFR NON-AFRICAN AMERICAN: 45 ML/MIN/1.73M2
GLUCOSE BLD-MCNC: 97 MG/DL (ref 70–99)
HCT VFR BLD CALC: 37.5 % (ref 37–47)
HEMOGLOBIN: 12.1 GM/DL (ref 12.5–16)
IGA: 439 MG/DL (ref 69–382)
IGG,SERUM: 1375 MG/DL (ref 723–1685)
IGM,SERUM: 43 MG/DL (ref 62–277)
LACTATE DEHYDROGENASE: 192 IU/L (ref 120–246)
LYMPHOCYTES ABSOLUTE: 1.6 K/CU MM
LYMPHOCYTES RELATIVE PERCENT: 22.3 % (ref 24–44)
MCH RBC QN AUTO: 29.2 PG (ref 27–31)
MCHC RBC AUTO-ENTMCNC: 32.3 % (ref 32–36)
MCV RBC AUTO: 90.4 FL (ref 78–100)
MONOCYTES ABSOLUTE: 0.7 K/CU MM
MONOCYTES RELATIVE PERCENT: 9.5 % (ref 0–4)
PDW BLD-RTO: 16.2 % (ref 11.7–14.9)
PLATELET # BLD: 204 K/CU MM (ref 140–440)
PMV BLD AUTO: 9.4 FL (ref 7.5–11.1)
POTASSIUM SERPL-SCNC: 4.5 MMOL/L (ref 3.5–5.1)
RBC # BLD: 4.15 M/CU MM (ref 4.2–5.4)
SEGMENTED NEUTROPHILS ABSOLUTE COUNT: 4.6 K/CU MM
SEGMENTED NEUTROPHILS RELATIVE PERCENT: 66.2 % (ref 36–66)
SODIUM BLD-SCNC: 141 MMOL/L (ref 135–145)
TOTAL PROTEIN: 7.1 GM/DL (ref 6.4–8.2)
WBC # BLD: 6.9 K/CU MM (ref 4–10.5)

## 2020-08-12 PROCEDURE — 83883 ASSAY NEPHELOMETRY NOT SPEC: CPT

## 2020-08-12 PROCEDURE — 36415 COLL VENOUS BLD VENIPUNCTURE: CPT

## 2020-08-12 PROCEDURE — 82784 ASSAY IGA/IGD/IGG/IGM EACH: CPT

## 2020-08-12 PROCEDURE — 80053 COMPREHEN METABOLIC PANEL: CPT

## 2020-08-12 PROCEDURE — 86320 SERUM IMMUNOELECTROPHORESIS: CPT

## 2020-08-12 PROCEDURE — 85652 RBC SED RATE AUTOMATED: CPT

## 2020-08-12 PROCEDURE — 84165 PROTEIN E-PHORESIS SERUM: CPT

## 2020-08-12 PROCEDURE — 85025 COMPLETE CBC W/AUTO DIFF WBC: CPT

## 2020-08-12 PROCEDURE — 83615 LACTATE (LD) (LDH) ENZYME: CPT

## 2020-08-14 LAB
ALBUMIN ELP: 3.4 GM/DL (ref 3.2–5.6)
ALPHA-1-GLOBULIN: 0.3 GM/DL (ref 0.1–0.4)
ALPHA-2-GLOBULIN: 1 GM/DL (ref 0.4–1.2)
BETA GLOBULIN: 1.3 GM/DL (ref 0.5–1.3)
GAMMA GLOBULIN: 1.1 GM/DL (ref 0.5–1.6)
KAPPA QUANT FREE LIGHT CHAINS: 33.94 MG/L (ref 3.3–19.4)
KAPPA/LAMBDA FREE LIGHT CHAIN RATIO: 0.55 (ref 0.26–1.65)
LAMBDA FREE LIGHT CHAINS URINE/ VOL: 61.81 MG/L (ref 5.71–26.3)
SPEP INTERPRETATION: NORMAL
SPEP INTERPRETATION: NORMAL
TOTAL PROTEIN: 7.1 GM/DL (ref 6.4–8.2)

## 2020-08-16 NOTE — PROGRESS NOTES
Patient Name: Armando Almodovar  Patient : 1950  Patient MRN: [id number]     Primary Oncologist: Amparo Youngblood MD  Referring Provider: Eliud Curry MD     Date of Service: 2020      Chief Complaint:   Chief Complaint   Patient presents with    Cancer    Follow-up     Patient Active Problem List:     Multiple myeloma not having achieved remission     HPI:   Ms. Sarahi Steen is a 82-year-old very pleasant patient with a medical history significant for stage II cervical cancer diagnosed in , status post laser surgery, gastroesophageal reflux disease, initially referred to me on 2014 for evaluation of right tibial intramedullary lesion. She stated that she has been having right leg pain since 2014, which has gradually been getting worse over time. She was seen by her primary care physician and had x-ray on 2014. It showed abnormal lucency within the tibial shaft at the junction of the mid and proximal third. MRI of the right lower extremity was done on 2014, and it showed marrow replacing 5.8 cm intramedullary lesion in the right mid tibial shaft with cortical breakthrough and adjacent periostitis. This corresponds to the lytic/lucent lesion on the recent x-ray. Differential considerations include lytic metastatic disease or multiple myeloma. She was subsequently referred to me for evaluation. She had upper sternum tumor for the last four years, which is about 8 by 10 cm in diameter. She otherwise does not have any other significant symptoms. 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 It is too small to measure the quantity. Her ESR was mildly elevated to 49.       Laboratory results done on September 3, 2014, showed normal CBC, mild elevation in serum creatinine (1.3), normal calcium (9.8), normal total protein (7.4), beta-2 microglobulin (4.2), triclonal gammopathy (2 IgA Lambda and 1 free Lambda light chain) on serum protein electrophoresis, two of which are large enough to measure on serum protein electrophoresis and which together total 300 mg/dl, ESR (60). Quantitative immunoglobulin IgA was elevated (760), IgM (41 mg/dl), and IgG (926). She also has 3.4 grams of protein in 23 hour urine and urine immunoelectrophoresis is consistent with monoclonal free Lambda light chains. She had biopsy of the right tibial lesions and sternal lesions on August 29, 2014 and final pathology was consistent with plasma cell myeloma. Flow cytometry was consistent with 23 percent of CD56 positive clonal plasma cell population, consistent with plasma cell neoplasms. FISH study showed \"Abnormal Myeloma FISH Profile. Monosomy for chromosome 13. Aneuploid population: Gain of chromosomes 15 and FGFR3/4p\". Cytogenetics wasn't performed by laboratory. Complete bone survey was done on September 3, 2014, and it showed multiple lucent lesions involving the visualized bony skeleton concerning for metastatic disease. This involves the skull, T10 vertebral body, left humerus, and right femur. Faint opacity overlying the left upper lobe could be related to atelectasis, mass, and/or infiltrate. Followup chest radiograph is recommended to assure stability/resolution. The lungs are under aerated, which limits evaluation. I believe faint opacity in the left upper lobe is most likely due to upper sternal lesion. Chemotherapy with Velcade, Revlimid and dexamethasone was started on September 16, 2014. Palliative radiation therapy to the right tibia lesion was also started by Dr. Leoncio Curiel on September 29, 2014 and she completed it on October 10, 2014.     Her serum free Lambda light chain on November 4, 2014, after the second cycle of chemotherapy was 1.25 (normal), after the first cycle of chemotherapy it was 4.32 (October 1, 2014), and decreased from pretreatment value of 141 on September 3, 2014. Her 24-hour urine protein has decreased to 90 mg per 24 hour on November 25, 2014, after third cycle of chemotherapy. It has decreased from 3.4 grams before chemotherapy. Ms. Ricardo Gaines completed her eight cycle of chemotherapy with Velcade, Revlimid and dexamethasone on February 27, 2015. Maintenance chemotherapy with Velcade was started on March 17, 2015. Since Ms. Ricardo Gaines developed significant neuropathy from maintenance chemotherapy with Velcade, we stopped Velcade on July 19, 2016. Since she has minimal residual disease and she is not considering stem-cell transplantation, I recommend to start melphalan and dexamethasone as a maintenance chemotherapy. It was started since August 11, 2016 and we stopped it on November 18, 2016, since melphalan is not available as of November 16, 2016. Since we could not get melphalan, we changed her chemotherapy to Revlimid and dexamethasone. She has been on Revlimid until January 18, 2017. I stopped the Revlimid since she could not tolerate Revlimid and dexamethasone very well. She also developed right lower extremity deep vein thrombosis secondary to the Revlimid. Anticoagulation therapy with Eliquis was started since January 18, 2017. We resumed her back on melphalan and prednisone since January 18, 2017. Eliquis was stopped on May 17, 2017, since she has been on Eliquis for about four months duration. Her D-dimer level done on May 17, 2017, was also within the normal range. Ms. Ricardo Gaines was found to have leukopenia, anemia, and thrombocytopenia on a blood test done on May 17, 2017, and I believe it is due to chemotherapy. Her chemotherapy was held since May 17, 2017. On August 19, 2020, she presented to me for followup. I have been following Ms. Ricardo Gaines for multiple myeloma and she is currently on close observation.   She was on maintenance chemotherapy until May 7, 2017, and we stopped it after that since she could not tolerate maintenance chemotherapy because of significant myelosuppression. She has been under close observation since then. She is feeling well and she does not have any signs or symptoms suggestive of recurrent multiple myeloma. Her both serum kappa and lambda are mildly elevated on recent blood test done on 8/12/20, 5/6/20, 11/6/19 and 02/05/20. I believe it looks more like polyclonal than monoclonal.     However, in view of her lambda light chain myeloma before, I recommend her to have repeat blood test again in three months. I still believe she is in complete clinical remission and no additional intervention needed at this moment for her multiple myeloma. However, I recommend to continue with zoledronic acid every twelve month intervals and her next treatment will be on September, 2020. For her hypertension, I recommend to continue with metoprolol 50 mg daily for now. I also recommend her to continue with vitamin B12 supplementation. I will continue to monitor her blood pressure closely. PAST MEDICAL HISTORY:  Past medical history is significant for the following. 1. Gastroesophageal reflux disease. 2. Stage II cervical cancer diagnosed in 1980, status post laser surgery. PAST SURGICAL HISTORY:  Past surgical history is significant for the following. 1. Cholecystectomy in 1998.  2. Tonsillectomy and appendectomy. 3. Tubal ligation in 1981. 4. Cheloid removal in October 2002. FAMILY HISTORY:  Family history is significant for small cell lung cancer in her mother. No other family history of cancer disease. SOCIAL HISTORY:  She denies smoking, alcohol drinking, and illicit drug abuse. She is  for 11 years and has two children. She is currently working at [employer]. ALLERGIES:  She claimed to have allergies to aspirin and iron. Review of Systems:   \"Per interval 08/12/2020    MPV 9.4 08/12/2020    BANDSPCT 5 03/08/2017    SEGSPCT 66.2 (H) 08/12/2020    EOSRELPCT 1.7 08/12/2020    BASOPCT 0.3 08/12/2020    LYMPHOPCT 22.3 (L) 08/12/2020    MONOPCT 9.5 (H) 08/12/2020    BANDABS 0.12 03/08/2017    SEGSABS 4.6 08/12/2020    EOSABS 0.1 08/12/2020    BASOSABS 0.0 08/12/2020    LYMPHSABS 1.6 08/12/2020    MONOSABS 0.7 08/12/2020    DIFFTYPE AUTOMATED DIFFERENTIAL 08/12/2020     Lab Results   Component Value Date    ESR 47 (H) 08/12/2020     Chemistry:  Lab Results   Component Value Date     08/12/2020    K 4.5 08/12/2020     08/12/2020    CO2 24 08/12/2020    BUN 27 (H) 08/12/2020    CREATININE 1.2 (H) 08/12/2020    GLUCOSE 97 08/12/2020    CALCIUM 9.3 08/12/2020    PROT 7.1 08/12/2020    PROT 7.1 08/12/2020    LABALBU 4.2 08/12/2020    BILITOT 0.6 08/12/2020    ALKPHOS 88 08/12/2020    AST 17 08/12/2020    ALT 15 08/12/2020    LABGLOM 45 (L) 08/12/2020    GFRAA 54 (L) 08/12/2020     Lab Results   Component Value Date     08/12/2020     No components found for: LD  Lab Results   Component Value Date    TSHHS 2.340 10/31/2018     Immunology:  Lab Results   Component Value Date    PROT 7.1 08/12/2020    PROT 7.1 08/12/2020    SPEP INTERPRETATION - Within normal limits. LP. 08/12/2020    SPEP  08/12/2020     INTERPRETATION - A definitive monoclonal protein is not identified.   LP.    ALBUMINELP 3.4 08/12/2020    LABALPH 0.3 08/12/2020    LABALPH 1.0 08/12/2020    LABBETA 1.3 08/12/2020    GAMGLOB 1.1 08/12/2020     Lab Results   Component Value Date    KAPPAUVOL 33.94 (H) 08/12/2020    LAMBDAUVOL 61.81 (H) 08/12/2020    KLFLCR 0.55 08/12/2020     Lab Results   Component Value Date    B2M 2.8 (H) 09/13/2016     Coagulation Panel:  Lab Results   Component Value Date    PROTIME 12.8 (H) 10/31/2018    INR 1.12 10/31/2018    APTT 29.7 10/31/2018    DDIMER <200 05/17/2017     Anemia Panel:  Lab Results   Component Value Date    BBSOISAE76 228.6 05/17/2017    FOLATE >20.0 (H) 05/17/2017     Tumor Markers:  No results found for: , CEA, , LABCA2, PSA  Observations:  PHQ-9 Total Score: 0 (8/19/2020  9:31 AM)        Assessment & Plan:   Right tibial intramedullary lesion and upper sternum lesion - with multiple lytic bone lesions - biopsy is consistent with multiple myeloma - s/p chemotherapy with Velcade, Revlimid and Dexamentasone - currently on maintenance Melphalan and prednisone. PLAN:  Overall Ms. Obdulia Galan  is feeling quite well and does not have any significant new symptoms on today's visit. I have been following Ms. Obdulia Galan for multiple myeloma and she is currently on close observation. She was on maintenance chemotherapy until May 7, 2017, and we stopped it after that since she could not tolerate maintenance chemotherapy because of significant myelosuppression. She has been under close observation since then. She is feeling well and she does not have any signs or symptoms suggestive of recurrent multiple myeloma. Her both serum kappa and lambda are mildly elevated on recent blood test done on 8/12/20, 5/6/20, 11/6/19 and 02/05/20. I believe it looks more like polyclonal than monoclonal.     However, in view of her lambda light chain myeloma before, I recommend her to have repeat blood test again in three months. I still believe she is in complete clinical remission and no additional intervention needed at this moment for her multiple myeloma. However, I recommend to continue with zoledronic acid every twelve month intervals and her next treatment will be on September, 2020. For her hypertension, I recommend to continue with metoprolol 50 mg daily for now. I also recommend her to continue with vitamin B12 supplementation. I have reviewed all these plans with Ms. Obdulia Galan and she is in agreement with the plans. I answered all her questions and concerns for today.   I recommend she contact me if she has any medical issues before I see her next time.    I encouraged her to stay active, do regular exercise, and eat healthy balanced diet. Ms. Adriana Kendall is coping very well with her diagnosis and she does not seem to have anxiety or depression today. I will continue to follow that on next visit. I asked her to follow up with primary care physician on regular basis. Thank you for allowing me to participate in the care of this very pleasant patient. Recent imaging and labs were reviewed and discussed with the patient.       Electronically signed by Lorrie Devi MD on 8/16/20 at 1:56 PM EDT

## 2020-08-19 ENCOUNTER — HOSPITAL ENCOUNTER (OUTPATIENT)
Dept: INFUSION THERAPY | Age: 70
Discharge: HOME | End: 2020-08-19
Payer: MEDICARE

## 2020-08-19 ENCOUNTER — OFFICE VISIT (OUTPATIENT)
Dept: ONCOLOGY | Age: 70
End: 2020-08-19
Payer: MEDICARE

## 2020-08-19 VITALS
OXYGEN SATURATION: 98 % | WEIGHT: 232 LBS | SYSTOLIC BLOOD PRESSURE: 164 MMHG | HEART RATE: 58 BPM | TEMPERATURE: 97.7 F | RESPIRATION RATE: 16 BRPM | HEIGHT: 67 IN | BODY MASS INDEX: 36.41 KG/M2 | DIASTOLIC BLOOD PRESSURE: 79 MMHG

## 2020-08-19 PROCEDURE — G8400 PT W/DXA NO RESULTS DOC: HCPCS | Performed by: INTERNAL MEDICINE

## 2020-08-19 PROCEDURE — 99211 OFF/OP EST MAY X REQ PHY/QHP: CPT

## 2020-08-19 PROCEDURE — 1123F ACP DISCUSS/DSCN MKR DOCD: CPT | Performed by: INTERNAL MEDICINE

## 2020-08-19 PROCEDURE — 4040F PNEUMOC VAC/ADMIN/RCVD: CPT | Performed by: INTERNAL MEDICINE

## 2020-08-19 PROCEDURE — 1090F PRES/ABSN URINE INCON ASSESS: CPT | Performed by: INTERNAL MEDICINE

## 2020-08-19 PROCEDURE — 3017F COLORECTAL CA SCREEN DOC REV: CPT | Performed by: INTERNAL MEDICINE

## 2020-08-19 PROCEDURE — 1036F TOBACCO NON-USER: CPT | Performed by: INTERNAL MEDICINE

## 2020-08-19 PROCEDURE — 99214 OFFICE O/P EST MOD 30 MIN: CPT | Performed by: INTERNAL MEDICINE

## 2020-08-19 PROCEDURE — G8427 DOCREV CUR MEDS BY ELIG CLIN: HCPCS | Performed by: INTERNAL MEDICINE

## 2020-08-19 PROCEDURE — G8417 CALC BMI ABV UP PARAM F/U: HCPCS | Performed by: INTERNAL MEDICINE

## 2020-08-19 RX ORDER — EPINEPHRINE 1 MG/ML
0.3 INJECTION, SOLUTION, CONCENTRATE INTRAVENOUS PRN
Status: CANCELLED | OUTPATIENT
Start: 2020-09-02

## 2020-08-19 RX ORDER — METOPROLOL SUCCINATE 50 MG/1
TABLET, EXTENDED RELEASE ORAL
COMMUNITY
Start: 2020-07-24 | End: 2020-10-26

## 2020-08-19 RX ORDER — HEPARIN SODIUM (PORCINE) LOCK FLUSH IV SOLN 100 UNIT/ML 100 UNIT/ML
500 SOLUTION INTRAVENOUS PRN
Status: CANCELLED | OUTPATIENT
Start: 2020-09-02

## 2020-08-19 RX ORDER — SODIUM CHLORIDE 9 MG/ML
20 INJECTION, SOLUTION INTRAVENOUS ONCE
Status: CANCELLED | OUTPATIENT
Start: 2020-09-02

## 2020-08-19 RX ORDER — SODIUM CHLORIDE 9 MG/ML
INJECTION, SOLUTION INTRAVENOUS CONTINUOUS
Status: CANCELLED | OUTPATIENT
Start: 2020-09-02

## 2020-08-19 RX ORDER — SODIUM CHLORIDE 0.9 % (FLUSH) 0.9 %
5 SYRINGE (ML) INJECTION PRN
Status: CANCELLED | OUTPATIENT
Start: 2020-09-02

## 2020-08-19 RX ORDER — SODIUM CHLORIDE 0.9 % (FLUSH) 0.9 %
10 SYRINGE (ML) INJECTION PRN
Status: CANCELLED | OUTPATIENT
Start: 2020-09-02

## 2020-08-19 RX ORDER — METHYLPREDNISOLONE SODIUM SUCCINATE 125 MG/2ML
125 INJECTION, POWDER, LYOPHILIZED, FOR SOLUTION INTRAMUSCULAR; INTRAVENOUS ONCE
Status: CANCELLED | OUTPATIENT
Start: 2020-09-02

## 2020-08-19 RX ORDER — LANOLIN ALCOHOL/MO/W.PET/CERES
CREAM (GRAM) TOPICAL
COMMUNITY
Start: 2020-07-23 | End: 2021-04-29 | Stop reason: SDUPTHER

## 2020-08-19 RX ORDER — DIPHENHYDRAMINE HYDROCHLORIDE 50 MG/ML
50 INJECTION INTRAMUSCULAR; INTRAVENOUS ONCE
Status: CANCELLED | OUTPATIENT
Start: 2020-09-02

## 2020-08-19 ASSESSMENT — PATIENT HEALTH QUESTIONNAIRE - PHQ9
SUM OF ALL RESPONSES TO PHQ QUESTIONS 1-9: 0
1. LITTLE INTEREST OR PLEASURE IN DOING THINGS: 0
SUM OF ALL RESPONSES TO PHQ9 QUESTIONS 1 & 2: 0
SUM OF ALL RESPONSES TO PHQ QUESTIONS 1-9: 0
2. FEELING DOWN, DEPRESSED OR HOPELESS: 0

## 2020-08-19 NOTE — PROGRESS NOTES
MA Rooming Questions  Patient: Koreen Cushing  MRN: [id number]    Date: 8/19/2020        1. Do you have any new issues?   no         2. Do you need any refills on medications?    no    3. Have you had any imaging done since your last visit?   no    4. Have you been hospitalized or seen in the emergency room since your last visit here?   no    5. Did the patient have a depression screening completed today?  Yes    PHQ-9 Total Score: 0 (8/19/2020  9:31 AM)       PHQ-9 Given to (if applicable): malik              PHQ-9 Score (if applicable):                     [] Positive     [x]  Negative              Does question #9 need addressed (if applicable)                     [] Yes    [x]  No               Ye Lance MA

## 2020-09-02 ENCOUNTER — HOSPITAL ENCOUNTER (OUTPATIENT)
Dept: INFUSION THERAPY | Age: 70
Discharge: HOME | End: 2020-09-02
Payer: MEDICARE

## 2020-09-02 VITALS
SYSTOLIC BLOOD PRESSURE: 169 MMHG | BODY MASS INDEX: 36.1 KG/M2 | HEIGHT: 67 IN | RESPIRATION RATE: 16 BRPM | DIASTOLIC BLOOD PRESSURE: 79 MMHG | HEART RATE: 62 BPM | OXYGEN SATURATION: 97 % | TEMPERATURE: 97.5 F | WEIGHT: 230 LBS

## 2020-09-02 DIAGNOSIS — C90.00 MULTIPLE MYELOMA NOT HAVING ACHIEVED REMISSION (HCC): Primary | ICD-10-CM

## 2020-09-02 PROCEDURE — 6360000002 HC RX W HCPCS: Performed by: INTERNAL MEDICINE

## 2020-09-02 PROCEDURE — 2580000003 HC RX 258: Performed by: INTERNAL MEDICINE

## 2020-09-02 PROCEDURE — 96374 THER/PROPH/DIAG INJ IV PUSH: CPT

## 2020-09-02 RX ORDER — SODIUM CHLORIDE 9 MG/ML
20 INJECTION, SOLUTION INTRAVENOUS ONCE
Status: DISCONTINUED | OUTPATIENT
Start: 2020-09-02 | End: 2020-09-03 | Stop reason: HOSPADM

## 2020-09-02 RX ADMIN — ZOLEDRONIC ACID 3.5 MG: 0.8 INJECTION, SOLUTION, CONCENTRATE INTRAVENOUS at 11:21

## 2020-09-02 RX ADMIN — SODIUM CHLORIDE 20 ML/HR: 9 INJECTION, SOLUTION INTRAVENOUS at 11:21

## 2020-09-02 NOTE — PROGRESS NOTES
Here for Zometa. Pt voiced no complaints. Reviewed labs drawn 8/12. WDL for treatment. Pt agreeable to POC. Treatment given as ordered. Call light in reach, needs met.

## 2020-09-18 ENCOUNTER — TELEPHONE (OUTPATIENT)
Dept: ONCOLOGY | Age: 70
End: 2020-09-18

## 2020-09-18 NOTE — TELEPHONE ENCOUNTER
Called patient back to let her know that she can take the Magnesium and Centrum w/Omega 3, had to leave a VM but advised her to call me back to let me know that she rec'd my msg and understands

## 2020-10-07 ENCOUNTER — HOSPITAL ENCOUNTER (OUTPATIENT)
Dept: WOMENS IMAGING | Age: 70
Discharge: HOME | End: 2020-10-07
Payer: MEDICARE

## 2020-10-07 PROCEDURE — 77067 SCR MAMMO BI INCL CAD: CPT

## 2020-10-26 RX ORDER — METOPROLOL SUCCINATE 50 MG/1
TABLET, EXTENDED RELEASE ORAL
Qty: 90 TABLET | Refills: 0 | Status: SHIPPED | OUTPATIENT
Start: 2020-10-26 | End: 2021-02-01

## 2020-11-11 ENCOUNTER — HOSPITAL ENCOUNTER (OUTPATIENT)
Dept: INFUSION THERAPY | Age: 70
Discharge: HOME | End: 2020-11-11
Payer: MEDICARE

## 2020-11-11 DIAGNOSIS — C90.00 MULTIPLE MYELOMA NOT HAVING ACHIEVED REMISSION (HCC): ICD-10-CM

## 2020-11-11 LAB
ALBUMIN SERPL-MCNC: 4.2 GM/DL (ref 3.4–5)
ALP BLD-CCNC: 73 IU/L (ref 40–128)
ALT SERPL-CCNC: 15 U/L (ref 10–40)
ANION GAP SERPL CALCULATED.3IONS-SCNC: 10 MMOL/L (ref 4–16)
AST SERPL-CCNC: 21 IU/L (ref 15–37)
BASOPHILS ABSOLUTE: 0 K/CU MM
BASOPHILS RELATIVE PERCENT: 0.2 % (ref 0–1)
BILIRUB SERPL-MCNC: 0.6 MG/DL (ref 0–1)
BUN BLDV-MCNC: 27 MG/DL (ref 6–23)
CALCIUM SERPL-MCNC: 9.3 MG/DL (ref 8.3–10.6)
CHLORIDE BLD-SCNC: 104 MMOL/L (ref 99–110)
CO2: 26 MMOL/L (ref 21–32)
CREAT SERPL-MCNC: 1.1 MG/DL (ref 0.6–1.1)
DIFFERENTIAL TYPE: ABNORMAL
EOSINOPHILS ABSOLUTE: 0.1 K/CU MM
EOSINOPHILS RELATIVE PERCENT: 2.6 % (ref 0–3)
GFR AFRICAN AMERICAN: 60 ML/MIN/1.73M2
GFR NON-AFRICAN AMERICAN: 49 ML/MIN/1.73M2
GLUCOSE BLD-MCNC: 88 MG/DL (ref 70–99)
HCT VFR BLD CALC: 38.1 % (ref 37–47)
HEMOGLOBIN: 12.3 GM/DL (ref 12.5–16)
IGA: 435 MG/DL (ref 69–382)
IGG,SERUM: 1387 MG/DL (ref 723–1685)
IGM,SERUM: 56 MG/DL (ref 62–277)
LACTATE DEHYDROGENASE: 216 IU/L (ref 120–246)
LYMPHOCYTES ABSOLUTE: 1.5 K/CU MM
LYMPHOCYTES RELATIVE PERCENT: 31.3 % (ref 24–44)
MCH RBC QN AUTO: 29.1 PG (ref 27–31)
MCHC RBC AUTO-ENTMCNC: 32.3 % (ref 32–36)
MCV RBC AUTO: 90.1 FL (ref 78–100)
MONOCYTES ABSOLUTE: 0.5 K/CU MM
MONOCYTES RELATIVE PERCENT: 10.2 % (ref 0–4)
PDW BLD-RTO: 15.5 % (ref 11.7–14.9)
PLATELET # BLD: 213 K/CU MM (ref 140–440)
PMV BLD AUTO: 9 FL (ref 7.5–11.1)
POTASSIUM SERPL-SCNC: 4.9 MMOL/L (ref 3.5–5.1)
RBC # BLD: 4.23 M/CU MM (ref 4.2–5.4)
SEGMENTED NEUTROPHILS ABSOLUTE COUNT: 2.7 K/CU MM
SEGMENTED NEUTROPHILS RELATIVE PERCENT: 55.7 % (ref 36–66)
SODIUM BLD-SCNC: 140 MMOL/L (ref 135–145)
TOTAL PROTEIN: 7 GM/DL (ref 6.4–8.2)
WBC # BLD: 4.9 K/CU MM (ref 4–10.5)

## 2020-11-11 PROCEDURE — 86320 SERUM IMMUNOELECTROPHORESIS: CPT

## 2020-11-11 PROCEDURE — 36415 COLL VENOUS BLD VENIPUNCTURE: CPT

## 2020-11-11 PROCEDURE — 83615 LACTATE (LD) (LDH) ENZYME: CPT

## 2020-11-11 PROCEDURE — 84165 PROTEIN E-PHORESIS SERUM: CPT

## 2020-11-11 PROCEDURE — 85025 COMPLETE CBC W/AUTO DIFF WBC: CPT

## 2020-11-11 PROCEDURE — 80053 COMPREHEN METABOLIC PANEL: CPT

## 2020-11-11 PROCEDURE — 83883 ASSAY NEPHELOMETRY NOT SPEC: CPT

## 2020-11-11 PROCEDURE — 82784 ASSAY IGA/IGD/IGG/IGM EACH: CPT

## 2020-11-13 LAB
ALBUMIN ELP: 3.5 GM/DL (ref 3.2–5.6)
ALPHA-1-GLOBULIN: 0.2 GM/DL (ref 0.1–0.4)
ALPHA-2-GLOBULIN: 0.7 GM/DL (ref 0.4–1.2)
BETA GLOBULIN: 1.4 GM/DL (ref 0.5–1.3)
GAMMA GLOBULIN: 1.1 GM/DL (ref 0.5–1.6)
KAPPA QUANT FREE LIGHT CHAINS: 31.81 MG/L (ref 3.3–19.4)
KAPPA/LAMBDA FREE LIGHT CHAIN RATIO: 0.47 (ref 0.26–1.65)
LAMBDA FREE LIGHT CHAINS URINE/ VOL: 67.21 MG/L (ref 5.71–26.3)
SPEP INTERPRETATION: ABNORMAL
SPEP INTERPRETATION: NORMAL
TOTAL PROTEIN: 7 GM/DL (ref 6.4–8.2)

## 2020-11-18 ENCOUNTER — HOSPITAL ENCOUNTER (OUTPATIENT)
Dept: INFUSION THERAPY | Age: 70
Discharge: HOME | End: 2020-11-18
Payer: MEDICARE

## 2020-11-18 ENCOUNTER — OFFICE VISIT (OUTPATIENT)
Dept: ONCOLOGY | Age: 70
End: 2020-11-18
Payer: MEDICARE

## 2020-11-18 VITALS
HEIGHT: 67 IN | TEMPERATURE: 97.5 F | DIASTOLIC BLOOD PRESSURE: 83 MMHG | SYSTOLIC BLOOD PRESSURE: 176 MMHG | OXYGEN SATURATION: 97 % | RESPIRATION RATE: 18 BRPM | HEART RATE: 56 BPM | WEIGHT: 228.8 LBS | BODY MASS INDEX: 35.91 KG/M2

## 2020-11-18 PROCEDURE — G8417 CALC BMI ABV UP PARAM F/U: HCPCS | Performed by: INTERNAL MEDICINE

## 2020-11-18 PROCEDURE — G8427 DOCREV CUR MEDS BY ELIG CLIN: HCPCS | Performed by: INTERNAL MEDICINE

## 2020-11-18 PROCEDURE — 99211 OFF/OP EST MAY X REQ PHY/QHP: CPT

## 2020-11-18 PROCEDURE — 99213 OFFICE O/P EST LOW 20 MIN: CPT | Performed by: INTERNAL MEDICINE

## 2020-11-18 PROCEDURE — 1090F PRES/ABSN URINE INCON ASSESS: CPT | Performed by: INTERNAL MEDICINE

## 2020-11-18 PROCEDURE — 1036F TOBACCO NON-USER: CPT | Performed by: INTERNAL MEDICINE

## 2020-11-18 PROCEDURE — G8400 PT W/DXA NO RESULTS DOC: HCPCS | Performed by: INTERNAL MEDICINE

## 2020-11-18 PROCEDURE — G8484 FLU IMMUNIZE NO ADMIN: HCPCS | Performed by: INTERNAL MEDICINE

## 2020-11-18 PROCEDURE — 4040F PNEUMOC VAC/ADMIN/RCVD: CPT | Performed by: INTERNAL MEDICINE

## 2020-11-18 PROCEDURE — 3017F COLORECTAL CA SCREEN DOC REV: CPT | Performed by: INTERNAL MEDICINE

## 2020-11-18 PROCEDURE — 1123F ACP DISCUSS/DSCN MKR DOCD: CPT | Performed by: INTERNAL MEDICINE

## 2020-11-18 NOTE — PROGRESS NOTES
Patient Name: Stuart Whyte  Patient : 1950  Patient MRN: [id number]     Primary Oncologist: Shabnam Gaitan MD  Referring Provider: Rodríguez Cam MD     Date of Service: 2020      Chief Complaint:   Chief Complaint   Patient presents with    Follow-up     Patient Active Problem List:     Multiple myeloma not having achieved remission     HPI:   Ms. Candido James is a 71-year-old very pleasant patient with a medical history significant for stage II cervical cancer diagnosed in , status post laser surgery, gastroesophageal reflux disease, initially referred to me on 2014 for evaluation of right tibial intramedullary lesion. She stated that she has been having right leg pain since 2014, which has gradually been getting worse over time. She was seen by her primary care physician and had x-ray on 2014. It showed abnormal lucency within the tibial shaft at the junction of the mid and proximal third. MRI of the right lower extremity was done on 2014, and it showed marrow replacing 5.8 cm intramedullary lesion in the right mid tibial shaft with cortical breakthrough and adjacent periostitis. This corresponds to the lytic/lucent lesion on the recent x-ray. Differential considerations include lytic metastatic disease or multiple myeloma. She was subsequently referred to me for evaluation. She had upper sternum tumor for the last four years, which is about 8 by 10 cm in diameter. She otherwise does not have any other significant symptoms. 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 It is too small to measure the quantity. Her ESR was mildly elevated to 49.       Laboratory results done on September 3, 2014, showed normal CBC, mild elevation in serum creatinine (1.3), normal calcium (9.8), normal total protein (7.4), beta-2 microglobulin (4.2), triclonal gammopathy (2 IgA Lambda and 1 free Lambda light chain) on serum protein electrophoresis, two of which are large enough to measure on serum protein electrophoresis and which together total 300 mg/dl, ESR (60). Quantitative immunoglobulin IgA was elevated (760), IgM (41 mg/dl), and IgG (926). She also has 3.4 grams of protein in 23 hour urine and urine immunoelectrophoresis is consistent with monoclonal free Lambda light chains. She had biopsy of the right tibial lesions and sternal lesions on August 29, 2014 and final pathology was consistent with plasma cell myeloma. Flow cytometry was consistent with 23 percent of CD56 positive clonal plasma cell population, consistent with plasma cell neoplasms. FISH study showed \"Abnormal Myeloma FISH Profile. Monosomy for chromosome 13. Aneuploid population: Gain of chromosomes 15 and FGFR3/4p\". Cytogenetics wasn't performed by laboratory. Complete bone survey was done on September 3, 2014, and it showed multiple lucent lesions involving the visualized bony skeleton concerning for metastatic disease. This involves the skull, T10 vertebral body, left humerus, and right femur. Faint opacity overlying the left upper lobe could be related to atelectasis, mass, and/or infiltrate. Followup chest radiograph is recommended to assure stability/resolution. The lungs are under aerated, which limits evaluation. I believe faint opacity in the left upper lobe is most likely due to upper sternal lesion. Chemotherapy with Velcade, Revlimid and dexamethasone was started on September 16, 2014. Palliative radiation therapy to the right tibia lesion was also started by Dr. Ede Fuentes on September 29, 2014 and she completed it on October 10, 2014.     Her serum free Lambda light chain on November 4, 2014, after the second cycle of chemotherapy was 1.25 (normal), after the first cycle of chemotherapy it was 4.32 (October 1, 2014), and decreased from pretreatment value of 141 on September 3, 2014. Her 24-hour urine protein has decreased to 90 mg per 24 hour on November 25, 2014, after third cycle of chemotherapy. It has decreased from 3.4 grams before chemotherapy. Ms. Candido James completed her eight cycle of chemotherapy with Velcade, Revlimid and dexamethasone on February 27, 2015. Maintenance chemotherapy with Velcade was started on March 17, 2015. Since Ms. Candido James developed significant neuropathy from maintenance chemotherapy with Velcade, we stopped Velcade on July 19, 2016. Since she has minimal residual disease and she is not considering stem-cell transplantation, I recommend to start melphalan and dexamethasone as a maintenance chemotherapy. It was started since August 11, 2016 and we stopped it on November 18, 2016, since melphalan is not available as of November 16, 2016. Since we could not get melphalan, we changed her chemotherapy to Revlimid and dexamethasone. She has been on Revlimid until January 18, 2017. I stopped the Revlimid since she could not tolerate Revlimid and dexamethasone very well. She also developed right lower extremity deep vein thrombosis secondary to the Revlimid. Anticoagulation therapy with Eliquis was started since January 18, 2017. We resumed her back on melphalan and prednisone since January 18, 2017. Eliquis was stopped on May 17, 2017, since she has been on Eliquis for about four months duration. Her D-dimer level done on May 17, 2017, was also within the normal range. Ms. Candido James was found to have leukopenia, anemia, and thrombocytopenia on a blood test done on May 17, 2017, and I believe it is due to chemotherapy. Her chemotherapy was held since May 17, 2017. On November 18, 2020, she presented to me for followup. I have been following Ms. Candido James for multiple myeloma and she is currently on close observation.   She was on maintenance chemotherapy until May 7, 2017, and we stopped it after that since she could not tolerate maintenance chemotherapy because of significant myelosuppression. She has been under close observation since then. She is feeling well and she does not have any signs or symptoms suggestive of recurrent multiple myeloma. Her both serum kappa and lambda are mildly elevated on recent blood test done on 11/11/20,  8/12/20, 5/6/20, 11/6/19 and 02/05/20. I believe it looks more like polyclonal than monoclonal. It has been quite stable lately. However, in view of her lambda light chain myeloma before, I recommend her to have repeat blood test again in four months. I still believe she is in complete clinical remission and no additional intervention needed at this moment for her multiple myeloma. However, I recommend to continue with zoledronic acid every twelve month intervals and her next treatment will be on September, 2021. For her hypertension, I recommend to continue with metoprolol 50 mg daily for now. I also recommend her to continue with vitamin B12 supplementation. I will continue to monitor her blood pressure closely. PAST MEDICAL HISTORY:  Past medical history is significant for the following. 1. Gastroesophageal reflux disease. 2. Stage II cervical cancer diagnosed in 1980, status post laser surgery. PAST SURGICAL HISTORY:  Past surgical history is significant for the following. 1. Cholecystectomy in 1998.  2. Tonsillectomy and appendectomy. 3. Tubal ligation in 1981. 4. Cheloid removal in October 2002. FAMILY HISTORY:  Family history is significant for small cell lung cancer in her mother. No other family history of cancer disease. SOCIAL HISTORY:  She denies smoking, alcohol drinking, and illicit drug abuse. She is  for 11 years and has two children. She is currently working at [employer].     ALLERGIES:  She claimed to have allergies to aspirin and iron.    Review of Systems: \"Per interval history; otherwise 10 point ROS is negative. \"   Her energy level is pretty good, appetite, and sleep are stable. No fever, chills, night sweats, cough, shortness of breath, chest pain, hemoptysis, or palpitations. Her bowel and bladder functions are normal. She doesn't have nausea, vomiting, abdominal pain, diarrhea, constipation, dysuria, loss of appetite, or weight loss. She denies neuropathy and she does not have bleeding or clotting issues. She denies any pain in her body. No anxiety or depression. The rest of the systems are unremarkable. Vital Signs:  BP (!) 176/83 (Site: Left Upper Arm, Position: Sitting, Cuff Size: Large Adult)   Pulse 56   Temp 97.5 °F (36.4 °C) (Infrared)   Resp 18   Ht 5' 6.5\" (1.689 m)   Wt 228 lb 12.8 oz (103.8 kg)   SpO2 97%   BMI 36.38 kg/m²     Physical Exam:  CONSTITUTIONAL: awake, alert, cooperative, no apparent distress   EYES: pupils equal, round and reactive to light, sclera clear and conjunctiva normal  ENT: Normocephalic, without obvious abnormality, atraumatic  NECK: supple, symmetrical, no jugular venous distension and no carotid bruits   HEMATOLOGIC/LYMPHATIC: no cervical, supraclavicular or axillary lymphadenopathy   LUNGS: VBS, no wheezes, no crackles, no rhonchi, no increased work of breathing and clear to auscultation   CARDIOVASCULAR: regular rate and rhythm, normal S1 and S2, no murmur noted  ABDOMEN: normal bowel sounds x 4, non-tender, no masses palpated, no hepatosplenomegaly,  soft, non-distended,   MUSCULOSKELETAL: full range of motion noted, tone is normal  NEUROLOGIC: awake, alert, oriented to name, place and time. Motor skills grossly intact. SKIN: Normal skin color, texture, turgor and no jaundice.  appears intact   EXTREMITIES: no LE edema, no leg swelling, no cyanosis, no clubbing     Labs:  Hematology:  Lab Results   Component Value Date    WBC 4.9 11/11/2020    RBC 4.23 11/11/2020    HGB 12.3 (L) [id number] 228.6 05/17/2017    FOLATE >20.0 (H) 05/17/2017     Tumor Markers:  No results found for: , CEA, , LABCA2, PSA  Observations:  No data recorded        Assessment & Plan:   Right tibial intramedullary lesion and upper sternum lesion - with multiple lytic bone lesions - biopsy is consistent with multiple myeloma - s/p chemotherapy with Velcade, Revlimid and Dexamentasone - currently on maintenance Melphalan and prednisone. PLAN:  Overall Ms. Hui Yu  is feeling quite well and does not have any significant new symptoms on today's visit. I have been following Ms. Hui Yu for multiple myeloma and she is currently on close observation. She was on maintenance chemotherapy until May 7, 2017, and we stopped it after that since she could not tolerate maintenance chemotherapy because of significant myelosuppression. She has been under close observation since then. She is feeling well and she does not have any signs or symptoms suggestive of recurrent multiple myeloma. Her both serum kappa and lambda are mildly elevated on recent blood test done on 11/11/20,  8/12/20, 5/6/20, 11/6/19 and 02/05/20. I believe it looks more like polyclonal than monoclonal. It has been quite stable lately. However, in view of her lambda light chain myeloma before, I recommend her to have repeat blood test again in four months. I still believe she is in complete clinical remission and no additional intervention needed at this moment for her multiple myeloma. However, I recommend to continue with zoledronic acid every twelve month intervals and her next treatment will be on September, 2021. For her hypertension, I recommend to continue with metoprolol 50 mg daily for now. I also recommend her to continue with vitamin B12 supplementation. I answered all her questions and concerns for today. I asked her to follow up with primary care physician on regular basis.     Recent imaging and labs were reviewed and discussed with the patient.

## 2020-11-18 NOTE — PROGRESS NOTES
MA Rooming Questions  Patient: Gita Wilson  MRN: [id number]    Date: 11/18/2020        1. Do you have any new issues?   no         2. Do you need any refills on medications?    no    3. Have you had any imaging done since your last visit?   no    4. Have you been hospitalized or seen in the emergency room since your last visit here?   no    5. Did the patient have a depression screening completed today?  No    No data recorded     PHQ-9 Given to (if applicable):               PHQ-9 Score (if applicable):                     [] Positive     []  Negative              Does question #9 need addressed (if applicable)                     [] Yes    []  No               Sherryle Merl, MA

## 2021-02-01 RX ORDER — METOPROLOL SUCCINATE 50 MG/1
TABLET, EXTENDED RELEASE ORAL
Qty: 90 TABLET | Refills: 0 | Status: SHIPPED | OUTPATIENT
Start: 2021-02-01 | End: 2021-04-29

## 2021-03-10 ENCOUNTER — HOSPITAL ENCOUNTER (OUTPATIENT)
Dept: INFUSION THERAPY | Age: 71
Discharge: HOME | End: 2021-03-10
Payer: MEDICARE

## 2021-03-10 DIAGNOSIS — C90.00 MULTIPLE MYELOMA NOT HAVING ACHIEVED REMISSION (HCC): ICD-10-CM

## 2021-03-10 LAB
ALBUMIN SERPL-MCNC: 3.7 GM/DL (ref 3.4–5)
ALP BLD-CCNC: 74 IU/L (ref 40–129)
ALT SERPL-CCNC: 14 U/L (ref 10–40)
ANION GAP SERPL CALCULATED.3IONS-SCNC: 7 MMOL/L (ref 4–16)
AST SERPL-CCNC: 20 IU/L (ref 15–37)
BASOPHILS ABSOLUTE: 0 K/CU MM
BASOPHILS RELATIVE PERCENT: 0.2 % (ref 0–1)
BILIRUB SERPL-MCNC: 0.6 MG/DL (ref 0–1)
BUN BLDV-MCNC: 29 MG/DL (ref 6–23)
CALCIUM SERPL-MCNC: 9.1 MG/DL (ref 8.3–10.6)
CHLORIDE BLD-SCNC: 103 MMOL/L (ref 99–110)
CO2: 28 MMOL/L (ref 21–32)
CREAT SERPL-MCNC: 1.1 MG/DL (ref 0.6–1.1)
DIFFERENTIAL TYPE: ABNORMAL
EOSINOPHILS ABSOLUTE: 0.2 K/CU MM
EOSINOPHILS RELATIVE PERCENT: 3.6 % (ref 0–3)
ERYTHROCYTE SEDIMENTATION RATE: 46 MM/HR (ref 0–30)
GFR AFRICAN AMERICAN: 60 ML/MIN/1.73M2
GFR NON-AFRICAN AMERICAN: 49 ML/MIN/1.73M2
GLUCOSE BLD-MCNC: 87 MG/DL (ref 70–99)
HCT VFR BLD CALC: 38 % (ref 37–47)
HEMOGLOBIN: 12.1 GM/DL (ref 12.5–16)
IGA: 431 MG/DL (ref 69–382)
IGG,SERUM: 1406 MG/DL (ref 723–1685)
IGM,SERUM: 66 MG/DL (ref 62–277)
LACTATE DEHYDROGENASE: 213 IU/L (ref 120–246)
LYMPHOCYTES ABSOLUTE: 1.5 K/CU MM
LYMPHOCYTES RELATIVE PERCENT: 29.6 % (ref 24–44)
MCH RBC QN AUTO: 28.5 PG (ref 27–31)
MCHC RBC AUTO-ENTMCNC: 31.8 % (ref 32–36)
MCV RBC AUTO: 89.6 FL (ref 78–100)
MONOCYTES ABSOLUTE: 0.5 K/CU MM
MONOCYTES RELATIVE PERCENT: 10.5 % (ref 0–4)
PDW BLD-RTO: 17.3 % (ref 11.7–14.9)
PLATELET # BLD: 212 K/CU MM (ref 140–440)
PMV BLD AUTO: 9.4 FL (ref 7.5–11.1)
POTASSIUM SERPL-SCNC: 4.8 MMOL/L (ref 3.5–5.1)
RBC # BLD: 4.24 M/CU MM (ref 4.2–5.4)
SEGMENTED NEUTROPHILS ABSOLUTE COUNT: 2.8 K/CU MM
SEGMENTED NEUTROPHILS RELATIVE PERCENT: 56.1 % (ref 36–66)
SODIUM BLD-SCNC: 138 MMOL/L (ref 135–145)
TOTAL PROTEIN: 6.8 GM/DL (ref 6.4–8.2)
WBC # BLD: 5 K/CU MM (ref 4–10.5)

## 2021-03-10 PROCEDURE — 84165 PROTEIN E-PHORESIS SERUM: CPT

## 2021-03-10 PROCEDURE — 85652 RBC SED RATE AUTOMATED: CPT

## 2021-03-10 PROCEDURE — 82784 ASSAY IGA/IGD/IGG/IGM EACH: CPT

## 2021-03-10 PROCEDURE — 80053 COMPREHEN METABOLIC PANEL: CPT

## 2021-03-10 PROCEDURE — 83615 LACTATE (LD) (LDH) ENZYME: CPT

## 2021-03-10 PROCEDURE — 82232 ASSAY OF BETA-2 PROTEIN: CPT

## 2021-03-10 PROCEDURE — 85025 COMPLETE CBC W/AUTO DIFF WBC: CPT

## 2021-03-10 PROCEDURE — 36415 COLL VENOUS BLD VENIPUNCTURE: CPT

## 2021-03-10 PROCEDURE — 86320 SERUM IMMUNOELECTROPHORESIS: CPT

## 2021-03-10 PROCEDURE — 83883 ASSAY NEPHELOMETRY NOT SPEC: CPT

## 2021-03-11 LAB
BETA-2 MICROGLOBULIN: 4 MG/L (ref 1.1–2.4)
KAPPA QUANT FREE LIGHT CHAINS: 36.9 MG/L (ref 3.3–19.4)
KAPPA/LAMBDA FREE LIGHT CHAIN RATIO: 0.38 (ref 0.26–1.65)
LAMBDA FREE LIGHT CHAINS QNT: 97.06 MG/L (ref 5.71–26.3)

## 2021-03-12 LAB
ALBUMIN ELP: 3.4 GM/DL (ref 3.2–5.6)
ALPHA-1-GLOBULIN: 0.2 GM/DL (ref 0.1–0.4)
ALPHA-2-GLOBULIN: 0.7 GM/DL (ref 0.4–1.2)
BETA GLOBULIN: 1.2 GM/DL (ref 0.5–1.3)
GAMMA GLOBULIN: 1.3 GM/DL (ref 0.5–1.6)
SPEP INTERPRETATION: NORMAL
SPEP INTERPRETATION: NORMAL
TOTAL PROTEIN: 6.8 GM/DL (ref 6.4–8.2)

## 2021-03-15 NOTE — PROGRESS NOTES
Patient Name: Musa Duffy  Patient : 1950  Patient MRN: [id number]     Primary Oncologist: Marlin Tamayo MD  Referring Provider: Devin Fink MD     Date of Service: 3/17/2021      Chief Complaint:   Chief Complaint   Patient presents with    Follow-up     Patient Active Problem List:     Multiple myeloma not having achieved remission     HPI:   Ms. Suzan Kim is a 72-year-old very pleasant patient with a medical history significant for stage II cervical cancer diagnosed in , status post laser surgery, gastroesophageal reflux disease, initially referred to me on 2014 for evaluation of right tibial intramedullary lesion. She stated that she has been having right leg pain since 2014, which has gradually been getting worse over time. She was seen by her primary care physician and had x-ray on 2014. It showed abnormal lucency within the tibial shaft at the junction of the mid and proximal third. MRI of the right lower extremity was done on 2014, and it showed marrow replacing 5.8 cm intramedullary lesion in the right mid tibial shaft with cortical breakthrough and adjacent periostitis. This corresponds to the lytic/lucent lesion on the recent x-ray. Differential considerations include lytic metastatic disease or multiple myeloma. She was subsequently referred to me for evaluation. She had upper sternum tumor for the last four years, which is about 8 by 10 cm in diameter. She otherwise does not have any other significant symptoms. 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 It is too small to measure the quantity. Her ESR was mildly elevated to 49.       Laboratory results done on September 3, 2014, showed normal CBC, mild elevation in serum creatinine (1.3), normal calcium (9.8), normal total protein (7.4), beta-2 microglobulin (4.2), triclonal gammopathy (2 IgA Lambda and 1 free Lambda light chain) on serum protein electrophoresis, two of which are large enough to measure on serum protein electrophoresis and which together total 300 mg/dl, ESR (60). Quantitative immunoglobulin IgA was elevated (760), IgM (41 mg/dl), and IgG (926). She also has 3.4 grams of protein in 23 hour urine and urine immunoelectrophoresis is consistent with monoclonal free Lambda light chains. She had biopsy of the right tibial lesions and sternal lesions on August 29, 2014 and final pathology was consistent with plasma cell myeloma. Flow cytometry was consistent with 23 percent of CD56 positive clonal plasma cell population, consistent with plasma cell neoplasms. FISH study showed \"Abnormal Myeloma FISH Profile. Monosomy for chromosome 13. Aneuploid population: Gain of chromosomes 15 and FGFR3/4p\". Cytogenetics wasn't performed by laboratory. Complete bone survey was done on September 3, 2014, and it showed multiple lucent lesions involving the visualized bony skeleton concerning for metastatic disease. This involves the skull, T10 vertebral body, left humerus, and right femur. Faint opacity overlying the left upper lobe could be related to atelectasis, mass, and/or infiltrate. Followup chest radiograph is recommended to assure stability/resolution. The lungs are under aerated, which limits evaluation. I believe faint opacity in the left upper lobe is most likely due to upper sternal lesion. Chemotherapy with Velcade, Revlimid and dexamethasone was started on September 16, 2014. Palliative radiation therapy to the right tibia lesion was also started by Dr. Alexandru Shay on September 29, 2014 and she completed it on October 10, 2014.     Her serum free Lambda light chain on November 4, 2014, after the second cycle of chemotherapy was 1.25 (normal), after the first cycle of chemotherapy it was 4.32 (October 1, 2014), and decreased from pretreatment value of 141 on September 3, 2014. Her 24-hour urine protein has decreased to 90 mg per 24 hour on November 25, 2014, after third cycle of chemotherapy. It has decreased from 3.4 grams before chemotherapy. Ms. Major Lam completed her eight cycle of chemotherapy with Velcade, Revlimid and dexamethasone on February 27, 2015. Maintenance chemotherapy with Velcade was started on March 17, 2015. Since Ms. Major Lam developed significant neuropathy from maintenance chemotherapy with Velcade, we stopped Velcade on July 19, 2016. Since she has minimal residual disease and she is not considering stem-cell transplantation, I recommend to start melphalan and dexamethasone as a maintenance chemotherapy. It was started since August 11, 2016 and we stopped it on November 18, 2016, since melphalan is not available as of November 16, 2016. Since we could not get melphalan, we changed her chemotherapy to Revlimid and dexamethasone. She has been on Revlimid until January 18, 2017. I stopped the Revlimid since she could not tolerate Revlimid and dexamethasone very well. She also developed right lower extremity deep vein thrombosis secondary to the Revlimid. Anticoagulation therapy with Eliquis was started since January 18, 2017. We resumed her back on melphalan and prednisone since January 18, 2017. Eliquis was stopped on May 17, 2017, since she has been on Eliquis for about four months duration. Her D-dimer level done on May 17, 2017, was also within the normal range. Ms. Major Lam was found to have leukopenia, anemia, and thrombocytopenia on a blood test done on May 17, 2017, and I believe it is due to chemotherapy. Her chemotherapy was held since May 17, 2017. On March 17, 2021, she presented to me for followup. I have been following Ms. Major Lam for multiple myeloma and she is currently on close observation.   She was on maintenance chemotherapy until May 7, 2017, and we stopped it after that since she could not tolerate maintenance chemotherapy because of significant myelosuppression. She has been under close observation since then. She is feeling well and she does not have any signs or symptoms suggestive of recurrent multiple myeloma. Her both serum kappa and lambda are mildly elevated on recent blood test done on 3/10/21, 11/11/20,  8/12/20, 5/6/20, 11/6/19 and 02/05/20. I believe it looks more like polyclonal than monoclonal. Her lambda light chain has gradually increased in size. In view of her lambda light chain myeloma before, I recommend her to have repeat blood test again in 3 months. I still believe she is in complete clinical remission and no additional intervention needed at this moment for her multiple myeloma. However, I recommend to continue with zoledronic acid every twelve month intervals and her next treatment will be on September, 2021. For her hypertension, I recommend to continue with metoprolol 50 mg daily for now. I also recommend her to continue with vitamin B12 supplementation. Health maintenance -I recommend her to have age-appropriate cancer screening, exercise, low-fat and low-sodium diet. COVID19 vaccine -she has not received the vaccine yet. I strongly recommend her to have COVID-19 vaccine as soon as possible. I also recommend her to have shingles vaccine about 5 weeks after her second dose of COVID19 vaccine. I will continue to monitor her blood pressure closely. PAST MEDICAL HISTORY:  Past medical history is significant for the following. 1. Gastroesophageal reflux disease. 2. Stage II cervical cancer diagnosed in 1980, status post laser surgery. PAST SURGICAL HISTORY:  Past surgical history is significant for the following. 1. Cholecystectomy in 1998.  2. Tonsillectomy and appendectomy. 3. Tubal ligation in 1981.   4. Cheloid removal in October 2002.    FAMILY HISTORY:  Family history is significant for small cell lung cancer in her mother. No other family history of cancer disease. SOCIAL HISTORY:  She denies smoking, alcohol drinking, and illicit drug abuse. She is  for 11 years and has two children. She is currently working at [employer]. ALLERGIES:  She claimed to have allergies to aspirin and iron. Review of Systems: \"Per interval history; otherwise 10 point ROS is negative. \"   Her energy level is good, appetite, and sleep are fine. She denies fever, chills, night sweats, cough, shortness of breath, chest pain, hemoptysis, or palpitations. Her bowel and bladder functions are normal. She denies nausea, vomiting, abdominal pain, diarrhea, constipation, dysuria, loss of appetite, or weight loss. She doesn't have neuropathy and she denies bleeding or clotting issues. She doesn't have any pain in her body. Denies anxiety or depression. The rest of the systems are unremarkable.     Vital Signs:  BP (!) 150/89 (Site: Right Lower Arm, Position: Sitting, Cuff Size: Large Adult)   Pulse 104   Temp 98.2 °F (36.8 °C) (Temporal)   Ht 5' 6\" (1.676 m)   Wt 233 lb (105.7 kg)   SpO2 99%   BMI 37.61 kg/m²     Physical Exam:  CONSTITUTIONAL: awake, alert, cooperative, no apparent distress   EYES: pupils equal, round and reactive to light, sclera clear and conjunctiva normal  ENT: Normocephalic, without obvious abnormality, atraumatic  NECK: supple, symmetrical, no jugular venous distension and no carotid bruits   HEMATOLOGIC/LYMPHATIC: no cervical, supraclavicular or axillary lymphadenopathy   LUNGS: VBS, no wheezes, no rhonchi, no increased work of breathing, no crackles, clear to auscultation   CARDIOVASCULAR: regular rate and rhythm, normal S1 and S2, no murmur noted  ABDOMEN: normal bowel sounds x 4, non-tender, no masses palpated, no hepatosplenomegaly,  soft, non-distended,   MUSCULOSKELETAL: full range of motion noted, tone is normal  NEUROLOGIC: awake, alert, oriented to name, place and time. Motor skills grossly intact. SKIN: Normal skin color, texture, turgor and no jaundice. appears intact   EXTREMITIES: no leg swelling, no cyanosis, no LE edema, no clubbing     Labs:  Hematology:  Lab Results   Component Value Date    WBC 5.0 03/10/2021    RBC 4.24 03/10/2021    HGB 12.1 (L) 03/10/2021    HCT 38.0 03/10/2021    MCV 89.6 03/10/2021    MCH 28.5 03/10/2021    MCHC 31.8 (L) 03/10/2021    RDW 17.3 (H) 03/10/2021     03/10/2021    MPV 9.4 03/10/2021    BANDSPCT 5 03/08/2017    SEGSPCT 56.1 03/10/2021    EOSRELPCT 3.6 (H) 03/10/2021    BASOPCT 0.2 03/10/2021    LYMPHOPCT 29.6 03/10/2021    MONOPCT 10.5 (H) 03/10/2021    BANDABS 0.12 03/08/2017    SEGSABS 2.8 03/10/2021    EOSABS 0.2 03/10/2021    BASOSABS 0.0 03/10/2021    LYMPHSABS 1.5 03/10/2021    MONOSABS 0.5 03/10/2021    DIFFTYPE AUTOMATED DIFFERENTIAL 03/10/2021     Lab Results   Component Value Date    ESR 46 (H) 03/10/2021     Chemistry:  Lab Results   Component Value Date     03/10/2021    K 4.8 03/10/2021     03/10/2021    CO2 28 03/10/2021    BUN 29 (H) 03/10/2021    CREATININE 1.1 03/10/2021    GLUCOSE 87 03/10/2021    CALCIUM 9.1 03/10/2021    PROT 6.8 03/10/2021    PROT 6.8 03/10/2021    LABALBU 3.7 03/10/2021    BILITOT 0.6 03/10/2021    ALKPHOS 74 03/10/2021    AST 20 03/10/2021    ALT 14 03/10/2021    LABGLOM 49 (L) 03/10/2021    GFRAA 60 (L) 03/10/2021     Lab Results   Component Value Date     03/10/2021     No components found for: LD  Lab Results   Component Value Date    TSHHS 2.340 10/31/2018     Immunology:  Lab Results   Component Value Date    PROT 6.8 03/10/2021    PROT 6.8 03/10/2021    SPEP INTERPRETATION - Within normal limits. RS 03/10/2021    SPEP INTERPRETATION - Within normal limits.   RS 03/10/2021    ALBUMINELP 3.4 03/10/2021    LABALPH 0.2 03/10/2021    LABALPH 0.7 03/10/2021    LABBETA 1.2 03/10/2021    GAMGLOB 1.3 multiple myeloma. However, I recommend to continue with zoledronic acid every twelve month intervals and her next treatment will be on September, 2021. For her hypertension, I recommend to continue with metoprolol 50 mg daily for now. I also recommend her to continue with vitamin B12 supplementation. Health maintenance -I recommend her to have age-appropriate cancer screening, exercise, low-fat and low-sodium diet. COVID19 vaccine -she has not received the vaccine yet. I strongly recommend her to have COVID-19 vaccine as soon as possible. I also recommend her to have shingles vaccine about 5 weeks after her second dose of COVID19 vaccine. I answered all her questions and concerns for today. I asked her to follow up with primary care physician on regular basis. Recent imaging and labs were reviewed and discussed with the patient.

## 2021-03-17 ENCOUNTER — HOSPITAL ENCOUNTER (OUTPATIENT)
Dept: INFUSION THERAPY | Age: 71
Discharge: HOME | End: 2021-03-17
Payer: MEDICARE

## 2021-03-17 ENCOUNTER — OFFICE VISIT (OUTPATIENT)
Dept: ONCOLOGY | Age: 71
End: 2021-03-17
Payer: MEDICARE

## 2021-03-17 VITALS
HEIGHT: 66 IN | BODY MASS INDEX: 37.45 KG/M2 | HEART RATE: 104 BPM | OXYGEN SATURATION: 99 % | SYSTOLIC BLOOD PRESSURE: 150 MMHG | DIASTOLIC BLOOD PRESSURE: 89 MMHG | WEIGHT: 233 LBS | TEMPERATURE: 98.2 F

## 2021-03-17 DIAGNOSIS — C90.00 MULTIPLE MYELOMA NOT HAVING ACHIEVED REMISSION (HCC): Primary | ICD-10-CM

## 2021-03-17 PROCEDURE — G8427 DOCREV CUR MEDS BY ELIG CLIN: HCPCS | Performed by: INTERNAL MEDICINE

## 2021-03-17 PROCEDURE — 1090F PRES/ABSN URINE INCON ASSESS: CPT | Performed by: INTERNAL MEDICINE

## 2021-03-17 PROCEDURE — 99211 OFF/OP EST MAY X REQ PHY/QHP: CPT

## 2021-03-17 PROCEDURE — G8417 CALC BMI ABV UP PARAM F/U: HCPCS | Performed by: INTERNAL MEDICINE

## 2021-03-17 PROCEDURE — 1123F ACP DISCUSS/DSCN MKR DOCD: CPT | Performed by: INTERNAL MEDICINE

## 2021-03-17 PROCEDURE — 3017F COLORECTAL CA SCREEN DOC REV: CPT | Performed by: INTERNAL MEDICINE

## 2021-03-17 PROCEDURE — 99214 OFFICE O/P EST MOD 30 MIN: CPT | Performed by: INTERNAL MEDICINE

## 2021-03-17 PROCEDURE — G8484 FLU IMMUNIZE NO ADMIN: HCPCS | Performed by: INTERNAL MEDICINE

## 2021-03-17 PROCEDURE — G8400 PT W/DXA NO RESULTS DOC: HCPCS | Performed by: INTERNAL MEDICINE

## 2021-03-17 PROCEDURE — 1036F TOBACCO NON-USER: CPT | Performed by: INTERNAL MEDICINE

## 2021-03-17 PROCEDURE — 4040F PNEUMOC VAC/ADMIN/RCVD: CPT | Performed by: INTERNAL MEDICINE

## 2021-03-17 ASSESSMENT — PATIENT HEALTH QUESTIONNAIRE - PHQ9
SUM OF ALL RESPONSES TO PHQ QUESTIONS 1-9: 0
1. LITTLE INTEREST OR PLEASURE IN DOING THINGS: 0
2. FEELING DOWN, DEPRESSED OR HOPELESS: 0

## 2021-03-17 NOTE — PROGRESS NOTES
MA Rooming Questions  Patient: Lennox Larson  MRN: [id number]    Date: 3/17/2021        1. Do you have any new issues?   no         2. Do you need any refills on medications?    no    3. Have you had any imaging done since your last visit?   no    4. Have you been hospitalized or seen in the emergency room since your last visit here?   no    5. Did the patient have a depression screening completed today?  Yes    PHQ-9 Total Score: 0 (3/17/2021  8:45 AM)       PHQ-9 Given to (if applicable):               PHQ-9 Score (if applicable):                     [] Positive     []  Negative              Does question #9 need addressed (if applicable)                     [] Yes    []  No               Destin Brooks MA

## 2021-04-29 RX ORDER — TRAVOPROST OPHTHALMIC SOLUTION 0.04 MG/ML
SOLUTION OPHTHALMIC
COMMUNITY
Start: 2021-04-29

## 2021-04-29 RX ORDER — LANOLIN ALCOHOL/MO/W.PET/CERES
CREAM (GRAM) TOPICAL
Qty: 30 TABLET | Refills: 1 | Status: SHIPPED | OUTPATIENT
Start: 2021-04-29 | End: 2021-05-26

## 2021-04-29 RX ORDER — METOPROLOL SUCCINATE 50 MG/1
TABLET, EXTENDED RELEASE ORAL
Qty: 90 TABLET | Refills: 0 | Status: SHIPPED | OUTPATIENT
Start: 2021-04-29 | End: 2021-07-21 | Stop reason: SDUPTHER

## 2021-05-26 RX ORDER — LANOLIN ALCOHOL/MO/W.PET/CERES
CREAM (GRAM) TOPICAL
Qty: 90 TABLET | Refills: 1 | Status: SHIPPED | OUTPATIENT
Start: 2021-05-26 | End: 2021-12-01 | Stop reason: SDUPTHER

## 2021-06-09 ENCOUNTER — HOSPITAL ENCOUNTER (OUTPATIENT)
Dept: INFUSION THERAPY | Age: 71
Discharge: HOME | End: 2021-06-09
Payer: MEDICARE

## 2021-06-09 DIAGNOSIS — C90.00 MULTIPLE MYELOMA NOT HAVING ACHIEVED REMISSION (HCC): ICD-10-CM

## 2021-06-09 LAB
ALBUMIN SERPL-MCNC: 3.9 GM/DL (ref 3.4–5)
ALP BLD-CCNC: 71 IU/L (ref 40–128)
ALT SERPL-CCNC: 13 U/L (ref 10–40)
ANION GAP SERPL CALCULATED.3IONS-SCNC: 9 MMOL/L (ref 4–16)
AST SERPL-CCNC: 17 IU/L (ref 15–37)
BASOPHILS ABSOLUTE: 0 K/CU MM
BASOPHILS RELATIVE PERCENT: 0.2 % (ref 0–1)
BILIRUB SERPL-MCNC: 0.3 MG/DL (ref 0–1)
BUN BLDV-MCNC: 28 MG/DL (ref 6–23)
CALCIUM SERPL-MCNC: 9.6 MG/DL (ref 8.3–10.6)
CHLORIDE BLD-SCNC: 106 MMOL/L (ref 99–110)
CO2: 25 MMOL/L (ref 21–32)
CREAT SERPL-MCNC: 0.9 MG/DL (ref 0.6–1.1)
DIFFERENTIAL TYPE: ABNORMAL
EOSINOPHILS ABSOLUTE: 0.1 K/CU MM
EOSINOPHILS RELATIVE PERCENT: 2.7 % (ref 0–3)
ERYTHROCYTE SEDIMENTATION RATE: 28 MM/HR (ref 0–30)
GFR AFRICAN AMERICAN: >60 ML/MIN/1.73M2
GFR NON-AFRICAN AMERICAN: >60 ML/MIN/1.73M2
GLUCOSE BLD-MCNC: 89 MG/DL (ref 70–99)
HCT VFR BLD CALC: 37.5 % (ref 37–47)
HEMOGLOBIN: 12.2 GM/DL (ref 12.5–16)
IGA: 383 MG/DL (ref 69–382)
IGG,SERUM: 1341 MG/DL (ref 723–1685)
IGM,SERUM: 47 MG/DL (ref 62–277)
LACTATE DEHYDROGENASE: 220 IU/L (ref 120–246)
LYMPHOCYTES ABSOLUTE: 1.6 K/CU MM
LYMPHOCYTES RELATIVE PERCENT: 33.7 % (ref 24–44)
MCH RBC QN AUTO: 29 PG (ref 27–31)
MCHC RBC AUTO-ENTMCNC: 32.5 % (ref 32–36)
MCV RBC AUTO: 89.3 FL (ref 78–100)
MONOCYTES ABSOLUTE: 0.5 K/CU MM
MONOCYTES RELATIVE PERCENT: 10.9 % (ref 0–4)
PDW BLD-RTO: 15.3 % (ref 11.7–14.9)
PLATELET # BLD: 195 K/CU MM (ref 140–440)
PMV BLD AUTO: 10.2 FL (ref 7.5–11.1)
POTASSIUM SERPL-SCNC: 5 MMOL/L (ref 3.5–5.1)
RBC # BLD: 4.2 M/CU MM (ref 4.2–5.4)
SEGMENTED NEUTROPHILS ABSOLUTE COUNT: 2.5 K/CU MM
SEGMENTED NEUTROPHILS RELATIVE PERCENT: 52.5 % (ref 36–66)
SODIUM BLD-SCNC: 140 MMOL/L (ref 135–145)
TOTAL PROTEIN: 6.5 GM/DL (ref 6.4–8.2)
WBC # BLD: 4.8 K/CU MM (ref 4–10.5)

## 2021-06-09 PROCEDURE — 86320 SERUM IMMUNOELECTROPHORESIS: CPT

## 2021-06-09 PROCEDURE — 85652 RBC SED RATE AUTOMATED: CPT

## 2021-06-09 PROCEDURE — 85025 COMPLETE CBC W/AUTO DIFF WBC: CPT

## 2021-06-09 PROCEDURE — 83615 LACTATE (LD) (LDH) ENZYME: CPT

## 2021-06-09 PROCEDURE — 80053 COMPREHEN METABOLIC PANEL: CPT

## 2021-06-09 PROCEDURE — 83883 ASSAY NEPHELOMETRY NOT SPEC: CPT

## 2021-06-09 PROCEDURE — 84165 PROTEIN E-PHORESIS SERUM: CPT

## 2021-06-09 PROCEDURE — 82784 ASSAY IGA/IGD/IGG/IGM EACH: CPT

## 2021-06-09 PROCEDURE — 82232 ASSAY OF BETA-2 PROTEIN: CPT

## 2021-06-09 PROCEDURE — 36415 COLL VENOUS BLD VENIPUNCTURE: CPT

## 2021-06-11 LAB
ALBUMIN ELP: 3.2 GM/DL (ref 3.2–5.6)
ALPHA-1-GLOBULIN: 0.2 GM/DL (ref 0.1–0.4)
ALPHA-2-GLOBULIN: 0.7 GM/DL (ref 0.4–1.2)
BETA GLOBULIN: 1.2 GM/DL (ref 0.5–1.3)
BETA-2 MICROGLOBULIN: 3.8 MG/L (ref 1.1–2.4)
GAMMA GLOBULIN: 1.2 GM/DL (ref 0.5–1.6)
KAPPA QUANT FREE LIGHT CHAINS: 27.18 MG/L (ref 3.3–19.4)
KAPPA/LAMBDA FREE LIGHT CHAIN RATIO: 0.22 (ref 0.26–1.65)
LAMBDA FREE LIGHT CHAINS QNT: 121.61 MG/L (ref 5.71–26.3)
SPEP INTERPRETATION: NORMAL
SPEP INTERPRETATION: NORMAL
TOTAL PROTEIN: 6.5 GM/DL (ref 6.4–8.2)

## 2021-06-14 ENCOUNTER — TELEPHONE (OUTPATIENT)
Dept: ONCOLOGY | Age: 71
End: 2021-06-14

## 2021-06-14 NOTE — TELEPHONE ENCOUNTER
Patient called to confirm her appt this Wed. 06/16 w/Dr Nila Brothers, patient states she will be here

## 2021-06-16 ENCOUNTER — OFFICE VISIT (OUTPATIENT)
Dept: ONCOLOGY | Age: 71
End: 2021-06-16
Payer: MEDICARE

## 2021-06-16 ENCOUNTER — HOSPITAL ENCOUNTER (OUTPATIENT)
Dept: INFUSION THERAPY | Age: 71
Discharge: HOME | End: 2021-06-16
Payer: MEDICARE

## 2021-06-16 VITALS
BODY MASS INDEX: 37.28 KG/M2 | TEMPERATURE: 96.8 F | SYSTOLIC BLOOD PRESSURE: 185 MMHG | WEIGHT: 232 LBS | HEIGHT: 66 IN | RESPIRATION RATE: 18 BRPM | HEART RATE: 59 BPM | OXYGEN SATURATION: 99 % | DIASTOLIC BLOOD PRESSURE: 85 MMHG

## 2021-06-16 DIAGNOSIS — C90.00 MULTIPLE MYELOMA NOT HAVING ACHIEVED REMISSION (HCC): Primary | ICD-10-CM

## 2021-06-16 PROCEDURE — 1090F PRES/ABSN URINE INCON ASSESS: CPT | Performed by: INTERNAL MEDICINE

## 2021-06-16 PROCEDURE — 1123F ACP DISCUSS/DSCN MKR DOCD: CPT | Performed by: INTERNAL MEDICINE

## 2021-06-16 PROCEDURE — 4040F PNEUMOC VAC/ADMIN/RCVD: CPT | Performed by: INTERNAL MEDICINE

## 2021-06-16 PROCEDURE — 99214 OFFICE O/P EST MOD 30 MIN: CPT | Performed by: INTERNAL MEDICINE

## 2021-06-16 PROCEDURE — G8400 PT W/DXA NO RESULTS DOC: HCPCS | Performed by: INTERNAL MEDICINE

## 2021-06-16 PROCEDURE — G8427 DOCREV CUR MEDS BY ELIG CLIN: HCPCS | Performed by: INTERNAL MEDICINE

## 2021-06-16 PROCEDURE — G8417 CALC BMI ABV UP PARAM F/U: HCPCS | Performed by: INTERNAL MEDICINE

## 2021-06-16 PROCEDURE — 99211 OFF/OP EST MAY X REQ PHY/QHP: CPT

## 2021-06-16 PROCEDURE — 3017F COLORECTAL CA SCREEN DOC REV: CPT | Performed by: INTERNAL MEDICINE

## 2021-06-16 PROCEDURE — 1036F TOBACCO NON-USER: CPT | Performed by: INTERNAL MEDICINE

## 2021-06-16 RX ORDER — ACYCLOVIR 400 MG/1
400 TABLET ORAL
Qty: 60 TABLET | Refills: 5 | Status: SHIPPED | OUTPATIENT
Start: 2021-06-16 | End: 2021-07-16

## 2021-06-16 NOTE — PROGRESS NOTES
MA Rooming Questions  Patient: Dre Henley  MRN: [id number]    Date: 6/16/2021        1. Do you have any new issues? Yes- knot in chest area         2. Do you need any refills on medications?    no    3. Have you had any imaging done since your last visit?   no    4. Have you been hospitalized or seen in the emergency room since your last visit here?   no    5. Did the patient have a depression screening completed today?  No    No data recorded     PHQ-9 Given to (if applicable):               PHQ-9 Score (if applicable):                     [] Positive     []  Negative              Does question #9 need addressed (if applicable)                     [] Yes    []  No               Merced Martinez CMA

## 2021-06-16 NOTE — PROGRESS NOTES
Patient Name: Rubio Mackenzie  Patient : 1950  Patient MRN: [id number]     Primary Oncologist: Hilary Rutledge MD  Referring Provider: Calli Alcantara MD     Date of Service: 2021      Chief Complaint:   Chief Complaint   Patient presents with    Follow-up     Patient Active Problem List:     Multiple myeloma not having achieved remission     HPI:   Ms. Glen Schlatter is a 72-year-old very pleasant patient with a medical history significant for stage II cervical cancer diagnosed in , status post laser surgery, gastroesophageal reflux disease, initially referred to me on 2014 for evaluation of right tibial intramedullary lesion. She stated that she has been having right leg pain since 2014, which has gradually been getting worse over time. She was seen by her primary care physician and had x-ray on 2014. It showed abnormal lucency within the tibial shaft at the junction of the mid and proximal third. MRI of the right lower extremity was done on 2014, and it showed marrow replacing 5.8 cm intramedullary lesion in the right mid tibial shaft with cortical breakthrough and adjacent periostitis. This corresponds to the lytic/lucent lesion on the recent x-ray. Differential considerations include lytic metastatic disease or multiple myeloma. She was subsequently referred to me for evaluation. She had upper sternum tumor for the last four years, which is about 8 by 10 cm in diameter. She otherwise does not have any other significant symptoms. 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 It is too small to measure the quantity. Her ESR was mildly elevated to 49.       Laboratory results done on September 3, 2014, showed normal CBC, mild elevation in serum creatinine (1.3), normal calcium (9.8), normal total protein (7.4), beta-2 microglobulin (4.2), triclonal gammopathy (2 IgA Lambda and 1 free Lambda light chain) on serum protein electrophoresis, two of which are large enough to measure on serum protein electrophoresis and which together total 300 mg/dl, ESR (60). Quantitative immunoglobulin IgA was elevated (760), IgM (41 mg/dl), and IgG (926). She also has 3.4 grams of protein in 23 hour urine and urine immunoelectrophoresis is consistent with monoclonal free Lambda light chains. She had biopsy of the right tibial lesions and sternal lesions on August 29, 2014 and final pathology was consistent with plasma cell myeloma. Flow cytometry was consistent with 23 percent of CD56 positive clonal plasma cell population, consistent with plasma cell neoplasms. FISH study showed \"Abnormal Myeloma FISH Profile. Monosomy for chromosome 13. Aneuploid population: Gain of chromosomes 15 and FGFR3/4p\". Cytogenetics wasn't performed by laboratory. Complete bone survey was done on September 3, 2014, and it showed multiple lucent lesions involving the visualized bony skeleton concerning for metastatic disease. This involves the skull, T10 vertebral body, left humerus, and right femur. Faint opacity overlying the left upper lobe could be related to atelectasis, mass, and/or infiltrate. Followup chest radiograph is recommended to assure stability/resolution. The lungs are under aerated, which limits evaluation. I believe faint opacity in the left upper lobe is most likely due to upper sternal lesion. Chemotherapy with Velcade, Revlimid and dexamethasone was started on September 16, 2014. Palliative radiation therapy to the right tibia lesion was also started by Dr. Leif Roldan on September 29, 2014 and she completed it on October 10, 2014.     Her serum free Lambda light chain on November 4, 2014, after the second cycle of chemotherapy was 1.25 (normal), after the first cycle of chemotherapy it chemotherapy until May 7, 2017, and we stopped it after that since she could not tolerate maintenance chemotherapy because of significant myelosuppression. She has been under close observation since then. She stated that she noticed increase in size of her upper sternum lesion. It was decreased and back to normal size after chemo in 2016. I recognize that her serum lambda light chain has gradually increased over time. In view of her enlarging upper sternum lesion along with increasing lambda light chain, I believe she has recurrent multiple myeloma. I offered her to have repeat scans and bone marrow biopsy to confirm progression of the disease. However, she does not want to have repeat scans because it exacerbates her vitiligo. She also does not want to have biopsy at this moment. However, she agrees to start chemotherapy with Velcade, Revlimid and dexamethasone. Since her laboratory work-ups and clinical findings are consistent with recurrent multiple myeloma, I will start first-line chemotherapy on July 7, 2021. Since she has been in remission for more than 4 years, I will rechallenge with velcade, revlimid and dexamethason. She is already on aspirin 81 mg daily and I recommend her to continue with that to prevent Revlimid induced thromboembolic episodes. I also recommend her to start acyclovir 400 mg twice daily to prevent shingles reactivation. I also recommend to continue with zoledronic acid every twelve month intervals and her next treatment will be on September, 2021. For her hypertension, I recommend to continue with metoprolol 50 mg daily for now. I also recommend her to continue with vitamin B12 supplementation. Health maintenance - I asked her to have age-appropriate cancer screening, exercise, low-fat and low-sodium diet. She said she is not ready to take covid vaccine at this moment. I will continue to monitor her blood pressure closely.       PAST MEDICAL HISTORY:  Past medical history is significant for the following. 1. Gastroesophageal reflux disease. 2. Stage II cervical cancer diagnosed in 1980, status post laser surgery. PAST SURGICAL HISTORY:  Past surgical history is significant for the following. 1. Cholecystectomy in 1998.  2. Tonsillectomy and appendectomy. 3. Tubal ligation in 1981. 4. Cheloid removal in October 2002. FAMILY HISTORY:  Family history is significant for small cell lung cancer in her mother. No other family history of cancer disease. SOCIAL HISTORY:  She denies smoking, alcohol drinking, and illicit drug abuse. She is  for 11 years and has two children. She is currently working at [email or web address]. ALLERGIES:  She claimed to have allergies to aspirin and iron. Review of Systems: \"Per interval history; otherwise 10 point ROS is negative. \"   Her energy level is stable, appetite, and sleep are good. She doesn't have fever, chills, night sweats, cough, shortness of breath, chest pain, hemoptysis, or palpitations. Her bowel and bladder functions are normal. She doesn't have nausea, vomiting, abdominal pain, diarrhea, constipation, dysuria, loss of appetite, or weight loss. She denies neuropathy and she doesn't have bleeding or clotting issues. She denies any pain in her body. No anxiety or depression. The rest of the systems are unremarkable.     Vital Signs:  BP (!) 185/85 (Site: Right Lower Arm, Position: Sitting, Cuff Size: Large Adult)   Pulse 59   Temp 96.8 °F (36 °C) (Temporal)   Resp 18   Ht 5' 6\" (1.676 m)   Wt 232 lb (105.2 kg)   SpO2 99%   BMI 37.45 kg/m²     Physical Exam:  CONSTITUTIONAL: awake, alert, cooperative, no apparent distress   EYES: pupils equal, round and reactive to light, sclera clear and conjunctiva normal  ENT: Normocephalic, without obvious abnormality, atraumatic  NECK: supple, symmetrical, no jugular venous distension and no carotid bruits   HEMATOLOGIC/LYMPHATIC: no cervical, supraclavicular or axillary lymphadenopathy   LUNGS: VBS, no crackles, clear to auscultation, no wheezes, no rhonchi, no increased work of breathing,   CARDIOVASCULAR: regular rate and rhythm, normal S1 and S2, no murmur noted  ABDOMEN: normal bowel sounds x 4, non-tender, no masses palpated, no hepatosplenomegaly,  soft, non-distended,   MUSCULOSKELETAL: full range of motion noted, tone is normal  NEUROLOGIC: awake, alert, oriented to name, place and time. Motor skills grossly intact. SKIN: Normal skin color, texture, turgor and no jaundice.  appears intact   EXTREMITIES: no cyanosis, no LE edema, no leg swelling, no clubbing     Labs:  Hematology:  Lab Results   Component Value Date    WBC 4.8 06/09/2021    RBC 4.20 06/09/2021    HGB 12.2 (L) 06/09/2021    HCT 37.5 06/09/2021    MCV 89.3 06/09/2021    MCH 29.0 06/09/2021    MCHC 32.5 06/09/2021    RDW 15.3 (H) 06/09/2021     06/09/2021    MPV 10.2 06/09/2021    BANDSPCT 5 03/08/2017    SEGSPCT 52.5 06/09/2021    EOSRELPCT 2.7 06/09/2021    BASOPCT 0.2 06/09/2021    LYMPHOPCT 33.7 06/09/2021    MONOPCT 10.9 (H) 06/09/2021    BANDABS 0.12 03/08/2017    SEGSABS 2.5 06/09/2021    EOSABS 0.1 06/09/2021    BASOSABS 0.0 06/09/2021    LYMPHSABS 1.6 06/09/2021    MONOSABS 0.5 06/09/2021    DIFFTYPE AUTOMATED DIFFERENTIAL 06/09/2021     Lab Results   Component Value Date    ESR 28 06/09/2021     Chemistry:  Lab Results   Component Value Date     06/09/2021    K 5.0 06/09/2021     06/09/2021    CO2 25 06/09/2021    BUN 28 (H) 06/09/2021    CREATININE 0.9 06/09/2021    GLUCOSE 89 06/09/2021    CALCIUM 9.6 06/09/2021    PROT 6.5 06/09/2021    PROT 6.5 06/09/2021    LABALBU 3.9 06/09/2021    BILITOT 0.3 06/09/2021    ALKPHOS 71 06/09/2021    AST 17 06/09/2021    ALT 13 06/09/2021    LABGLOM >60 06/09/2021    GFRAA >60 06/09/2021     Lab Results   Component Value Date     06/09/2021     No components found for: LD  Lab Results   Component Value Date

## 2021-06-17 ENCOUNTER — TELEPHONE (OUTPATIENT)
Dept: ONCOLOGY | Age: 71
End: 2021-06-17

## 2021-06-17 DIAGNOSIS — C90.00 MULTIPLE MYELOMA NOT HAVING ACHIEVED REMISSION (HCC): Primary | ICD-10-CM

## 2021-06-17 RX ORDER — LENALIDOMIDE 15 MG/1
15 CAPSULE ORAL DAILY
Qty: 14 CAPSULE | Refills: 5 | Status: SHIPPED | OUTPATIENT
Start: 2021-06-17 | End: 2021-07-19 | Stop reason: SDUPTHER

## 2021-06-17 NOTE — TELEPHONE ENCOUNTER
Returned call to pt and left message that she can get the Shingles Vaccine now before she starts treatment.

## 2021-06-17 NOTE — TELEPHONE ENCOUNTER
Patient left a message last night asking if she can get the Shingle vaccine, if so she can get it today at her work, please call

## 2021-06-18 RX ORDER — HEPARIN SODIUM (PORCINE) LOCK FLUSH IV SOLN 100 UNIT/ML 100 UNIT/ML
500 SOLUTION INTRAVENOUS PRN
Status: CANCELLED | OUTPATIENT
Start: 2021-07-21

## 2021-06-18 RX ORDER — METHYLPREDNISOLONE SODIUM SUCCINATE 125 MG/2ML
125 INJECTION, POWDER, LYOPHILIZED, FOR SOLUTION INTRAMUSCULAR; INTRAVENOUS ONCE
Status: CANCELLED | OUTPATIENT
Start: 2021-07-14 | End: 2021-07-14

## 2021-06-18 RX ORDER — SODIUM CHLORIDE 9 MG/ML
INJECTION, SOLUTION INTRAVENOUS CONTINUOUS
Status: CANCELLED | OUTPATIENT
Start: 2021-07-21

## 2021-06-18 RX ORDER — EPINEPHRINE 1 MG/ML
0.3 INJECTION, SOLUTION, CONCENTRATE INTRAVENOUS PRN
Status: CANCELLED | OUTPATIENT
Start: 2021-07-07

## 2021-06-18 RX ORDER — SODIUM CHLORIDE 9 MG/ML
25 INJECTION, SOLUTION INTRAVENOUS PRN
Status: CANCELLED | OUTPATIENT
Start: 2021-07-21

## 2021-06-18 RX ORDER — HEPARIN SODIUM (PORCINE) LOCK FLUSH IV SOLN 100 UNIT/ML 100 UNIT/ML
500 SOLUTION INTRAVENOUS PRN
Status: CANCELLED | OUTPATIENT
Start: 2021-07-07

## 2021-06-18 RX ORDER — SODIUM CHLORIDE 9 MG/ML
20 INJECTION, SOLUTION INTRAVENOUS ONCE
Status: CANCELLED | OUTPATIENT
Start: 2021-07-07 | End: 2021-07-07

## 2021-06-18 RX ORDER — DIPHENHYDRAMINE HYDROCHLORIDE 50 MG/ML
50 INJECTION INTRAMUSCULAR; INTRAVENOUS ONCE
Status: CANCELLED | OUTPATIENT
Start: 2021-07-14 | End: 2021-07-14

## 2021-06-18 RX ORDER — SODIUM CHLORIDE 9 MG/ML
INJECTION, SOLUTION INTRAVENOUS CONTINUOUS
Status: CANCELLED | OUTPATIENT
Start: 2021-07-14

## 2021-06-18 RX ORDER — DIPHENHYDRAMINE HYDROCHLORIDE 50 MG/ML
50 INJECTION INTRAMUSCULAR; INTRAVENOUS ONCE
Status: CANCELLED | OUTPATIENT
Start: 2021-07-07 | End: 2021-07-07

## 2021-06-18 RX ORDER — SODIUM CHLORIDE 9 MG/ML
25 INJECTION, SOLUTION INTRAVENOUS PRN
Status: CANCELLED | OUTPATIENT
Start: 2021-07-14

## 2021-06-18 RX ORDER — SODIUM CHLORIDE 9 MG/ML
INJECTION, SOLUTION INTRAVENOUS CONTINUOUS
Status: CANCELLED | OUTPATIENT
Start: 2021-07-07

## 2021-06-18 RX ORDER — SODIUM CHLORIDE 9 MG/ML
20 INJECTION, SOLUTION INTRAVENOUS ONCE
Status: CANCELLED | OUTPATIENT
Start: 2021-07-21 | End: 2021-07-21

## 2021-06-18 RX ORDER — HEPARIN SODIUM (PORCINE) LOCK FLUSH IV SOLN 100 UNIT/ML 100 UNIT/ML
500 SOLUTION INTRAVENOUS PRN
Status: CANCELLED | OUTPATIENT
Start: 2021-07-14

## 2021-06-18 RX ORDER — SODIUM CHLORIDE 9 MG/ML
25 INJECTION, SOLUTION INTRAVENOUS PRN
Status: CANCELLED | OUTPATIENT
Start: 2021-07-07

## 2021-06-18 RX ORDER — METHYLPREDNISOLONE SODIUM SUCCINATE 125 MG/2ML
125 INJECTION, POWDER, LYOPHILIZED, FOR SOLUTION INTRAMUSCULAR; INTRAVENOUS ONCE
Status: CANCELLED | OUTPATIENT
Start: 2021-07-07 | End: 2021-07-07

## 2021-06-18 RX ORDER — SODIUM CHLORIDE 0.9 % (FLUSH) 0.9 %
5-40 SYRINGE (ML) INJECTION PRN
Status: CANCELLED | OUTPATIENT
Start: 2021-07-07

## 2021-06-18 RX ORDER — EPINEPHRINE 1 MG/ML
0.3 INJECTION, SOLUTION, CONCENTRATE INTRAVENOUS PRN
Status: CANCELLED | OUTPATIENT
Start: 2021-07-14

## 2021-06-18 RX ORDER — DIPHENHYDRAMINE HYDROCHLORIDE 50 MG/ML
50 INJECTION INTRAMUSCULAR; INTRAVENOUS ONCE
Status: CANCELLED | OUTPATIENT
Start: 2021-07-21 | End: 2021-07-21

## 2021-06-18 RX ORDER — METHYLPREDNISOLONE SODIUM SUCCINATE 125 MG/2ML
125 INJECTION, POWDER, LYOPHILIZED, FOR SOLUTION INTRAMUSCULAR; INTRAVENOUS ONCE
Status: CANCELLED | OUTPATIENT
Start: 2021-07-21 | End: 2021-07-21

## 2021-06-18 RX ORDER — SODIUM CHLORIDE 9 MG/ML
20 INJECTION, SOLUTION INTRAVENOUS ONCE
Status: CANCELLED | OUTPATIENT
Start: 2021-07-14 | End: 2021-07-14

## 2021-06-18 RX ORDER — EPINEPHRINE 1 MG/ML
0.3 INJECTION, SOLUTION, CONCENTRATE INTRAVENOUS PRN
Status: CANCELLED | OUTPATIENT
Start: 2021-07-21

## 2021-06-18 RX ORDER — SODIUM CHLORIDE 0.9 % (FLUSH) 0.9 %
5-40 SYRINGE (ML) INJECTION PRN
Status: CANCELLED | OUTPATIENT
Start: 2021-07-14

## 2021-06-18 RX ORDER — SODIUM CHLORIDE 0.9 % (FLUSH) 0.9 %
5-40 SYRINGE (ML) INJECTION PRN
Status: CANCELLED | OUTPATIENT
Start: 2021-07-21

## 2021-06-28 ENCOUNTER — TELEPHONE (OUTPATIENT)
Dept: ONCOLOGY | Age: 71
End: 2021-06-28

## 2021-06-28 NOTE — TELEPHONE ENCOUNTER
Patient was calling for a refill on triamcinolone cream, I don't see it on her list, she states she had to use it the last time she took the Revlimid d/t her being allergic to the medication, can you please send in an Rx for the triamcinolone cream to [address] [place] [address]

## 2021-07-01 DIAGNOSIS — C90.00 MULTIPLE MYELOMA NOT HAVING ACHIEVED REMISSION (HCC): Primary | ICD-10-CM

## 2021-07-05 NOTE — PROGRESS NOTES
Patient Name: Janeth Molina    Patient : 1950     Patient MRN: [id number]       Date: 2021                                                                                                   CHEMOTHERAPY TREATMENT PLAN    Care Team  Medical Oncologist: Jean Paul Huff MD  Surgeon:   Radiation Oncologist:   Primary Care Physician: Myra Ugalde MD     Learning Needs Assessment  Before the treatment plan was discussed and the consent was signed, the care team assessed the patient's learning needs. This includes their ability to assume responsibility for managing their therapy. Diagnosis      multiple myeloma   RISS stage II    The Goal of My Therapy is    (  ) To cure my cancer  (  ) To shrink the tumor prior to surgery  (  ) Increase my chance of cure after surgery  (x) Help me live as long as possible with the highest quality of life. I know that a cure is not possible.      Prognosis    (  ) Curable  (  ) Palliative  (x) control  Plan Of Treatment    Intent:  (  ) Neoadjuvant   (  ) Adjuvant   (x) Control    Treatment Regimen  (including supportive meds)                             Frequency                                               Duration     bortezomib weekly   lenalidomide po days 1-14 of 21 day cycle    Expected Response to Treatment    (X) This therapy could cure your disease  (  ) This therapy has a good chance of helping you live 1 year or more compared to without it  (  ) This therapy has a good chance of helping you feel better or making you live months longer compared to without it     Quality Of Life    (  ) Expect that you will be able to continue all normal activities  (X) Expect that you will have some side effects but should be able to maintain normal activities  (  ) Expect that you will be unable to work but able to perform light duties at home  (  ) Expect that you will be able to perform light duties and will need assistance with self care    Treatment Benefits and [place] patient discussing the intent and schedule of their treatment. The patient was given a copy of their treatment summary. Questions and concerns were addressed with the patient. Time spent with the patient face to face today was 60 minutes, counseling and coordination of care dominated more than 50% of this patient encounter. Tahmina Vallejo presented for treatment planning for Revlimid, Velcade, dexamethasone. We discussed potential side effects including but not limited to: Myelosuppression, neuropathy, blood clots, bleeding, diarrhea, constipation, itching, rash, dyspnea, nausea, fatigue, dizziness, muscle cramps, edema, nephro toxicity, hepatotoxicity, allergic reaction, TLS, reactivation of HSV, etc.  Zofran per protocol. Instructed patient to call our office for temperature 100.4 or greater. On-call number was provided. Prophylaxis: Taking ASA daily to prevent thromboembolic episodes. She is taking acyclovir 400 mg twice daily. Reviewed with Dr. Chris Babb, we will defer Bactrim. Peripheral neuropathy: stable; tips of fingers and toes present since last treatment    Advance directive: paperwork provided    Counseling: declines    [place]- declines    /60 (Site: Right Upper Arm, Position: Sitting, Cuff Size: Medium Adult)   Pulse 72   Temp 96.3 °F (35.7 °C) (Infrared)   Ht 5' 6\" (1.676 m)   Wt 234 lb (106.1 kg)   SpO2 94%   BMI 37.77 kg/m²     Physical Exam  Vitals reviewed. HENT:      Head: Normocephalic. Nose: Nose normal.      Mouth/Throat:      Mouth: Mucous membranes are moist.   Eyes:      Extraocular Movements: Extraocular movements intact. Conjunctiva/sclera: Conjunctivae normal.   Cardiovascular:      Rate and Rhythm: Normal rate and regular rhythm. Pulmonary:      Breath sounds: Normal breath sounds. Abdominal:      General: Bowel sounds are normal.      Palpations: Abdomen is soft. Musculoskeletal:         General: Normal range of motion.       Cervical back: Neck supple. Comments: BLE errythema; chronic stable  Visible mass to superior aspect of manubrium   Skin:     General: Skin is warm and dry. Neurological:      Mental Status: She is alert and oriented to person, place, and time. Psychiatric:         Mood and Affect: Mood normal.         Behavior: Behavior normal.         Thought Content: Thought content normal.         Judgment: Judgment normal.         Follow up as previously scheduled.

## 2021-07-06 ENCOUNTER — OFFICE VISIT (OUTPATIENT)
Dept: ONCOLOGY | Age: 71
End: 2021-07-06
Payer: MEDICARE

## 2021-07-06 ENCOUNTER — HOSPITAL ENCOUNTER (OUTPATIENT)
Dept: INFUSION THERAPY | Age: 71
Discharge: HOME | End: 2021-07-06
Payer: MEDICARE

## 2021-07-06 ENCOUNTER — CLINICAL DOCUMENTATION (OUTPATIENT)
Dept: ONCOLOGY | Age: 71
End: 2021-07-06

## 2021-07-06 VITALS
BODY MASS INDEX: 37.61 KG/M2 | HEIGHT: 66 IN | HEART RATE: 72 BPM | DIASTOLIC BLOOD PRESSURE: 60 MMHG | SYSTOLIC BLOOD PRESSURE: 139 MMHG | TEMPERATURE: 96.3 F | WEIGHT: 234 LBS | OXYGEN SATURATION: 94 %

## 2021-07-06 DIAGNOSIS — C90.00 MULTIPLE MYELOMA NOT HAVING ACHIEVED REMISSION (HCC): ICD-10-CM

## 2021-07-06 DIAGNOSIS — C90.00 MULTIPLE MYELOMA NOT HAVING ACHIEVED REMISSION (HCC): Primary | ICD-10-CM

## 2021-07-06 LAB
ALBUMIN SERPL-MCNC: 4 GM/DL (ref 3.4–5)
ALP BLD-CCNC: 74 IU/L (ref 40–128)
ALT SERPL-CCNC: 15 U/L (ref 10–40)
ANION GAP SERPL CALCULATED.3IONS-SCNC: 10 MMOL/L (ref 4–16)
AST SERPL-CCNC: 19 IU/L (ref 15–37)
BASOPHILS ABSOLUTE: 0 K/CU MM
BASOPHILS RELATIVE PERCENT: 0.3 % (ref 0–1)
BILIRUB SERPL-MCNC: 0.3 MG/DL (ref 0–1)
BUN BLDV-MCNC: 34 MG/DL (ref 6–23)
CALCIUM SERPL-MCNC: 10 MG/DL (ref 8.3–10.6)
CHLORIDE BLD-SCNC: 105 MMOL/L (ref 99–110)
CO2: 27 MMOL/L (ref 21–32)
CREAT SERPL-MCNC: 1 MG/DL (ref 0.6–1.1)
DIFFERENTIAL TYPE: ABNORMAL
EOSINOPHILS ABSOLUTE: 0.2 K/CU MM
EOSINOPHILS RELATIVE PERCENT: 2.3 % (ref 0–3)
GFR AFRICAN AMERICAN: >60 ML/MIN/1.73M2
GFR NON-AFRICAN AMERICAN: 55 ML/MIN/1.73M2
GLUCOSE BLD-MCNC: 109 MG/DL (ref 70–99)
HCT VFR BLD CALC: 38.2 % (ref 37–47)
HEMOGLOBIN: 12.3 GM/DL (ref 12.5–16)
LYMPHOCYTES ABSOLUTE: 1.9 K/CU MM
LYMPHOCYTES RELATIVE PERCENT: 29.8 % (ref 24–44)
MCH RBC QN AUTO: 28.9 PG (ref 27–31)
MCHC RBC AUTO-ENTMCNC: 32.2 % (ref 32–36)
MCV RBC AUTO: 89.7 FL (ref 78–100)
MONOCYTES ABSOLUTE: 0.8 K/CU MM
MONOCYTES RELATIVE PERCENT: 12.6 % (ref 0–4)
PDW BLD-RTO: 16.1 % (ref 11.7–14.9)
PLATELET # BLD: 222 K/CU MM (ref 140–440)
PMV BLD AUTO: 9.7 FL (ref 7.5–11.1)
POTASSIUM SERPL-SCNC: 4.4 MMOL/L (ref 3.5–5.1)
RBC # BLD: 4.26 M/CU MM (ref 4.2–5.4)
SEGMENTED NEUTROPHILS ABSOLUTE COUNT: 3.6 K/CU MM
SEGMENTED NEUTROPHILS RELATIVE PERCENT: 55 % (ref 36–66)
SODIUM BLD-SCNC: 142 MMOL/L (ref 135–145)
TOTAL PROTEIN: 7.5 GM/DL (ref 6.4–8.2)
WBC # BLD: 6.5 K/CU MM (ref 4–10.5)

## 2021-07-06 PROCEDURE — 99213 OFFICE O/P EST LOW 20 MIN: CPT

## 2021-07-06 PROCEDURE — 85025 COMPLETE CBC W/AUTO DIFF WBC: CPT

## 2021-07-06 PROCEDURE — 99215 OFFICE O/P EST HI 40 MIN: CPT | Performed by: NURSE PRACTITIONER

## 2021-07-06 PROCEDURE — 36415 COLL VENOUS BLD VENIPUNCTURE: CPT

## 2021-07-06 PROCEDURE — 1123F ACP DISCUSS/DSCN MKR DOCD: CPT | Performed by: NURSE PRACTITIONER

## 2021-07-06 PROCEDURE — 4040F PNEUMOC VAC/ADMIN/RCVD: CPT | Performed by: NURSE PRACTITIONER

## 2021-07-06 PROCEDURE — G8427 DOCREV CUR MEDS BY ELIG CLIN: HCPCS | Performed by: NURSE PRACTITIONER

## 2021-07-06 PROCEDURE — 3017F COLORECTAL CA SCREEN DOC REV: CPT | Performed by: NURSE PRACTITIONER

## 2021-07-06 PROCEDURE — 80053 COMPREHEN METABOLIC PANEL: CPT

## 2021-07-06 PROCEDURE — G8400 PT W/DXA NO RESULTS DOC: HCPCS | Performed by: NURSE PRACTITIONER

## 2021-07-06 PROCEDURE — G8417 CALC BMI ABV UP PARAM F/U: HCPCS | Performed by: NURSE PRACTITIONER

## 2021-07-06 PROCEDURE — 1036F TOBACCO NON-USER: CPT | Performed by: NURSE PRACTITIONER

## 2021-07-06 PROCEDURE — 1090F PRES/ABSN URINE INCON ASSESS: CPT | Performed by: NURSE PRACTITIONER

## 2021-07-06 RX ORDER — DEXAMETHASONE 4 MG/1
TABLET ORAL
Qty: 30 TABLET | Refills: 2 | Status: SHIPPED | OUTPATIENT
Start: 2021-07-06 | End: 2021-07-06 | Stop reason: SDUPTHER

## 2021-07-06 RX ORDER — MULTIVITAMIN,THERAPEUTIC
1 TABLET ORAL DAILY
COMMUNITY

## 2021-07-06 RX ORDER — ONDANSETRON HYDROCHLORIDE 8 MG/1
8 TABLET, FILM COATED ORAL EVERY 8 HOURS PRN
Qty: 30 TABLET | Refills: 1 | Status: SHIPPED | OUTPATIENT
Start: 2021-07-06

## 2021-07-06 RX ORDER — MAGNESIUM OXIDE 400 MG/1
400 TABLET ORAL DAILY
COMMUNITY

## 2021-07-06 RX ORDER — DEXAMETHASONE 4 MG/1
TABLET ORAL
Qty: 30 TABLET | Refills: 2 | Status: SHIPPED | OUTPATIENT
Start: 2021-07-06 | End: 2021-12-01 | Stop reason: SDUPTHER

## 2021-07-06 NOTE — PROGRESS NOTES
Spoke with patient during OCM and completed the Financial Navigation piece. Advised patient of the following possible assistance programs: CACC and LLS. Patient stated she didn't not have a need for assistance. We also discussed a insurance document she brought in which I explained was just her copy of her approval for Velcade injections from 6/18/21-6/18/22. Advised her this is not a guarantee of payment. She understood. My card was given in the event she needed my services.

## 2021-07-06 NOTE — PROGRESS NOTES
MA Rooming Questions  Patient: Karie Galaena  MRN: [id number]    Date: 7/6/2021        1. Do you have any new issues?   no         2. Do you need any refills on medications?    no    3. Have you had any imaging done since your last visit?   no    4. Have you been hospitalized or seen in the emergency room since your last visit here?   no    5. Did the patient have a depression screening completed today?  No    No data recorded     PHQ-9 Given to (if applicable):               PHQ-9 Score (if applicable):                     [] Positive     []  Negative              Does question #9 need addressed (if applicable)                     [] Yes    []  No               Kp Gilliam CMA

## 2021-07-06 NOTE — PROGRESS NOTES
Pt here for treatment planning. Discussed treatment plan, potential side effects, prevention, and management. Pt expressed understanding and signed consent. Pt given a copy of signed consent/treatment plan and drug monograph. Labs drawn. Verified apt for treatment start.

## 2021-07-07 ENCOUNTER — HOSPITAL ENCOUNTER (OUTPATIENT)
Dept: INFUSION THERAPY | Age: 71
Discharge: HOME | End: 2021-07-07
Payer: MEDICARE

## 2021-07-07 VITALS
OXYGEN SATURATION: 97 % | HEIGHT: 66 IN | BODY MASS INDEX: 37.28 KG/M2 | SYSTOLIC BLOOD PRESSURE: 171 MMHG | RESPIRATION RATE: 16 BRPM | DIASTOLIC BLOOD PRESSURE: 77 MMHG | HEART RATE: 69 BPM | WEIGHT: 232 LBS | TEMPERATURE: 97.9 F

## 2021-07-07 DIAGNOSIS — C90.00 MULTIPLE MYELOMA NOT HAVING ACHIEVED REMISSION (HCC): Primary | ICD-10-CM

## 2021-07-07 PROCEDURE — 6360000002 HC RX W HCPCS: Performed by: INTERNAL MEDICINE

## 2021-07-07 PROCEDURE — 96367 TX/PROPH/DG ADDL SEQ IV INF: CPT

## 2021-07-07 PROCEDURE — 96401 CHEMO ANTI-NEOPL SQ/IM: CPT

## 2021-07-07 PROCEDURE — 96365 THER/PROPH/DIAG IV INF INIT: CPT

## 2021-07-07 PROCEDURE — 2580000003 HC RX 258: Performed by: INTERNAL MEDICINE

## 2021-07-07 RX ORDER — SODIUM CHLORIDE 0.9 % (FLUSH) 0.9 %
5-40 SYRINGE (ML) INJECTION PRN
Status: DISCONTINUED | OUTPATIENT
Start: 2021-07-07 | End: 2021-07-08 | Stop reason: HOSPADM

## 2021-07-07 RX ORDER — SODIUM CHLORIDE 9 MG/ML
20 INJECTION, SOLUTION INTRAVENOUS ONCE
Status: DISCONTINUED | OUTPATIENT
Start: 2021-07-07 | End: 2021-07-08 | Stop reason: HOSPADM

## 2021-07-07 RX ADMIN — SODIUM CHLORIDE 2.88 MG: 9 INJECTION INTRAMUSCULAR; INTRAVENOUS; SUBCUTANEOUS at 10:36

## 2021-07-07 RX ADMIN — SODIUM CHLORIDE 20 ML/HR: 900 INJECTION, SOLUTION INTRAVENOUS at 10:35

## 2021-07-07 RX ADMIN — ZOLEDRONIC ACID 4 MG: 4 INJECTION INTRAVENOUS at 10:36

## 2021-07-07 NOTE — PROGRESS NOTES
Pt. Here for treatment. Pt. Denies any questions or concerns. Lab work drawn via peripheral vein. Treatment administered without incident. Patient's status assessed and documented appropriately. All labs and required results were also reviewed today. Treatment parameters have been reviewed. Today's treatment has been approved by the provider. Treatment orders and medication sequencing (when applicable) was verified by 2 registered nurses. The treatment plan was confirmed with the patient prior to administration, and the patient understands the need to report any treatment-related symptoms. Prior to administration, when applicable, the following 8 elements of medication administration were reviewed with 2nd Registered Nurse prior to dosing: drug name, drug dose, infusion volume when prepared in a syringe, rate of administration, expiration dates and/or times, appearance and integrity of drug(s), and rate of pump for infusion. The 5 rights of medication administration have been verified.

## 2021-07-14 ENCOUNTER — CLINICAL DOCUMENTATION (OUTPATIENT)
Dept: ONCOLOGY | Age: 71
End: 2021-07-14

## 2021-07-14 ENCOUNTER — HOSPITAL ENCOUNTER (OUTPATIENT)
Dept: INFUSION THERAPY | Age: 71
Discharge: HOME | End: 2021-07-14
Payer: MEDICARE

## 2021-07-14 VITALS
DIASTOLIC BLOOD PRESSURE: 76 MMHG | HEIGHT: 66 IN | HEART RATE: 73 BPM | TEMPERATURE: 97.1 F | OXYGEN SATURATION: 99 % | SYSTOLIC BLOOD PRESSURE: 159 MMHG | BODY MASS INDEX: 37.09 KG/M2 | WEIGHT: 230.8 LBS | RESPIRATION RATE: 17 BRPM

## 2021-07-14 DIAGNOSIS — C90.00 MULTIPLE MYELOMA NOT HAVING ACHIEVED REMISSION (HCC): Primary | ICD-10-CM

## 2021-07-14 LAB
ALBUMIN SERPL-MCNC: 4 GM/DL (ref 3.4–5)
ALP BLD-CCNC: 78 IU/L (ref 40–128)
ALT SERPL-CCNC: 16 U/L (ref 10–40)
ANION GAP SERPL CALCULATED.3IONS-SCNC: 12 MMOL/L (ref 4–16)
AST SERPL-CCNC: 15 IU/L (ref 15–37)
BASOPHILS ABSOLUTE: 0 K/CU MM
BASOPHILS RELATIVE PERCENT: 0 % (ref 0–1)
BILIRUB SERPL-MCNC: 0.6 MG/DL (ref 0–1)
BUN BLDV-MCNC: 24 MG/DL (ref 6–23)
CALCIUM SERPL-MCNC: 9.6 MG/DL (ref 8.3–10.6)
CHLORIDE BLD-SCNC: 103 MMOL/L (ref 99–110)
CO2: 25 MMOL/L (ref 21–32)
CREAT SERPL-MCNC: 0.9 MG/DL (ref 0.6–1.1)
DIFFERENTIAL TYPE: ABNORMAL
EOSINOPHILS ABSOLUTE: 0.1 K/CU MM
EOSINOPHILS RELATIVE PERCENT: 1.5 % (ref 0–3)
GFR AFRICAN AMERICAN: 54 ML/MIN/1.73M2
GFR AFRICAN AMERICAN: >60 ML/MIN/1.73M2
GFR NON-AFRICAN AMERICAN: 45 ML/MIN/1.73M2
GFR NON-AFRICAN AMERICAN: >60 ML/MIN/1.73M2
GLUCOSE BLD-MCNC: 103 MG/DL (ref 70–99)
GLUCOSE BLD-MCNC: 114 MG/DL (ref 70–99)
HCT VFR BLD CALC: 37.6 % (ref 37–47)
HEMOGLOBIN: 12.1 GM/DL (ref 12.5–16)
LYMPHOCYTES ABSOLUTE: 0.8 K/CU MM
LYMPHOCYTES RELATIVE PERCENT: 11.5 % (ref 24–44)
MCH RBC QN AUTO: 28.7 PG (ref 27–31)
MCHC RBC AUTO-ENTMCNC: 32.2 % (ref 32–36)
MCV RBC AUTO: 89.3 FL (ref 78–100)
MONOCYTES ABSOLUTE: 0.2 K/CU MM
MONOCYTES RELATIVE PERCENT: 3.1 % (ref 0–4)
PDW BLD-RTO: 16.1 % (ref 11.7–14.9)
PLATELET # BLD: 115 K/CU MM (ref 140–440)
PMV BLD AUTO: 12.4 FL (ref 7.5–11.1)
POC BUN: 24 MG/DL (ref 8–26)
POC CALCIUM: 1.13 MMOL/L (ref 1.12–1.32)
POC CHLORIDE: 105 MMOL/L (ref 98–109)
POC CO2: 24 MMOL/L (ref 21–32)
POC CREATININE: 1.2 MG/DL (ref 0.6–1.1)
POTASSIUM SERPL-SCNC: 4.6 MMOL/L (ref 3.5–4.5)
POTASSIUM SERPL-SCNC: 4.6 MMOL/L (ref 3.5–5.1)
RBC # BLD: 4.21 M/CU MM (ref 4.2–5.4)
SEGMENTED NEUTROPHILS ABSOLUTE COUNT: 6 K/CU MM
SEGMENTED NEUTROPHILS RELATIVE PERCENT: 83.9 % (ref 36–66)
SODIUM BLD-SCNC: 140 MMOL/L (ref 135–145)
SODIUM BLD-SCNC: 140 MMOL/L (ref 138–146)
SOURCE, BLOOD GAS: ABNORMAL
TOTAL PROTEIN: 7.2 GM/DL (ref 6.4–8.2)
WBC # BLD: 7.1 K/CU MM (ref 4–10.5)

## 2021-07-14 PROCEDURE — 6360000002 HC RX W HCPCS: Performed by: INTERNAL MEDICINE

## 2021-07-14 PROCEDURE — 85025 COMPLETE CBC W/AUTO DIFF WBC: CPT

## 2021-07-14 PROCEDURE — 96401 CHEMO ANTI-NEOPL SQ/IM: CPT

## 2021-07-14 PROCEDURE — 80053 COMPREHEN METABOLIC PANEL: CPT

## 2021-07-14 PROCEDURE — 2580000003 HC RX 258: Performed by: INTERNAL MEDICINE

## 2021-07-14 PROCEDURE — 36415 COLL VENOUS BLD VENIPUNCTURE: CPT

## 2021-07-14 RX ORDER — ACETAMINOPHEN 500 MG
1000 TABLET ORAL EVERY 6 HOURS PRN
COMMUNITY
End: 2021-10-28

## 2021-07-14 RX ADMIN — BORTEZOMIB 2.88 MG: 3.5 INJECTION, POWDER, LYOPHILIZED, FOR SOLUTION INTRAVENOUS; SUBCUTANEOUS at 12:01

## 2021-07-14 NOTE — PROGRESS NOTES
Pt. Here for injection. Pt. Denies any questions or concerns. Lab work reviewed. Injection given as ordered. Pt. Tolerated well. Patient's status assessed and documented appropriately. All labs and required results were also reviewed today. Treatment parameters have been reviewed. Today's treatment has been approved by the provider. Treatment orders and medication sequencing (when applicable) was verified by 2 registered nurses. The treatment plan was confirmed with the patient prior to administration, and the patient understands the need to report any treatment-related symptoms. Prior to administration, when applicable, the following 8 elements of medication administration were reviewed with 2nd Registered Nurse prior to dosing: drug name, drug dose, infusion volume when prepared in a syringe, rate of administration, expiration dates and/or times, appearance and integrity of drug(s), and rate of pump for infusion. The 5 rights of medication administration have been verified.

## 2021-07-14 NOTE — PROGRESS NOTES
[facility] Critical [facility]ness Claims form completed, patient picked up

## 2021-07-15 ENCOUNTER — CLINICAL DOCUMENTATION (OUTPATIENT)
Dept: ONCOLOGY | Age: 71
End: 2021-07-15

## 2021-07-19 DIAGNOSIS — C90.00 MULTIPLE MYELOMA NOT HAVING ACHIEVED REMISSION (HCC): ICD-10-CM

## 2021-07-19 RX ORDER — LENALIDOMIDE 15 MG/1
15 CAPSULE ORAL DAILY
Qty: 14 CAPSULE | Refills: 5 | Status: SHIPPED | OUTPATIENT
Start: 2021-07-19 | End: 2021-08-06

## 2021-07-19 NOTE — PROGRESS NOTES
Patient Name: Dalia Graham  Patient : 1950  Patient MRN: [id number]     Primary Oncologist: Ben Beverly MD  Referring Provider: Isauro Rodríguez MD     Date of Service: 2021      Chief Complaint:   Chief Complaint   Patient presents with    Follow-up    Chemotherapy     Patient Active Problem List:     Multiple myeloma not having achieved remission     HPI:   Ms. Yuly Clark is a 72-year-old very pleasant patient with a medical history significant for stage II cervical cancer diagnosed in , status post laser surgery, gastroesophageal reflux disease, initially referred to me on 2014 for evaluation of right tibial intramedullary lesion. She stated that she has been having right leg pain since 2014, which has gradually been getting worse over time. She was seen by her primary care physician and had x-ray on 2014. It showed abnormal lucency within the tibial shaft at the junction of the mid and proximal third. MRI of the right lower extremity was done on 2014, and it showed marrow replacing 5.8 cm intramedullary lesion in the right mid tibial shaft with cortical breakthrough and adjacent periostitis. This corresponds to the lytic/lucent lesion on the recent x-ray. Differential considerations include lytic metastatic disease or multiple myeloma. She was subsequently referred to me for evaluation. She had upper sternum tumor for the last four years, which is about 8 by 10 cm in diameter. She otherwise does not have any other significant symptoms. 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 It is too small to measure the quantity. Her ESR was mildly elevated to 49.       Laboratory results done on September 3, 2014, showed normal CBC, mild elevation in serum creatinine chemotherapy it was 4.32 (October 1, 2014), and decreased from pretreatment value of 141 on September 3, 2014. Her 24-hour urine protein has decreased to 90 mg per 24 hour on November 25, 2014, after third cycle of chemotherapy. It has decreased from 3.4 grams before chemotherapy. Ms. Marline Anand completed her eight cycle of chemotherapy with Velcade, Revlimid and dexamethasone on February 27, 2015. Maintenance chemotherapy with Velcade was started on March 17, 2015. Since Ms. Marline Anand developed significant neuropathy from maintenance chemotherapy with Velcade, we stopped Velcade on July 19, 2016. Since she has minimal residual disease and she is not considering stem-cell transplantation, I recommend to start melphalan and dexamethasone as a maintenance chemotherapy. It was started since August 11, 2016 and we stopped it on November 18, 2016, since melphalan is not available as of November 16, 2016. Since we could not get melphalan, we changed her chemotherapy to Revlimid and dexamethasone. She has been on Revlimid until January 18, 2017. I stopped the Revlimid since she could not tolerate Revlimid and dexamethasone very well. She also developed right lower extremity deep vein thrombosis secondary to the Revlimid. Anticoagulation therapy with Eliquis was started since January 18, 2017. We resumed her back on melphalan and prednisone since January 18, 2017. Eliquis was stopped on May 17, 2017, since she has been on Eliquis for about four months duration. Her D-dimer level done on May 17, 2017, was also within the normal range. Ms. Marline Anand was found to have leukopenia, anemia, and thrombocytopenia on a blood test done on May 17, 2017, and I believe it is due to chemotherapy. Her chemotherapy was held since May 17, 2017. Since she is found to have recurrent multiple myeloma, we started chemotherapy with Velcade, Revlimid and dexamethasone since July 7, 2021.   Zoledronic acid was started on July 7, 2021 and we will continue to give it every 12 week intervals. On July 21, 2021, she presented to me for followup. I have been following Ms. Finesse Raymond for multiple myeloma and she is currently on close observation. She was on maintenance chemotherapy until May 7, 2017, and we stopped it after that since she could not tolerate maintenance chemotherapy because of significant myelosuppression. She has been under close observation since then. She stated that she noticed increase in size of her upper sternum lesion. It was decreased and back to normal size after chemo in 2016. I recognize that her serum lambda light chain has gradually increased over time. In view of her enlarging upper sternum lesion along with increasing lambda light chain, I believe she has recurrent multiple myeloma. I offered her to have repeat scans and bone marrow biopsy to confirm progression of the disease. However, she does not want to have repeat scans because it exacerbates her vitiligo. She also does not want to have biopsy at this moment. However, she agrees to start chemotherapy with Velcade, Revlimid and dexamethasone. Since her laboratory work-ups and clinical findings are consistent with recurrent multiple myeloma, we started first-line chemotherapy on July 7, 2021. Since she has been in remission for more than 4 years, we decided to rechallenge with velcade, revlimid and dexamethason. She is tolerating current chemotherapy very well and she does not encounter any major side effects from it. I recognize that her upper sternal mass is decreasing in size and I believe she is achieving response to current chemotherapy. I will repeat multiple myeloma panel again on July 28, 2021 after first cycle of chemotherapy. Revlimid induced thrombosis prophylaxis - I recommend her to continue with aspirin 81 mg daily.      Herpes zoster prophylaxis - I recommend her to continue with acyclovir 400 mg twice daily. I also recommend to continue with zoledronic acid every twelve month intervals and her next treatment will be on September 29, 2021. For her hypertension, I recommend to continue with metoprolol 50 mg daily for now. Her blood pressure reading on today is normal. I also recommend her to continue with vitamin B12 supplementation. Health maintenance - I asked her to have age-appropriate cancer screening, exercise, low-fat and low-sodium diet. She said she is not ready to take covid vaccine at this moment. I will continue to monitor her blood pressure closely. PAST MEDICAL HISTORY:  Past medical history is significant for the following. 1. Gastroesophageal reflux disease. 2. Stage II cervical cancer diagnosed in 1980, status post laser surgery. PAST SURGICAL HISTORY:  Past surgical history is significant for the following. 1. Cholecystectomy in 1998.  2. Tonsillectomy and appendectomy. 3. Tubal ligation in 1981. 4. Cheloid removal in October 2002. FAMILY HISTORY:  Family history is significant for small cell lung cancer in her mother. No other family history of cancer disease. SOCIAL HISTORY:  She denies smoking, alcohol drinking, and illicit drug abuse. She is  for 11 years and has two children. She is currently working at [employer]. ALLERGIES:  She claimed to have allergies to aspirin and iron. Review of Systems: \"Per interval history; otherwise 10 point ROS is negative. \"   Her energy level is fair, appetite, and sleep are pretty good. She denies fever, chills, night sweats, cough, shortness of breath, chest pain, hemoptysis, or palpitations. Her bowel and bladder functions are normal. She denies nausea, vomiting, abdominal pain, diarrhea, constipation, dysuria, loss of appetite, or weight loss. She doesn't have neuropathy and she denies bleeding or clotting issues. She doesn't have any pain in her body. Denies anxiety or depression.  The rest of the systems are unremarkable. Vital Signs:  /63 (Position: Sitting)   Pulse 65   Temp 96.8 °F (36 °C) (Temporal)   Ht 5' 6\" (1.676 m)   Wt 230 lb (104.3 kg)   SpO2 96%   BMI 37.12 kg/m²     Physical Exam:  CONSTITUTIONAL: awake, alert, cooperative, no apparent distress   EYES: pupils equal, round and reactive to light, sclera clear and conjunctiva normal  ENT: Normocephalic, without obvious abnormality, atraumatic  NECK: supple, symmetrical, no jugular venous distension and no carotid bruits   HEMATOLOGIC/LYMPHATIC: no cervical, supraclavicular or axillary lymphadenopathy   LUNGS: VBS, no crackles, clear to auscultation, no increased work of breathing, no wheezes, no rhonchi,    CARDIOVASCULAR: regular rate and rhythm, normal S1 and S2, no murmur noted  ABDOMEN: normal bowel sounds x 4, non-tender, no masses palpated, no hepatosplenomegaly,  soft, non-distended,   MUSCULOSKELETAL: full range of motion noted, tone is normal  NEUROLOGIC: awake, alert, oriented to name, place and time. Motor skills grossly intact. SKIN: Normal skin color, texture, turgor and no jaundice.  appears intact   EXTREMITIES: no cyanosis, no clubbing, no LE edema, no leg swelling,       Labs:  Hematology:  Lab Results   Component Value Date    WBC 6.9 07/21/2021    RBC 4.16 (L) 07/21/2021    HGB 12.0 (L) 07/21/2021    HCT 36.9 (L) 07/21/2021    MCV 88.7 07/21/2021    MCH 28.8 07/21/2021    MCHC 32.5 07/21/2021    RDW 16.3 (H) 07/21/2021     (L) 07/21/2021    MPV 11.2 (H) 07/21/2021    BANDSPCT 5 03/08/2017    SEGSPCT 85.2 (H) 07/21/2021    EOSRELPCT 0.4 07/21/2021    BASOPCT 0.0 07/21/2021    LYMPHOPCT 11.2 (L) 07/21/2021    MONOPCT 3.2 07/21/2021    BANDABS 0.12 03/08/2017    SEGSABS 5.8 07/21/2021    EOSABS 0.0 07/21/2021    BASOSABS 0.0 07/21/2021    LYMPHSABS 0.8 07/21/2021    MONOSABS 0.2 07/21/2021    DIFFTYPE AUTOMATED DIFFERENTIAL 07/21/2021     Lab Results   Component Value Date    ESR 28 06/09/2021 Chemistry:  Lab Results   Component Value Date     07/14/2021    K 4.6 (H) 07/14/2021     07/14/2021    CO2 25 07/14/2021    BUN 24 (H) 07/14/2021    CREATININE 1.2 (H) 07/14/2021    GLUCOSE 103 (H) 07/14/2021    CALCIUM 9.6 07/14/2021    PROT 7.2 07/14/2021    LABALBU 4.0 07/14/2021    BILITOT 0.6 07/14/2021    ALKPHOS 78 07/14/2021    AST 15 07/14/2021    ALT 16 07/14/2021    LABGLOM 45 (L) 07/14/2021    GFRAA 54 (L) 07/14/2021    POCCA 1.13 07/14/2021    POCGLU 114 (H) 07/14/2021     Lab Results   Component Value Date     06/09/2021     No components found for: LD  Lab Results   Component Value Date    TSHHS 2.340 10/31/2018     Immunology:  Lab Results   Component Value Date    PROT 7.2 07/14/2021    SPEP  06/09/2021     INTERPRETATION - A faint possible IgA lambda monoclonal band is seen. SAF    SPEP  06/09/2021     INTERPRETATION - A faint possible IgA lambda monoclonal band is identified on concuttent immunofixation, which is too small to measure.  SAF    ALBUMINELP 3.2 06/09/2021    LABALPH 0.2 06/09/2021    LABALPH 0.7 06/09/2021    LABBETA 1.2 06/09/2021    GAMGLOB 1.2 06/09/2021     Lab Results   Component Value Date    KAPPAUVOL 27.18 (H) 06/09/2021    LAMBDAUVOL 67.21 (H) 11/11/2020    KLFLCR 0.22 (L) 06/09/2021     Lab Results   Component Value Date    B2M 3.8 (H) 06/09/2021     Coagulation Panel:  Lab Results   Component Value Date    PROTIME 12.8 (H) 10/31/2018    INR 1.12 10/31/2018    APTT 29.7 10/31/2018    DDIMER <200 05/17/2017     Anemia Panel:  Lab Results   Component Value Date    DWGSCHOV24 228.6 05/17/2017    FOLATE >20.0 (H) 05/17/2017     Tumor Markers:  No results found for: , CEA, , LABCA2, PSA  Observations:  No data recorded        Assessment & Plan:   Right tibial intramedullary lesion and upper sternum lesion - with multiple lytic bone lesions - biopsy is consistent with multiple myeloma - s/p chemotherapy with Velcade, Revlimid and Dexamentasone - currently on maintenance Melphalan and prednisone. PLAN:  Ms. Marline Anand has been followed for multiple myeloma. Since she is found to have recurrent multiple myeloma, we started chemotherapy with Velcade, Revlimid and dexamethasone since July 7, 2021. Zoledronic acid was started on July 7, 2021 and we will continue to give it every 12 week intervals. On July 21, 2021, she presented to me for followup. I have been following Ms. Marline Anand for multiple myeloma and she is currently on close observation. She was on maintenance chemotherapy until May 7, 2017, and we stopped it after that since she could not tolerate maintenance chemotherapy because of significant myelosuppression. She has been under close observation since then. She stated that she noticed increase in size of her upper sternum lesion. It was decreased and back to normal size after chemo in 2016. I recognize that her serum lambda light chain has gradually increased over time. In view of her enlarging upper sternum lesion along with increasing lambda light chain, I believe she has recurrent multiple myeloma. I offered her to have repeat scans and bone marrow biopsy to confirm progression of the disease. However, she does not want to have repeat scans because it exacerbates her vitiligo. She also does not want to have biopsy at this moment. However, she agrees to start chemotherapy with Velcade, Revlimid and dexamethasone. Since her laboratory work-ups and clinical findings are consistent with recurrent multiple myeloma, we started first-line chemotherapy on July 7, 2021. Since she has been in remission for more than 4 years, we decided to rechallenge with velcade, revlimid and dexamethason. She is tolerating current chemotherapy very well and she does not encounter any major side effects from it. I recognize that her upper sternal mass is decreasing in size and I believe she is achieving response to current chemotherapy. I will repeat multiple myeloma panel again on July 28, 2021 after first cycle of chemotherapy. Revlimid induced thrombosis prophylaxis - I recommend her to continue with aspirin 81 mg daily. Herpes zoster prophylaxis - I recommend her to continue with acyclovir 400 mg twice daily. I also recommend to continue with zoledronic acid every twelve month intervals and her next treatment will be on September 29, 2021. For her hypertension, I recommend to continue with metoprolol 50 mg daily for now. Her blood pressure reading on today is normal. I also recommend her to continue with vitamin B12 supplementation. Health maintenance - I asked her to have age-appropriate cancer screening, exercise, low-fat and low-sodium diet. She said she is not ready to take covid vaccine at this moment. I answered all her questions and concerns for today. I asked her to follow up with primary care physician on regular basis. Recent imaging and labs were reviewed and discussed with the patient.

## 2021-07-20 DIAGNOSIS — C90.00 MULTIPLE MYELOMA NOT HAVING ACHIEVED REMISSION (HCC): Primary | ICD-10-CM

## 2021-07-21 ENCOUNTER — HOSPITAL ENCOUNTER (OUTPATIENT)
Dept: INFUSION THERAPY | Age: 71
Discharge: HOME | End: 2021-07-21
Payer: MEDICARE

## 2021-07-21 ENCOUNTER — OFFICE VISIT (OUTPATIENT)
Dept: ONCOLOGY | Age: 71
End: 2021-07-21
Payer: MEDICARE

## 2021-07-21 VITALS
HEART RATE: 65 BPM | TEMPERATURE: 96.8 F | HEIGHT: 66 IN | SYSTOLIC BLOOD PRESSURE: 136 MMHG | DIASTOLIC BLOOD PRESSURE: 63 MMHG | OXYGEN SATURATION: 96 % | BODY MASS INDEX: 36.96 KG/M2 | WEIGHT: 230 LBS

## 2021-07-21 VITALS
TEMPERATURE: 96.8 F | WEIGHT: 230 LBS | HEART RATE: 65 BPM | BODY MASS INDEX: 36.96 KG/M2 | SYSTOLIC BLOOD PRESSURE: 136 MMHG | OXYGEN SATURATION: 96 % | HEIGHT: 66 IN | DIASTOLIC BLOOD PRESSURE: 63 MMHG

## 2021-07-21 DIAGNOSIS — C90.00 MULTIPLE MYELOMA NOT HAVING ACHIEVED REMISSION (HCC): Primary | ICD-10-CM

## 2021-07-21 LAB
ALBUMIN SERPL-MCNC: 4.2 GM/DL (ref 3.4–5)
ALP BLD-CCNC: 103 IU/L (ref 40–129)
ALT SERPL-CCNC: 22 U/L (ref 10–40)
ANION GAP SERPL CALCULATED.3IONS-SCNC: 11 MMOL/L (ref 4–16)
AST SERPL-CCNC: 17 IU/L (ref 15–37)
BASOPHILS ABSOLUTE: 0 K/CU MM
BASOPHILS RELATIVE PERCENT: 0 % (ref 0–1)
BILIRUB SERPL-MCNC: 0.7 MG/DL (ref 0–1)
BUN BLDV-MCNC: 24 MG/DL (ref 6–23)
CALCIUM SERPL-MCNC: 8.9 MG/DL (ref 8.3–10.6)
CHLORIDE BLD-SCNC: 99 MMOL/L (ref 99–110)
CO2: 27 MMOL/L (ref 21–32)
CREAT SERPL-MCNC: 1.2 MG/DL (ref 0.6–1.1)
DIFFERENTIAL TYPE: ABNORMAL
EOSINOPHILS ABSOLUTE: 0 K/CU MM
EOSINOPHILS RELATIVE PERCENT: 0.4 % (ref 0–3)
GFR AFRICAN AMERICAN: 54 ML/MIN/1.73M2
GFR NON-AFRICAN AMERICAN: 45 ML/MIN/1.73M2
GLUCOSE BLD-MCNC: 108 MG/DL (ref 70–99)
HCT VFR BLD CALC: 36.9 % (ref 37–47)
HEMOGLOBIN: 12 GM/DL (ref 12.5–16)
LYMPHOCYTES ABSOLUTE: 0.8 K/CU MM
LYMPHOCYTES RELATIVE PERCENT: 11.2 % (ref 24–44)
MCH RBC QN AUTO: 28.8 PG (ref 27–31)
MCHC RBC AUTO-ENTMCNC: 32.5 % (ref 32–36)
MCV RBC AUTO: 88.7 FL (ref 78–100)
MONOCYTES ABSOLUTE: 0.2 K/CU MM
MONOCYTES RELATIVE PERCENT: 3.2 % (ref 0–4)
PDW BLD-RTO: 16.3 % (ref 11.7–14.9)
PLATELET # BLD: 133 K/CU MM (ref 140–440)
PMV BLD AUTO: 11.2 FL (ref 7.5–11.1)
POTASSIUM SERPL-SCNC: 4.6 MMOL/L (ref 3.5–5.1)
RBC # BLD: 4.16 M/CU MM (ref 4.2–5.4)
SEGMENTED NEUTROPHILS ABSOLUTE COUNT: 5.8 K/CU MM
SEGMENTED NEUTROPHILS RELATIVE PERCENT: 85.2 % (ref 36–66)
SODIUM BLD-SCNC: 137 MMOL/L (ref 135–145)
TOTAL PROTEIN: 6.9 GM/DL (ref 6.4–8.2)
WBC # BLD: 6.9 K/CU MM (ref 4–10.5)

## 2021-07-21 PROCEDURE — 6360000002 HC RX W HCPCS: Performed by: INTERNAL MEDICINE

## 2021-07-21 PROCEDURE — 3017F COLORECTAL CA SCREEN DOC REV: CPT | Performed by: INTERNAL MEDICINE

## 2021-07-21 PROCEDURE — G8400 PT W/DXA NO RESULTS DOC: HCPCS | Performed by: INTERNAL MEDICINE

## 2021-07-21 PROCEDURE — 1123F ACP DISCUSS/DSCN MKR DOCD: CPT | Performed by: INTERNAL MEDICINE

## 2021-07-21 PROCEDURE — 85025 COMPLETE CBC W/AUTO DIFF WBC: CPT

## 2021-07-21 PROCEDURE — 80053 COMPREHEN METABOLIC PANEL: CPT

## 2021-07-21 PROCEDURE — 96401 CHEMO ANTI-NEOPL SQ/IM: CPT

## 2021-07-21 PROCEDURE — 36415 COLL VENOUS BLD VENIPUNCTURE: CPT

## 2021-07-21 PROCEDURE — 4040F PNEUMOC VAC/ADMIN/RCVD: CPT | Performed by: INTERNAL MEDICINE

## 2021-07-21 PROCEDURE — 1036F TOBACCO NON-USER: CPT | Performed by: INTERNAL MEDICINE

## 2021-07-21 PROCEDURE — G8428 CUR MEDS NOT DOCUMENT: HCPCS | Performed by: INTERNAL MEDICINE

## 2021-07-21 PROCEDURE — G8417 CALC BMI ABV UP PARAM F/U: HCPCS | Performed by: INTERNAL MEDICINE

## 2021-07-21 PROCEDURE — 2580000003 HC RX 258: Performed by: INTERNAL MEDICINE

## 2021-07-21 PROCEDURE — 1090F PRES/ABSN URINE INCON ASSESS: CPT | Performed by: INTERNAL MEDICINE

## 2021-07-21 PROCEDURE — 99214 OFFICE O/P EST MOD 30 MIN: CPT | Performed by: INTERNAL MEDICINE

## 2021-07-21 RX ORDER — METOPROLOL SUCCINATE 50 MG/1
TABLET, EXTENDED RELEASE ORAL
Qty: 90 TABLET | Refills: 3 | Status: SHIPPED | OUTPATIENT
Start: 2021-07-21 | End: 2021-09-08 | Stop reason: DRUGHIGH

## 2021-07-21 RX ADMIN — BORTEZOMIB 2.88 MG: 3.5 INJECTION, POWDER, LYOPHILIZED, FOR SOLUTION INTRAVENOUS; SUBCUTANEOUS at 11:53

## 2021-07-21 NOTE — PROGRESS NOTES
Here for Velcade. Voiced no complaints or needs at this time. CBCD reviewed with patient. WDL for treatment. Injection administered as ordered. AVS given to patient. Discharged in stable condition.

## 2021-07-21 NOTE — PROGRESS NOTES
MA Rooming Questions  Patient: Calos Mccabe  MRN: [id number]    Date: 7/21/2021        1. Do you have any new issues?   no         2. Do you need any refills on medications? yes - Metoprolol    3. Have you had any imaging done since your last visit?   no    4. Have you been hospitalized or seen in the emergency room since your last visit here?   no    5. Did the patient have a depression screening completed today?  No    No data recorded     PHQ-9 Given to (if applicable):               PHQ-9 Score (if applicable):                     [] Positive     []  Negative              Does question #9 need addressed (if applicable)                     [] Yes    []  No               Dana Sanabria CMA

## 2021-07-26 DIAGNOSIS — C90.00 MULTIPLE MYELOMA NOT HAVING ACHIEVED REMISSION (HCC): Primary | ICD-10-CM

## 2021-07-28 ENCOUNTER — HOSPITAL ENCOUNTER (OUTPATIENT)
Dept: INFUSION THERAPY | Age: 71
Discharge: HOME | End: 2021-07-28
Payer: MEDICARE

## 2021-07-28 VITALS
WEIGHT: 237.6 LBS | TEMPERATURE: 97.3 F | DIASTOLIC BLOOD PRESSURE: 76 MMHG | OXYGEN SATURATION: 96 % | HEIGHT: 66 IN | SYSTOLIC BLOOD PRESSURE: 154 MMHG | HEART RATE: 67 BPM | BODY MASS INDEX: 38.18 KG/M2

## 2021-07-28 DIAGNOSIS — C90.00 MULTIPLE MYELOMA NOT HAVING ACHIEVED REMISSION (HCC): Primary | ICD-10-CM

## 2021-07-28 LAB
ALBUMIN SERPL-MCNC: 4.1 GM/DL (ref 3.4–5)
ALP BLD-CCNC: 83 IU/L (ref 40–129)
ALT SERPL-CCNC: 23 U/L (ref 10–40)
ANION GAP SERPL CALCULATED.3IONS-SCNC: 9 MMOL/L (ref 4–16)
AST SERPL-CCNC: 14 IU/L (ref 15–37)
BASOPHILS ABSOLUTE: 0 K/CU MM
BASOPHILS RELATIVE PERCENT: 0.2 % (ref 0–1)
BILIRUB SERPL-MCNC: 0.2 MG/DL (ref 0–1)
BUN BLDV-MCNC: 30 MG/DL (ref 6–23)
CALCIUM SERPL-MCNC: 8.7 MG/DL (ref 8.3–10.6)
CHLORIDE BLD-SCNC: 106 MMOL/L (ref 99–110)
CO2: 24 MMOL/L (ref 21–32)
CREAT SERPL-MCNC: 0.9 MG/DL (ref 0.6–1.1)
DIFFERENTIAL TYPE: ABNORMAL
EOSINOPHILS ABSOLUTE: 0 K/CU MM
EOSINOPHILS RELATIVE PERCENT: 0.7 % (ref 0–3)
GFR AFRICAN AMERICAN: >60 ML/MIN/1.73M2
GFR NON-AFRICAN AMERICAN: >60 ML/MIN/1.73M2
GLUCOSE BLD-MCNC: 98 MG/DL (ref 70–99)
HCT VFR BLD CALC: 34.1 % (ref 37–47)
HEMOGLOBIN: 11 GM/DL (ref 12.5–16)
IGA: 220 MG/DL (ref 69–382)
IGG,SERUM: 1017 MG/DL (ref 723–1685)
IGM,SERUM: 46 MG/DL (ref 62–277)
LYMPHOCYTES ABSOLUTE: 0.8 K/CU MM
LYMPHOCYTES RELATIVE PERCENT: 15.3 % (ref 24–44)
MCH RBC QN AUTO: 29.1 PG (ref 27–31)
MCHC RBC AUTO-ENTMCNC: 32.3 % (ref 32–36)
MCV RBC AUTO: 90.2 FL (ref 78–100)
MONOCYTES ABSOLUTE: 0.2 K/CU MM
MONOCYTES RELATIVE PERCENT: 4.5 % (ref 0–4)
PDW BLD-RTO: 16.4 % (ref 11.7–14.9)
PLATELET # BLD: 179 K/CU MM (ref 140–440)
PMV BLD AUTO: 9.2 FL (ref 7.5–11.1)
POTASSIUM SERPL-SCNC: 5.1 MMOL/L (ref 3.5–5.1)
RBC # BLD: 3.78 M/CU MM (ref 4.2–5.4)
SEGMENTED NEUTROPHILS ABSOLUTE COUNT: 4.3 K/CU MM
SEGMENTED NEUTROPHILS RELATIVE PERCENT: 79.3 % (ref 36–66)
SODIUM BLD-SCNC: 139 MMOL/L (ref 135–145)
TOTAL PROTEIN: 6.4 GM/DL (ref 6.4–8.2)
WBC # BLD: 5.4 K/CU MM (ref 4–10.5)

## 2021-07-28 PROCEDURE — 96401 CHEMO ANTI-NEOPL SQ/IM: CPT

## 2021-07-28 PROCEDURE — 82784 ASSAY IGA/IGD/IGG/IGM EACH: CPT

## 2021-07-28 PROCEDURE — 82232 ASSAY OF BETA-2 PROTEIN: CPT

## 2021-07-28 PROCEDURE — 2580000003 HC RX 258: Performed by: INTERNAL MEDICINE

## 2021-07-28 PROCEDURE — 85025 COMPLETE CBC W/AUTO DIFF WBC: CPT

## 2021-07-28 PROCEDURE — 84165 PROTEIN E-PHORESIS SERUM: CPT

## 2021-07-28 PROCEDURE — 83883 ASSAY NEPHELOMETRY NOT SPEC: CPT

## 2021-07-28 PROCEDURE — 36415 COLL VENOUS BLD VENIPUNCTURE: CPT

## 2021-07-28 PROCEDURE — 6360000002 HC RX W HCPCS: Performed by: INTERNAL MEDICINE

## 2021-07-28 PROCEDURE — 80053 COMPREHEN METABOLIC PANEL: CPT

## 2021-07-28 PROCEDURE — 86320 SERUM IMMUNOELECTROPHORESIS: CPT

## 2021-07-28 RX ORDER — SODIUM CHLORIDE 0.9 % (FLUSH) 0.9 %
5-40 SYRINGE (ML) INJECTION PRN
Status: CANCELLED | OUTPATIENT
Start: 2021-09-01

## 2021-07-28 RX ORDER — DIPHENHYDRAMINE HYDROCHLORIDE 50 MG/ML
50 INJECTION INTRAMUSCULAR; INTRAVENOUS ONCE
Status: CANCELLED | OUTPATIENT
Start: 2021-09-01 | End: 2021-09-01

## 2021-07-28 RX ORDER — EPINEPHRINE 1 MG/ML
0.3 INJECTION, SOLUTION, CONCENTRATE INTRAVENOUS PRN
Status: CANCELLED | OUTPATIENT
Start: 2021-10-20

## 2021-07-28 RX ORDER — EPINEPHRINE 1 MG/ML
0.3 INJECTION, SOLUTION, CONCENTRATE INTRAVENOUS PRN
Status: CANCELLED | OUTPATIENT
Start: 2021-07-28

## 2021-07-28 RX ORDER — SODIUM CHLORIDE 0.9 % (FLUSH) 0.9 %
5-40 SYRINGE (ML) INJECTION PRN
Status: CANCELLED | OUTPATIENT
Start: 2021-10-20

## 2021-07-28 RX ORDER — EPINEPHRINE 1 MG/ML
0.3 INJECTION, SOLUTION, CONCENTRATE INTRAVENOUS PRN
Status: CANCELLED | OUTPATIENT
Start: 2021-08-18

## 2021-07-28 RX ORDER — SODIUM CHLORIDE 9 MG/ML
INJECTION, SOLUTION INTRAVENOUS CONTINUOUS
Status: CANCELLED | OUTPATIENT
Start: 2021-08-25

## 2021-07-28 RX ORDER — HEPARIN SODIUM (PORCINE) LOCK FLUSH IV SOLN 100 UNIT/ML 100 UNIT/ML
500 SOLUTION INTRAVENOUS PRN
Status: CANCELLED | OUTPATIENT
Start: 2021-10-20

## 2021-07-28 RX ORDER — HEPARIN SODIUM (PORCINE) LOCK FLUSH IV SOLN 100 UNIT/ML 100 UNIT/ML
500 SOLUTION INTRAVENOUS PRN
Status: CANCELLED | OUTPATIENT
Start: 2021-08-25

## 2021-07-28 RX ORDER — EPINEPHRINE 1 MG/ML
0.3 INJECTION, SOLUTION, CONCENTRATE INTRAVENOUS PRN
Status: CANCELLED | OUTPATIENT
Start: 2021-09-08

## 2021-07-28 RX ORDER — SODIUM CHLORIDE 9 MG/ML
INJECTION, SOLUTION INTRAVENOUS CONTINUOUS
Status: CANCELLED | OUTPATIENT
Start: 2021-09-29

## 2021-07-28 RX ORDER — SODIUM CHLORIDE 0.9 % (FLUSH) 0.9 %
5-40 SYRINGE (ML) INJECTION PRN
Status: CANCELLED | OUTPATIENT
Start: 2021-08-11

## 2021-07-28 RX ORDER — SODIUM CHLORIDE 9 MG/ML
25 INJECTION, SOLUTION INTRAVENOUS PRN
Status: CANCELLED | OUTPATIENT
Start: 2021-08-25

## 2021-07-28 RX ORDER — SODIUM CHLORIDE 9 MG/ML
20 INJECTION, SOLUTION INTRAVENOUS ONCE
Status: CANCELLED | OUTPATIENT
Start: 2021-09-08 | End: 2021-09-08

## 2021-07-28 RX ORDER — SODIUM CHLORIDE 0.9 % (FLUSH) 0.9 %
5-40 SYRINGE (ML) INJECTION PRN
Status: CANCELLED | OUTPATIENT
Start: 2021-09-29

## 2021-07-28 RX ORDER — SODIUM CHLORIDE 9 MG/ML
25 INJECTION, SOLUTION INTRAVENOUS PRN
Status: CANCELLED | OUTPATIENT
Start: 2021-08-11

## 2021-07-28 RX ORDER — SODIUM CHLORIDE 9 MG/ML
25 INJECTION, SOLUTION INTRAVENOUS PRN
Status: CANCELLED | OUTPATIENT
Start: 2021-09-08

## 2021-07-28 RX ORDER — SODIUM CHLORIDE 9 MG/ML
25 INJECTION, SOLUTION INTRAVENOUS PRN
Status: CANCELLED | OUTPATIENT
Start: 2021-07-28

## 2021-07-28 RX ORDER — SODIUM CHLORIDE 9 MG/ML
20 INJECTION, SOLUTION INTRAVENOUS ONCE
Status: CANCELLED | OUTPATIENT
Start: 2021-08-25 | End: 2021-08-25

## 2021-07-28 RX ORDER — SODIUM CHLORIDE 0.9 % (FLUSH) 0.9 %
5-40 SYRINGE (ML) INJECTION PRN
Status: CANCELLED | OUTPATIENT
Start: 2021-09-22

## 2021-07-28 RX ORDER — METHYLPREDNISOLONE SODIUM SUCCINATE 125 MG/2ML
125 INJECTION, POWDER, LYOPHILIZED, FOR SOLUTION INTRAMUSCULAR; INTRAVENOUS ONCE
Status: CANCELLED | OUTPATIENT
Start: 2021-10-06 | End: 2021-09-29

## 2021-07-28 RX ORDER — DIPHENHYDRAMINE HYDROCHLORIDE 50 MG/ML
50 INJECTION INTRAMUSCULAR; INTRAVENOUS ONCE
Status: CANCELLED | OUTPATIENT
Start: 2021-09-22 | End: 2021-09-15

## 2021-07-28 RX ORDER — HEPARIN SODIUM (PORCINE) LOCK FLUSH IV SOLN 100 UNIT/ML 100 UNIT/ML
500 SOLUTION INTRAVENOUS PRN
Status: CANCELLED | OUTPATIENT
Start: 2021-10-13

## 2021-07-28 RX ORDER — SODIUM CHLORIDE 0.9 % (FLUSH) 0.9 %
5-40 SYRINGE (ML) INJECTION PRN
Status: CANCELLED | OUTPATIENT
Start: 2021-07-28

## 2021-07-28 RX ORDER — SODIUM CHLORIDE 0.9 % (FLUSH) 0.9 %
5-40 SYRINGE (ML) INJECTION PRN
Status: CANCELLED | OUTPATIENT
Start: 2021-10-06

## 2021-07-28 RX ORDER — HEPARIN SODIUM (PORCINE) LOCK FLUSH IV SOLN 100 UNIT/ML 100 UNIT/ML
500 SOLUTION INTRAVENOUS PRN
Status: CANCELLED | OUTPATIENT
Start: 2021-09-08

## 2021-07-28 RX ORDER — EPINEPHRINE 1 MG/ML
0.3 INJECTION, SOLUTION, CONCENTRATE INTRAVENOUS PRN
Status: CANCELLED | OUTPATIENT
Start: 2021-08-11

## 2021-07-28 RX ORDER — EPINEPHRINE 1 MG/ML
0.3 INJECTION, SOLUTION, CONCENTRATE INTRAVENOUS PRN
Status: CANCELLED | OUTPATIENT
Start: 2021-09-29

## 2021-07-28 RX ORDER — SODIUM CHLORIDE 9 MG/ML
20 INJECTION, SOLUTION INTRAVENOUS ONCE
Status: CANCELLED | OUTPATIENT
Start: 2021-09-22 | End: 2021-09-15

## 2021-07-28 RX ORDER — DIPHENHYDRAMINE HYDROCHLORIDE 50 MG/ML
50 INJECTION INTRAMUSCULAR; INTRAVENOUS ONCE
Status: CANCELLED | OUTPATIENT
Start: 2021-09-08 | End: 2021-09-08

## 2021-07-28 RX ORDER — METHYLPREDNISOLONE SODIUM SUCCINATE 125 MG/2ML
125 INJECTION, POWDER, LYOPHILIZED, FOR SOLUTION INTRAMUSCULAR; INTRAVENOUS ONCE
Status: CANCELLED | OUTPATIENT
Start: 2021-08-25 | End: 2021-08-25

## 2021-07-28 RX ORDER — SODIUM CHLORIDE 9 MG/ML
20 INJECTION, SOLUTION INTRAVENOUS ONCE
Status: CANCELLED | OUTPATIENT
Start: 2021-08-04 | End: 2021-08-04

## 2021-07-28 RX ORDER — SODIUM CHLORIDE 0.9 % (FLUSH) 0.9 %
5-40 SYRINGE (ML) INJECTION PRN
Status: CANCELLED | OUTPATIENT
Start: 2021-08-04

## 2021-07-28 RX ORDER — SODIUM CHLORIDE 9 MG/ML
20 INJECTION, SOLUTION INTRAVENOUS ONCE
Status: CANCELLED | OUTPATIENT
Start: 2021-10-06 | End: 2021-09-29

## 2021-07-28 RX ORDER — METHYLPREDNISOLONE SODIUM SUCCINATE 125 MG/2ML
125 INJECTION, POWDER, LYOPHILIZED, FOR SOLUTION INTRAMUSCULAR; INTRAVENOUS ONCE
Status: CANCELLED | OUTPATIENT
Start: 2021-09-22 | End: 2021-09-15

## 2021-07-28 RX ORDER — SODIUM CHLORIDE 9 MG/ML
INJECTION, SOLUTION INTRAVENOUS CONTINUOUS
Status: CANCELLED | OUTPATIENT
Start: 2021-08-18

## 2021-07-28 RX ORDER — SODIUM CHLORIDE 9 MG/ML
25 INJECTION, SOLUTION INTRAVENOUS PRN
Status: CANCELLED | OUTPATIENT
Start: 2021-09-22

## 2021-07-28 RX ORDER — SODIUM CHLORIDE 0.9 % (FLUSH) 0.9 %
5-40 SYRINGE (ML) INJECTION PRN
Status: CANCELLED | OUTPATIENT
Start: 2021-08-18

## 2021-07-28 RX ORDER — METHYLPREDNISOLONE SODIUM SUCCINATE 125 MG/2ML
125 INJECTION, POWDER, LYOPHILIZED, FOR SOLUTION INTRAMUSCULAR; INTRAVENOUS ONCE
Status: CANCELLED | OUTPATIENT
Start: 2021-08-18 | End: 2021-08-18

## 2021-07-28 RX ORDER — SODIUM CHLORIDE 9 MG/ML
INJECTION, SOLUTION INTRAVENOUS CONTINUOUS
Status: CANCELLED | OUTPATIENT
Start: 2021-07-28

## 2021-07-28 RX ORDER — SODIUM CHLORIDE 9 MG/ML
INJECTION, SOLUTION INTRAVENOUS CONTINUOUS
Status: CANCELLED | OUTPATIENT
Start: 2021-10-13

## 2021-07-28 RX ORDER — SODIUM CHLORIDE 0.9 % (FLUSH) 0.9 %
5-40 SYRINGE (ML) INJECTION PRN
Status: CANCELLED | OUTPATIENT
Start: 2021-09-08

## 2021-07-28 RX ORDER — METHYLPREDNISOLONE SODIUM SUCCINATE 125 MG/2ML
125 INJECTION, POWDER, LYOPHILIZED, FOR SOLUTION INTRAMUSCULAR; INTRAVENOUS ONCE
Status: CANCELLED | OUTPATIENT
Start: 2021-08-04 | End: 2021-08-04

## 2021-07-28 RX ORDER — HEPARIN SODIUM (PORCINE) LOCK FLUSH IV SOLN 100 UNIT/ML 100 UNIT/ML
500 SOLUTION INTRAVENOUS PRN
Status: CANCELLED | OUTPATIENT
Start: 2021-09-29

## 2021-07-28 RX ORDER — METHYLPREDNISOLONE SODIUM SUCCINATE 125 MG/2ML
125 INJECTION, POWDER, LYOPHILIZED, FOR SOLUTION INTRAMUSCULAR; INTRAVENOUS ONCE
Status: CANCELLED | OUTPATIENT
Start: 2021-08-11 | End: 2021-08-11

## 2021-07-28 RX ORDER — EPINEPHRINE 1 MG/ML
0.3 INJECTION, SOLUTION, CONCENTRATE INTRAVENOUS PRN
Status: CANCELLED | OUTPATIENT
Start: 2021-10-06

## 2021-07-28 RX ORDER — HEPARIN SODIUM (PORCINE) LOCK FLUSH IV SOLN 100 UNIT/ML 100 UNIT/ML
500 SOLUTION INTRAVENOUS PRN
Status: CANCELLED | OUTPATIENT
Start: 2021-08-04

## 2021-07-28 RX ORDER — HEPARIN SODIUM (PORCINE) LOCK FLUSH IV SOLN 100 UNIT/ML 100 UNIT/ML
500 SOLUTION INTRAVENOUS PRN
Status: CANCELLED | OUTPATIENT
Start: 2021-08-11

## 2021-07-28 RX ORDER — SODIUM CHLORIDE 9 MG/ML
25 INJECTION, SOLUTION INTRAVENOUS PRN
Status: CANCELLED | OUTPATIENT
Start: 2021-09-01

## 2021-07-28 RX ORDER — METHYLPREDNISOLONE SODIUM SUCCINATE 125 MG/2ML
125 INJECTION, POWDER, LYOPHILIZED, FOR SOLUTION INTRAMUSCULAR; INTRAVENOUS ONCE
Status: CANCELLED | OUTPATIENT
Start: 2021-07-28 | End: 2021-07-28

## 2021-07-28 RX ORDER — SODIUM CHLORIDE 9 MG/ML
25 INJECTION, SOLUTION INTRAVENOUS PRN
Status: CANCELLED | OUTPATIENT
Start: 2021-10-06

## 2021-07-28 RX ORDER — EPINEPHRINE 1 MG/ML
0.3 INJECTION, SOLUTION, CONCENTRATE INTRAVENOUS PRN
Status: CANCELLED | OUTPATIENT
Start: 2021-09-01

## 2021-07-28 RX ORDER — SODIUM CHLORIDE 9 MG/ML
25 INJECTION, SOLUTION INTRAVENOUS PRN
Status: CANCELLED | OUTPATIENT
Start: 2021-08-04

## 2021-07-28 RX ORDER — HEPARIN SODIUM (PORCINE) LOCK FLUSH IV SOLN 100 UNIT/ML 100 UNIT/ML
500 SOLUTION INTRAVENOUS PRN
Status: CANCELLED | OUTPATIENT
Start: 2021-07-28

## 2021-07-28 RX ORDER — METHYLPREDNISOLONE SODIUM SUCCINATE 125 MG/2ML
125 INJECTION, POWDER, LYOPHILIZED, FOR SOLUTION INTRAMUSCULAR; INTRAVENOUS ONCE
Status: CANCELLED | OUTPATIENT
Start: 2021-10-13 | End: 2021-10-06

## 2021-07-28 RX ORDER — SODIUM CHLORIDE 9 MG/ML
20 INJECTION, SOLUTION INTRAVENOUS ONCE
Status: CANCELLED | OUTPATIENT
Start: 2021-07-28 | End: 2021-07-28

## 2021-07-28 RX ORDER — SODIUM CHLORIDE 9 MG/ML
20 INJECTION, SOLUTION INTRAVENOUS ONCE
Status: CANCELLED | OUTPATIENT
Start: 2021-10-13 | End: 2021-10-06

## 2021-07-28 RX ORDER — DIPHENHYDRAMINE HYDROCHLORIDE 50 MG/ML
50 INJECTION INTRAMUSCULAR; INTRAVENOUS ONCE
Status: CANCELLED | OUTPATIENT
Start: 2021-08-18 | End: 2021-08-18

## 2021-07-28 RX ORDER — EPINEPHRINE 1 MG/ML
0.3 INJECTION, SOLUTION, CONCENTRATE INTRAVENOUS PRN
Status: CANCELLED | OUTPATIENT
Start: 2021-09-22

## 2021-07-28 RX ORDER — SODIUM CHLORIDE 9 MG/ML
INJECTION, SOLUTION INTRAVENOUS CONTINUOUS
Status: CANCELLED | OUTPATIENT
Start: 2021-09-22

## 2021-07-28 RX ORDER — EPINEPHRINE 1 MG/ML
0.3 INJECTION, SOLUTION, CONCENTRATE INTRAVENOUS PRN
Status: CANCELLED | OUTPATIENT
Start: 2021-08-04

## 2021-07-28 RX ORDER — EPINEPHRINE 1 MG/ML
0.3 INJECTION, SOLUTION, CONCENTRATE INTRAVENOUS PRN
Status: CANCELLED | OUTPATIENT
Start: 2021-08-25

## 2021-07-28 RX ORDER — DIPHENHYDRAMINE HYDROCHLORIDE 50 MG/ML
50 INJECTION INTRAMUSCULAR; INTRAVENOUS ONCE
Status: CANCELLED | OUTPATIENT
Start: 2021-09-29 | End: 2021-09-22

## 2021-07-28 RX ORDER — DIPHENHYDRAMINE HYDROCHLORIDE 50 MG/ML
50 INJECTION INTRAMUSCULAR; INTRAVENOUS ONCE
Status: CANCELLED | OUTPATIENT
Start: 2021-10-13 | End: 2021-10-06

## 2021-07-28 RX ORDER — SODIUM CHLORIDE 9 MG/ML
INJECTION, SOLUTION INTRAVENOUS CONTINUOUS
Status: CANCELLED | OUTPATIENT
Start: 2021-09-01

## 2021-07-28 RX ORDER — HEPARIN SODIUM (PORCINE) LOCK FLUSH IV SOLN 100 UNIT/ML 100 UNIT/ML
500 SOLUTION INTRAVENOUS PRN
Status: CANCELLED | OUTPATIENT
Start: 2021-09-01

## 2021-07-28 RX ORDER — SODIUM CHLORIDE 9 MG/ML
INJECTION, SOLUTION INTRAVENOUS CONTINUOUS
Status: CANCELLED | OUTPATIENT
Start: 2021-08-04

## 2021-07-28 RX ORDER — SODIUM CHLORIDE 9 MG/ML
20 INJECTION, SOLUTION INTRAVENOUS ONCE
Status: CANCELLED | OUTPATIENT
Start: 2021-09-01 | End: 2021-09-01

## 2021-07-28 RX ORDER — METHYLPREDNISOLONE SODIUM SUCCINATE 125 MG/2ML
125 INJECTION, POWDER, LYOPHILIZED, FOR SOLUTION INTRAMUSCULAR; INTRAVENOUS ONCE
Status: CANCELLED | OUTPATIENT
Start: 2021-10-20 | End: 2021-10-13

## 2021-07-28 RX ORDER — SODIUM CHLORIDE 9 MG/ML
20 INJECTION, SOLUTION INTRAVENOUS ONCE
Status: CANCELLED | OUTPATIENT
Start: 2021-09-29 | End: 2021-09-22

## 2021-07-28 RX ORDER — HEPARIN SODIUM (PORCINE) LOCK FLUSH IV SOLN 100 UNIT/ML 100 UNIT/ML
500 SOLUTION INTRAVENOUS PRN
Status: CANCELLED | OUTPATIENT
Start: 2021-10-06

## 2021-07-28 RX ORDER — DIPHENHYDRAMINE HYDROCHLORIDE 50 MG/ML
50 INJECTION INTRAMUSCULAR; INTRAVENOUS ONCE
Status: CANCELLED | OUTPATIENT
Start: 2021-10-20 | End: 2021-10-13

## 2021-07-28 RX ORDER — DIPHENHYDRAMINE HYDROCHLORIDE 50 MG/ML
50 INJECTION INTRAMUSCULAR; INTRAVENOUS ONCE
Status: CANCELLED | OUTPATIENT
Start: 2021-10-06 | End: 2021-09-29

## 2021-07-28 RX ORDER — SODIUM CHLORIDE 9 MG/ML
20 INJECTION, SOLUTION INTRAVENOUS ONCE
Status: CANCELLED | OUTPATIENT
Start: 2021-10-20 | End: 2021-10-13

## 2021-07-28 RX ORDER — SODIUM CHLORIDE 9 MG/ML
25 INJECTION, SOLUTION INTRAVENOUS PRN
Status: CANCELLED | OUTPATIENT
Start: 2021-10-20

## 2021-07-28 RX ORDER — DIPHENHYDRAMINE HYDROCHLORIDE 50 MG/ML
50 INJECTION INTRAMUSCULAR; INTRAVENOUS ONCE
Status: CANCELLED | OUTPATIENT
Start: 2021-08-25 | End: 2021-08-25

## 2021-07-28 RX ORDER — SODIUM CHLORIDE 9 MG/ML
INJECTION, SOLUTION INTRAVENOUS CONTINUOUS
Status: CANCELLED | OUTPATIENT
Start: 2021-09-08

## 2021-07-28 RX ORDER — SODIUM CHLORIDE 9 MG/ML
INJECTION, SOLUTION INTRAVENOUS CONTINUOUS
Status: CANCELLED | OUTPATIENT
Start: 2021-08-11

## 2021-07-28 RX ORDER — SODIUM CHLORIDE 0.9 % (FLUSH) 0.9 %
5-40 SYRINGE (ML) INJECTION PRN
Status: CANCELLED | OUTPATIENT
Start: 2021-08-25

## 2021-07-28 RX ORDER — METHYLPREDNISOLONE SODIUM SUCCINATE 125 MG/2ML
125 INJECTION, POWDER, LYOPHILIZED, FOR SOLUTION INTRAMUSCULAR; INTRAVENOUS ONCE
Status: CANCELLED | OUTPATIENT
Start: 2021-09-29 | End: 2021-09-22

## 2021-07-28 RX ORDER — SODIUM CHLORIDE 0.9 % (FLUSH) 0.9 %
5-40 SYRINGE (ML) INJECTION PRN
Status: CANCELLED | OUTPATIENT
Start: 2021-10-13

## 2021-07-28 RX ORDER — HEPARIN SODIUM (PORCINE) LOCK FLUSH IV SOLN 100 UNIT/ML 100 UNIT/ML
500 SOLUTION INTRAVENOUS PRN
Status: CANCELLED | OUTPATIENT
Start: 2021-09-22

## 2021-07-28 RX ORDER — DIPHENHYDRAMINE HYDROCHLORIDE 50 MG/ML
50 INJECTION INTRAMUSCULAR; INTRAVENOUS ONCE
Status: CANCELLED | OUTPATIENT
Start: 2021-07-28 | End: 2021-07-28

## 2021-07-28 RX ORDER — EPINEPHRINE 1 MG/ML
0.3 INJECTION, SOLUTION, CONCENTRATE INTRAVENOUS PRN
Status: CANCELLED | OUTPATIENT
Start: 2021-10-13

## 2021-07-28 RX ORDER — DIPHENHYDRAMINE HYDROCHLORIDE 50 MG/ML
50 INJECTION INTRAMUSCULAR; INTRAVENOUS ONCE
Status: CANCELLED | OUTPATIENT
Start: 2021-08-11 | End: 2021-08-11

## 2021-07-28 RX ORDER — SODIUM CHLORIDE 9 MG/ML
25 INJECTION, SOLUTION INTRAVENOUS PRN
Status: CANCELLED | OUTPATIENT
Start: 2021-08-18

## 2021-07-28 RX ORDER — SODIUM CHLORIDE 9 MG/ML
25 INJECTION, SOLUTION INTRAVENOUS PRN
Status: CANCELLED | OUTPATIENT
Start: 2021-10-13

## 2021-07-28 RX ORDER — SODIUM CHLORIDE 9 MG/ML
20 INJECTION, SOLUTION INTRAVENOUS ONCE
Status: CANCELLED | OUTPATIENT
Start: 2021-08-11 | End: 2021-08-11

## 2021-07-28 RX ORDER — SODIUM CHLORIDE 9 MG/ML
INJECTION, SOLUTION INTRAVENOUS CONTINUOUS
Status: CANCELLED | OUTPATIENT
Start: 2021-10-20

## 2021-07-28 RX ORDER — METHYLPREDNISOLONE SODIUM SUCCINATE 125 MG/2ML
125 INJECTION, POWDER, LYOPHILIZED, FOR SOLUTION INTRAMUSCULAR; INTRAVENOUS ONCE
Status: CANCELLED | OUTPATIENT
Start: 2021-09-01 | End: 2021-09-01

## 2021-07-28 RX ORDER — METHYLPREDNISOLONE SODIUM SUCCINATE 125 MG/2ML
125 INJECTION, POWDER, LYOPHILIZED, FOR SOLUTION INTRAMUSCULAR; INTRAVENOUS ONCE
Status: CANCELLED | OUTPATIENT
Start: 2021-09-08 | End: 2021-09-08

## 2021-07-28 RX ORDER — SODIUM CHLORIDE 9 MG/ML
25 INJECTION, SOLUTION INTRAVENOUS PRN
Status: CANCELLED | OUTPATIENT
Start: 2021-09-29

## 2021-07-28 RX ORDER — SODIUM CHLORIDE 9 MG/ML
20 INJECTION, SOLUTION INTRAVENOUS ONCE
Status: CANCELLED | OUTPATIENT
Start: 2021-08-18 | End: 2021-08-18

## 2021-07-28 RX ORDER — HEPARIN SODIUM (PORCINE) LOCK FLUSH IV SOLN 100 UNIT/ML 100 UNIT/ML
500 SOLUTION INTRAVENOUS PRN
Status: CANCELLED | OUTPATIENT
Start: 2021-08-18

## 2021-07-28 RX ORDER — DIPHENHYDRAMINE HYDROCHLORIDE 50 MG/ML
50 INJECTION INTRAMUSCULAR; INTRAVENOUS ONCE
Status: CANCELLED | OUTPATIENT
Start: 2021-08-04 | End: 2021-08-04

## 2021-07-28 RX ORDER — SODIUM CHLORIDE 9 MG/ML
INJECTION, SOLUTION INTRAVENOUS CONTINUOUS
Status: CANCELLED | OUTPATIENT
Start: 2021-10-06

## 2021-07-28 RX ADMIN — BORTEZOMIB 2.88 MG: 3.5 INJECTION, POWDER, LYOPHILIZED, FOR SOLUTION INTRAVENOUS; SUBCUTANEOUS at 11:45

## 2021-07-28 NOTE — PROGRESS NOTES
Here for Velcade. Pt voiced edema in BLE, assessed edema +2. Reviewed dexamethasone administration, pt taking 20 mg weekly. States she was off revlamid last week, will start day 1 today. Encouraged keeping feet elevated, using compression socks, and limiting salt intake. Denies SOB. Weight gain noted since last week. Reviewed with Dr. Corby Jolly. No new orders. Reviewed CBCD with patient. WDL for treatment. Pt agreeable to POC. Injection administered as ordered. Tolerated well. Reviewed AVS, copy given to patient. Discharged in stable condition.

## 2021-07-30 ENCOUNTER — TELEPHONE (OUTPATIENT)
Dept: ONCOLOGY | Age: 71
End: 2021-07-30

## 2021-07-30 LAB
ALBUMIN ELP: 3.1 GM/DL (ref 3.2–5.6)
ALPHA-1-GLOBULIN: 0.3 GM/DL (ref 0.1–0.4)
ALPHA-2-GLOBULIN: 0.8 GM/DL (ref 0.4–1.2)
BETA GLOBULIN: 1.2 GM/DL (ref 0.5–1.3)
BETA-2 MICROGLOBULIN: 2.6 MG/L (ref 1.1–2.4)
GAMMA GLOBULIN: 0.9 GM/DL (ref 0.5–1.6)
KAPPA QUANT FREE LIGHT CHAINS: 24.88 MG/L (ref 3.3–19.4)
KAPPA/LAMBDA FREE LIGHT CHAIN RATIO: 1.39 (ref 0.26–1.65)
LAMBDA FREE LIGHT CHAINS QNT: 17.89 MG/L (ref 5.71–26.3)
SPEP INTERPRETATION: ABNORMAL
SPEP INTERPRETATION: NORMAL
TOTAL PROTEIN: 6.4 GM/DL (ref 6.4–8.2)

## 2021-07-30 RX ORDER — FUROSEMIDE 20 MG/1
20 TABLET ORAL DAILY PRN
Qty: 30 TABLET | Refills: 0 | Status: SHIPPED | OUTPATIENT
Start: 2021-07-30

## 2021-07-30 NOTE — TELEPHONE ENCOUNTER
Patient is having issues with swelling of the feet. She was here on Wednesday and was informed that if it didn't get better that she should call us back and Dr. Sundeep Carr would put in a script for some water pills. Give her a call at [phone].

## 2021-08-04 ENCOUNTER — HOSPITAL ENCOUNTER (OUTPATIENT)
Dept: INFUSION THERAPY | Age: 71
Discharge: HOME | End: 2021-08-04
Payer: MEDICARE

## 2021-08-04 VITALS
TEMPERATURE: 96.6 F | HEART RATE: 67 BPM | WEIGHT: 236 LBS | DIASTOLIC BLOOD PRESSURE: 75 MMHG | SYSTOLIC BLOOD PRESSURE: 159 MMHG | RESPIRATION RATE: 16 BRPM | HEIGHT: 66 IN | OXYGEN SATURATION: 94 % | BODY MASS INDEX: 37.93 KG/M2

## 2021-08-04 DIAGNOSIS — C90.00 MULTIPLE MYELOMA NOT HAVING ACHIEVED REMISSION (HCC): Primary | ICD-10-CM

## 2021-08-04 LAB
BASOPHILS ABSOLUTE: 0 K/CU MM
BASOPHILS RELATIVE PERCENT: 0.2 % (ref 0–1)
DIFFERENTIAL TYPE: ABNORMAL
EOSINOPHILS ABSOLUTE: 0.1 K/CU MM
EOSINOPHILS RELATIVE PERCENT: 1.2 % (ref 0–3)
HCT VFR BLD CALC: 34.6 % (ref 37–47)
HEMOGLOBIN: 11.2 GM/DL (ref 12.5–16)
LYMPHOCYTES ABSOLUTE: 0.7 K/CU MM
LYMPHOCYTES RELATIVE PERCENT: 12.1 % (ref 24–44)
MCH RBC QN AUTO: 28.9 PG (ref 27–31)
MCHC RBC AUTO-ENTMCNC: 32.4 % (ref 32–36)
MCV RBC AUTO: 89.4 FL (ref 78–100)
MONOCYTES ABSOLUTE: 0.2 K/CU MM
MONOCYTES RELATIVE PERCENT: 2.9 % (ref 0–4)
PDW BLD-RTO: 16.7 % (ref 11.7–14.9)
PLATELET # BLD: 158 K/CU MM (ref 140–440)
PMV BLD AUTO: 10.9 FL (ref 7.5–11.1)
RBC # BLD: 3.87 M/CU MM (ref 4.2–5.4)
SEGMENTED NEUTROPHILS ABSOLUTE COUNT: 4.9 K/CU MM
SEGMENTED NEUTROPHILS RELATIVE PERCENT: 83.6 % (ref 36–66)
WBC # BLD: 5.9 K/CU MM (ref 4–10.5)

## 2021-08-04 PROCEDURE — 6360000002 HC RX W HCPCS: Performed by: INTERNAL MEDICINE

## 2021-08-04 PROCEDURE — 85025 COMPLETE CBC W/AUTO DIFF WBC: CPT

## 2021-08-04 PROCEDURE — 96401 CHEMO ANTI-NEOPL SQ/IM: CPT

## 2021-08-04 PROCEDURE — 2580000003 HC RX 258: Performed by: INTERNAL MEDICINE

## 2021-08-04 PROCEDURE — 36415 COLL VENOUS BLD VENIPUNCTURE: CPT

## 2021-08-04 RX ADMIN — BORTEZOMIB 2.88 MG: 3.5 INJECTION, POWDER, LYOPHILIZED, FOR SOLUTION INTRAVENOUS; SUBCUTANEOUS at 11:21

## 2021-08-09 DIAGNOSIS — C90.00 MULTIPLE MYELOMA NOT HAVING ACHIEVED REMISSION (HCC): Primary | ICD-10-CM

## 2021-08-11 ENCOUNTER — HOSPITAL ENCOUNTER (OUTPATIENT)
Dept: INFUSION THERAPY | Age: 71
Discharge: HOME | End: 2021-08-11
Payer: MEDICARE

## 2021-08-11 ENCOUNTER — OFFICE VISIT (OUTPATIENT)
Dept: ONCOLOGY | Age: 71
End: 2021-08-11
Payer: MEDICARE

## 2021-08-11 VITALS
DIASTOLIC BLOOD PRESSURE: 68 MMHG | BODY MASS INDEX: 36.9 KG/M2 | WEIGHT: 229.6 LBS | OXYGEN SATURATION: 95 % | TEMPERATURE: 95.6 F | HEART RATE: 61 BPM | SYSTOLIC BLOOD PRESSURE: 148 MMHG | HEIGHT: 66 IN

## 2021-08-11 DIAGNOSIS — C90.00 MULTIPLE MYELOMA NOT HAVING ACHIEVED REMISSION (HCC): Primary | ICD-10-CM

## 2021-08-11 LAB
ALBUMIN SERPL-MCNC: 4.2 GM/DL (ref 3.4–5)
ALP BLD-CCNC: 91 IU/L (ref 40–129)
ALT SERPL-CCNC: 16 U/L (ref 10–40)
ANION GAP SERPL CALCULATED.3IONS-SCNC: 9 MMOL/L (ref 4–16)
AST SERPL-CCNC: 14 IU/L (ref 15–37)
BASOPHILS ABSOLUTE: 0 K/CU MM
BASOPHILS RELATIVE PERCENT: 0 % (ref 0–1)
BILIRUB SERPL-MCNC: 0.6 MG/DL (ref 0–1)
BUN BLDV-MCNC: 24 MG/DL (ref 6–23)
CALCIUM SERPL-MCNC: 8.3 MG/DL (ref 8.3–10.6)
CHLORIDE BLD-SCNC: 103 MMOL/L (ref 99–110)
CO2: 26 MMOL/L (ref 21–32)
CREAT SERPL-MCNC: 1 MG/DL (ref 0.6–1.1)
DIFFERENTIAL TYPE: ABNORMAL
EOSINOPHILS ABSOLUTE: 0 K/CU MM
EOSINOPHILS RELATIVE PERCENT: 0.3 % (ref 0–3)
ERYTHROCYTE SEDIMENTATION RATE: 39 MM/HR (ref 0–30)
GFR AFRICAN AMERICAN: >60 ML/MIN/1.73M2
GFR NON-AFRICAN AMERICAN: 55 ML/MIN/1.73M2
GLUCOSE BLD-MCNC: 127 MG/DL (ref 70–99)
HCT VFR BLD CALC: 33.5 % (ref 37–47)
HEMOGLOBIN: 10.8 GM/DL (ref 12.5–16)
LACTATE DEHYDROGENASE: 221 IU/L (ref 120–246)
LYMPHOCYTES ABSOLUTE: 0.4 K/CU MM
LYMPHOCYTES RELATIVE PERCENT: 13.4 % (ref 24–44)
MCH RBC QN AUTO: 29.3 PG (ref 27–31)
MCHC RBC AUTO-ENTMCNC: 32.2 % (ref 32–36)
MCV RBC AUTO: 90.8 FL (ref 78–100)
MONOCYTES ABSOLUTE: 0.1 K/CU MM
MONOCYTES RELATIVE PERCENT: 1.9 % (ref 0–4)
PDW BLD-RTO: 16.7 % (ref 11.7–14.9)
PLATELET # BLD: 113 K/CU MM (ref 140–440)
PMV BLD AUTO: 11.5 FL (ref 7.5–11.1)
POTASSIUM SERPL-SCNC: 4.4 MMOL/L (ref 3.5–5.1)
RBC # BLD: 3.69 M/CU MM (ref 4.2–5.4)
SEGMENTED NEUTROPHILS ABSOLUTE COUNT: 2.7 K/CU MM
SEGMENTED NEUTROPHILS RELATIVE PERCENT: 84.4 % (ref 36–66)
SODIUM BLD-SCNC: 138 MMOL/L (ref 135–145)
TOTAL PROTEIN: 6.5 GM/DL (ref 6.4–8.2)
WBC # BLD: 3.2 K/CU MM (ref 4–10.5)

## 2021-08-11 PROCEDURE — 83615 LACTATE (LD) (LDH) ENZYME: CPT

## 2021-08-11 PROCEDURE — 82784 ASSAY IGA/IGD/IGG/IGM EACH: CPT

## 2021-08-11 PROCEDURE — 6360000002 HC RX W HCPCS: Performed by: INTERNAL MEDICINE

## 2021-08-11 PROCEDURE — G8400 PT W/DXA NO RESULTS DOC: HCPCS | Performed by: INTERNAL MEDICINE

## 2021-08-11 PROCEDURE — 86320 SERUM IMMUNOELECTROPHORESIS: CPT

## 2021-08-11 PROCEDURE — 1090F PRES/ABSN URINE INCON ASSESS: CPT | Performed by: INTERNAL MEDICINE

## 2021-08-11 PROCEDURE — 83883 ASSAY NEPHELOMETRY NOT SPEC: CPT

## 2021-08-11 PROCEDURE — 85652 RBC SED RATE AUTOMATED: CPT

## 2021-08-11 PROCEDURE — G8417 CALC BMI ABV UP PARAM F/U: HCPCS | Performed by: INTERNAL MEDICINE

## 2021-08-11 PROCEDURE — G8427 DOCREV CUR MEDS BY ELIG CLIN: HCPCS | Performed by: INTERNAL MEDICINE

## 2021-08-11 PROCEDURE — 82232 ASSAY OF BETA-2 PROTEIN: CPT

## 2021-08-11 PROCEDURE — 1036F TOBACCO NON-USER: CPT | Performed by: INTERNAL MEDICINE

## 2021-08-11 PROCEDURE — 84165 PROTEIN E-PHORESIS SERUM: CPT

## 2021-08-11 PROCEDURE — 36415 COLL VENOUS BLD VENIPUNCTURE: CPT

## 2021-08-11 PROCEDURE — 3017F COLORECTAL CA SCREEN DOC REV: CPT | Performed by: INTERNAL MEDICINE

## 2021-08-11 PROCEDURE — 96401 CHEMO ANTI-NEOPL SQ/IM: CPT

## 2021-08-11 PROCEDURE — 80053 COMPREHEN METABOLIC PANEL: CPT

## 2021-08-11 PROCEDURE — 4040F PNEUMOC VAC/ADMIN/RCVD: CPT | Performed by: INTERNAL MEDICINE

## 2021-08-11 PROCEDURE — 2580000003 HC RX 258: Performed by: INTERNAL MEDICINE

## 2021-08-11 PROCEDURE — 99214 OFFICE O/P EST MOD 30 MIN: CPT | Performed by: INTERNAL MEDICINE

## 2021-08-11 PROCEDURE — 1123F ACP DISCUSS/DSCN MKR DOCD: CPT | Performed by: INTERNAL MEDICINE

## 2021-08-11 PROCEDURE — 85025 COMPLETE CBC W/AUTO DIFF WBC: CPT

## 2021-08-11 RX ADMIN — BORTEZOMIB 2.88 MG: 3.5 INJECTION, POWDER, LYOPHILIZED, FOR SOLUTION INTRAVENOUS; SUBCUTANEOUS at 11:23

## 2021-08-11 ASSESSMENT — PATIENT HEALTH QUESTIONNAIRE - PHQ9
SUM OF ALL RESPONSES TO PHQ QUESTIONS 1-9: 0
SUM OF ALL RESPONSES TO PHQ QUESTIONS 1-9: 0
1. LITTLE INTEREST OR PLEASURE IN DOING THINGS: 0
SUM OF ALL RESPONSES TO PHQ9 QUESTIONS 1 & 2: 0
SUM OF ALL RESPONSES TO PHQ QUESTIONS 1-9: 0
2. FEELING DOWN, DEPRESSED OR HOPELESS: 0

## 2021-08-11 NOTE — PROGRESS NOTES
Patient arrived to treatment suite from lab for Velcade injection. No questions or concerns for the doctor at this time. Treatment approved and given subcutaneously in left abdomen, band-aid applied. Patient tolerated well. Left treatment suite ambulatory. Discharge instructions given.

## 2021-08-11 NOTE — PROGRESS NOTES
Patient Name: Mar Ferrell  Patient : 1950  Patient MRN: [id number]     Primary Oncologist: Joesph Sabillon MD  Referring Provider: Boni Biswas MD     Date of Service: 2021      Chief Complaint:   Chief Complaint   Patient presents with    Follow-up    Chemotherapy     Patient Active Problem List:     Multiple myeloma not having achieved remission     HPI:   Ms. Wanda Quiroga is a 72-year-old very pleasant patient with a medical history significant for stage II cervical cancer diagnosed in , status post laser surgery, gastroesophageal reflux disease, initially referred to me on 2014 for evaluation of right tibial intramedullary lesion. She stated that she has been having right leg pain since 2014, which has gradually been getting worse over time. She was seen by her primary care physician and had x-ray on 2014. It showed abnormal lucency within the tibial shaft at the junction of the mid and proximal third. MRI of the right lower extremity was done on 2014, and it showed marrow replacing 5.8 cm intramedullary lesion in the right mid tibial shaft with cortical breakthrough and adjacent periostitis. This corresponds to the lytic/lucent lesion on the recent x-ray. Differential considerations include lytic metastatic disease or multiple myeloma. She was subsequently referred to me for evaluation. She had upper sternum tumor for the last four years, which is about 8 by 10 cm in diameter. She otherwise does not have any other significant symptoms. 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 It is too small to measure the quantity. Her ESR was mildly elevated to 49.       Laboratory results done on September 3, 2014, showed normal CBC, mild elevation in serum creatinine chemotherapy it was 4.32 (October 1, 2014), and decreased from pretreatment value of 141 on September 3, 2014. Her 24-hour urine protein has decreased to 90 mg per 24 hour on November 25, 2014, after third cycle of chemotherapy. It has decreased from 3.4 grams before chemotherapy. Ms. Cardell Duane completed her eight cycle of chemotherapy with Velcade, Revlimid and dexamethasone on February 27, 2015. Maintenance chemotherapy with Velcade was started on March 17, 2015. Since Ms. Cardell Duane developed significant neuropathy from maintenance chemotherapy with Velcade, we stopped Velcade on July 19, 2016. Since she has minimal residual disease and she is not considering stem-cell transplantation, I recommend to start melphalan and dexamethasone as a maintenance chemotherapy. It was started since August 11, 2016 and we stopped it on November 18, 2016, since melphalan is not available as of November 16, 2016. Since we could not get melphalan, we changed her chemotherapy to Revlimid and dexamethasone. She has been on Revlimid until January 18, 2017. I stopped the Revlimid since she could not tolerate Revlimid and dexamethasone very well. She also developed right lower extremity deep vein thrombosis secondary to the Revlimid. Anticoagulation therapy with Eliquis was started since January 18, 2017. We resumed her back on melphalan and prednisone since January 18, 2017. Eliquis was stopped on May 17, 2017, since she has been on Eliquis for about four months duration. Her D-dimer level done on May 17, 2017, was also within the normal range. Ms. Cardell Duane was found to have leukopenia, anemia, and thrombocytopenia on a blood test done on May 17, 2017, and I believe it is due to chemotherapy. Her chemotherapy was held since May 17, 2017. Since she is found to have recurrent multiple myeloma, we started chemotherapy with Velcade, Revlimid and dexamethasone since July 7, 2021.   Zoledronic acid was started on July 7, 2021 and we will continue to give it every 12 week intervals. On August 11, 2021, she presented to me for followup. I have been following Ms. Noel Dance for multiple myeloma and she is currently on close observation. She was on maintenance chemotherapy until May 7, 2017, and we stopped it after that since she could not tolerate maintenance chemotherapy because of significant myelosuppression. She has been under close observation since then. She noticed increase in size of her upper sternum lesion. It was decreased and back to normal size after chemo in 2016. I recognize that her serum lambda light chain has gradually increased over time. In view of her enlarging upper sternum lesion along with increasing lambda light chain, I believe she has recurrent multiple myeloma. I offered her to have repeat scans and bone marrow biopsy to confirm progression of the disease. However, she does not want to have repeat scans because it exacerbates her vitiligo. She also does not want to have biopsy at this moment. However, she agrees to start chemotherapy with Velcade, Revlimid and dexamethasone. Since her laboratory work-ups and clinical findings are consistent with recurrent multiple myeloma, we started first-line chemotherapy on July 7, 2021. Since she has been in remission for more than 4 years, we decided to rechallenge with velcade, revlimid and dexamethason. She is tolerating current chemotherapy very well and she does not encounter any major side effects from it. I recognize that her upper sternal mass is decreasing in size and I believe she is achieving response to current chemotherapy. I also recognize that her serum lambda light chain is back to normal after first cycle chemotherapy. I will repeat multiple myeloma panel again on August 18, 2021 after second cycle of chemotherapy. Edema of lower legs - Decrease the Methasone dose to 2 mg weekly.   I will also start Lasix 20 mg daily as needed basis. I will continue to monitor her edema closely. Revlimid induced thrombosis prophylaxis - I recommend her to continue with aspirin 81 mg daily. Herpes zoster prophylaxis - I recommend her to continue with acyclovir 400 mg twice daily. I also recommend to continue with zoledronic acid every twelve month intervals and her next treatment will be on September 29, 2021. For her hypertension, I recommend to continue with metoprolol 50 mg daily for now. Her blood pressure reading on today is normal. I also recommend her to continue with vitamin B12 supplementation. Health maintenance - I asked her to have age-appropriate cancer screening, exercise, low-fat and low-sodium diet. She said she is not ready to take covid vaccine at this moment. I will continue to monitor her blood pressure closely. PAST MEDICAL HISTORY:  Past medical history is significant for the following. 1. Gastroesophageal reflux disease. 2. Stage II cervical cancer diagnosed in 1980, status post laser surgery. PAST SURGICAL HISTORY:  Past surgical history is significant for the following. 1. Cholecystectomy in 1998.  2. Tonsillectomy and appendectomy. 3. Tubal ligation in 1981. 4. Cheloid removal in October 2002. FAMILY HISTORY:  Family history is significant for small cell lung cancer in her mother. No other family history of cancer disease. SOCIAL HISTORY:  She denies smoking, alcohol drinking, and illicit drug abuse. She is  for 11 years and has two children. She is currently working at [email or web address]. ALLERGIES:  She claimed to have allergies to aspirin and iron. Review of Systems: \"Per interval history; otherwise 10 point ROS is negative. \"   Her energy level is okay, appetite, and sleep are fair. She doesn't have fever, chills, night sweats, cough, shortness of breath, chest pain, hemoptysis, or palpitations.   Her bowel and bladder functions are normal. She doesn't have nausea, vomiting, abdominal pain, diarrhea, constipation, dysuria, loss of appetite, or weight loss. She denies neuropathy and she doesn't have bleeding or clotting issues. She denies any pain in her body. No anxiety or depression. The rest of the systems are unremarkable. Vital Signs:  BP (!) 148/68 (Site: Left Upper Arm, Position: Sitting, Cuff Size: Large Adult)   Pulse 61   Temp 95.6 °F (35.3 °C) (Temporal)   Ht 5' 6\" (1.676 m)   Wt 229 lb 9.6 oz (104.1 kg)   SpO2 95%   BMI 37.06 kg/m²     Physical Exam:  CONSTITUTIONAL: awake, alert, cooperative, no apparent distress   EYES: pupils equal, round and reactive to light, sclera clear and conjunctiva normal  ENT: Normocephalic, without obvious abnormality, atraumatic  NECK: supple, symmetrical, no jugular venous distension and no carotid bruits   HEMATOLOGIC/LYMPHATIC: no cervical, supraclavicular or axillary lymphadenopathy   LUNGS: VBS, no increased work of breathing, no wheezes, no rhonchi, no crackles, clear to auscultation,     CARDIOVASCULAR: regular rate and rhythm, normal S1 and S2, no murmur noted  ABDOMEN: normal bowel sounds x 4, non-tender, no masses palpated, no hepatosplenomegaly,  soft, non-distended,   MUSCULOSKELETAL: full range of motion noted, tone is normal  NEUROLOGIC: awake, alert, oriented to name, place and time. Motor skills grossly intact. SKIN: Normal skin color, texture, turgor and no jaundice.  appears intact   EXTREMITIES: no clubbing, no LE edema, no cyanosis, no leg swelling,       Labs:  Hematology:  Lab Results   Component Value Date    WBC 3.2 (L) 08/11/2021    RBC 3.69 (L) 08/11/2021    HGB 10.8 (L) 08/11/2021    HCT 33.5 (L) 08/11/2021    MCV 90.8 08/11/2021    MCH 29.3 08/11/2021    MCHC 32.2 08/11/2021    RDW 16.7 (H) 08/11/2021     (L) 08/11/2021    MPV 11.5 (H) 08/11/2021    BANDSPCT 5 03/08/2017    SEGSPCT 84.4 (H) 08/11/2021    EOSRELPCT 0.3 08/11/2021    BASOPCT 0.0 08/11/2021    LYMPHOPCT 13.4 (L) 08/11/2021    MONOPCT 1.9 08/11/2021    BANDABS 0.12 03/08/2017    SEGSABS 2.7 08/11/2021    EOSABS 0.0 08/11/2021    BASOSABS 0.0 08/11/2021    LYMPHSABS 0.4 08/11/2021    MONOSABS 0.1 08/11/2021    DIFFTYPE AUTOMATED DIFFERENTIAL 08/11/2021     Lab Results   Component Value Date    ESR 28 06/09/2021     Chemistry:  Lab Results   Component Value Date     07/28/2021    K 5.1 07/28/2021     07/28/2021    CO2 24 07/28/2021    BUN 30 (H) 07/28/2021    CREATININE 0.9 07/28/2021    GLUCOSE 98 07/28/2021    CALCIUM 8.7 07/28/2021    PROT 6.4 07/28/2021    PROT 6.4 07/28/2021    LABALBU 4.1 07/28/2021    BILITOT 0.2 07/28/2021    ALKPHOS 83 07/28/2021    AST 14 (L) 07/28/2021    ALT 23 07/28/2021    LABGLOM >60 07/28/2021    GFRAA >60 07/28/2021    POCCA 1.13 07/14/2021    POCGLU 114 (H) 07/14/2021     Lab Results   Component Value Date     06/09/2021     No components found for: LD  Lab Results   Component Value Date    TSHHS 2.340 10/31/2018     Immunology:  Lab Results   Component Value Date    PROT 6.4 07/28/2021    PROT 6.4 07/28/2021    SPEP  07/28/2021     INTERPRETATION - Nonspecific pattern, decreased albumin. No monoclonal gammopathy. RS    SPEP INTERPRETATION - Within normal limits.   RS 07/28/2021    ALBUMINELP 3.1 (L) 07/28/2021    LABALPH 0.3 07/28/2021    LABALPH 0.8 07/28/2021    LABBETA 1.2 07/28/2021    GAMGLOB 0.9 07/28/2021     Lab Results   Component Value Date    KAPPAUVOL 24.88 (H) 07/28/2021    LAMBDAUVOL 67.21 (H) 11/11/2020    KLFLCR 1.39 07/28/2021     Lab Results   Component Value Date    B2M 2.6 (H) 07/28/2021     Coagulation Panel:  Lab Results   Component Value Date    PROTIME 12.8 (H) 10/31/2018    INR 1.12 10/31/2018    APTT 29.7 10/31/2018    DDIMER <200 05/17/2017     Anemia Panel:  Lab Results   Component Value Date    GTVCSJIJ33 228.6 05/17/2017    FOLATE >20.0 (H) 05/17/2017     Tumor Markers:  No results found for: , CEA, , LABCA2, PSA  Observations:  PHQ-9 Total Score: 0 (8/11/2021  9:47 AM)        Assessment & Plan:   Right tibial intramedullary lesion and upper sternum lesion - with multiple lytic bone lesions - biopsy is consistent with multiple myeloma - s/p chemotherapy with Velcade, Revlimid and Dexamentasone - currently on maintenance Melphalan and prednisone. PLAN:  Ms. Eli uGevara has been followed for multiple myeloma. Since she is found to have recurrent multiple myeloma, we started chemotherapy with Velcade, Revlimid and dexamethasone since July 7, 2021. Zoledronic acid was started on July 7, 2021 and we will continue to give it every 12 week intervals. On August 11, 2021, she presented to me for followup. I have been following Ms. Eli Guevara for multiple myeloma and she is currently on close observation. She was on maintenance chemotherapy until May 7, 2017, and we stopped it after that since she could not tolerate maintenance chemotherapy because of significant myelosuppression. She has been under close observation since then. She noticed increase in size of her upper sternum lesion. It was decreased and back to normal size after chemo in 2016. I recognize that her serum lambda light chain has gradually increased over time. In view of her enlarging upper sternum lesion along with increasing lambda light chain, I believe she has recurrent multiple myeloma. I offered her to have repeat scans and bone marrow biopsy to confirm progression of the disease. However, she does not want to have repeat scans because it exacerbates her vitiligo. She also does not want to have biopsy at this moment. However, she agrees to start chemotherapy with Velcade, Revlimid and dexamethasone. Since her laboratory work-ups and clinical findings are consistent with recurrent multiple myeloma, we started first-line chemotherapy on July 7, 2021.  Since she has been in remission for more than 4 years, we decided to rechallenge with velcade, revlimid and dexamethason. She is tolerating current chemotherapy very well and she does not encounter any major side effects from it. I recognize that her upper sternal mass is decreasing in size and I believe she is achieving response to current chemotherapy. I also recognize that her serum lambda light chain is back to normal after first cycle chemotherapy. I will repeat multiple myeloma panel again on August 18, 2021 after second cycle of chemotherapy. Edema of lower legs - Decrease the Methasone dose to 2 mg weekly. I will also start Lasix 20 mg daily as needed basis. I will continue to monitor her edema closely. Revlimid induced thrombosis prophylaxis - I recommend her to continue with aspirin 81 mg daily. Herpes zoster prophylaxis - I recommend her to continue with acyclovir 400 mg twice daily. I also recommend to continue with zoledronic acid every twelve month intervals and her next treatment will be on September 29, 2021. For her hypertension, I recommend to continue with metoprolol 50 mg daily for now. Her blood pressure reading on today is normal. I also recommend her to continue with vitamin B12 supplementation. Health maintenance - I asked her to have age-appropriate cancer screening, exercise, low-fat and low-sodium diet. She said she is not ready to take covid vaccine at this moment. I answered all her questions and concerns for today. I asked her to follow up with primary care physician on regular basis. Recent imaging and labs were reviewed and discussed with the patient.

## 2021-08-11 NOTE — PROGRESS NOTES
MA Rooming Questions  Patient: Mavis Che  MRN: [id number]    Date: 8/11/2021        1. Do you have any new issues?   no         2. Do you need any refills on medications?    no    3. Have you had any imaging done since your last visit?   no    4. Have you been hospitalized or seen in the emergency room since your last visit here?   no    5. Did the patient have a depression screening completed today?  Yes    PHQ-9 Total Score: 0 (8/11/2021  9:47 AM)       PHQ-9 Given to (if applicable):               PHQ-9 Score (if applicable):                     [] Positive     []  Negative              Does question #9 need addressed (if applicable)                     [] Yes    []  No               Alexis Bhakta CMA

## 2021-08-12 LAB
ALBUMIN ELP: 3.3 GM/DL (ref 3.2–5.6)
ALPHA-1-GLOBULIN: 0.4 GM/DL (ref 0.1–0.4)
ALPHA-2-GLOBULIN: 0.9 GM/DL (ref 0.4–1.2)
BETA GLOBULIN: 1.1 GM/DL (ref 0.5–1.3)
GAMMA GLOBULIN: 0.8 GM/DL (ref 0.5–1.6)
IGA: 152 MG/DL (ref 69–382)
IGG,SERUM: 855 MG/DL (ref 723–1685)
IGM,SERUM: 37 MG/DL (ref 62–277)
SPEP INTERPRETATION: NORMAL
SPEP INTERPRETATION: NORMAL
TOTAL PROTEIN: 6.5 GM/DL (ref 6.4–8.2)

## 2021-08-13 LAB
BETA-2 MICROGLOBULIN: 2.8 MG/L (ref 1.1–2.4)
KAPPA QUANT FREE LIGHT CHAINS: 25.32 MG/L (ref 3.3–19.4)
KAPPA/LAMBDA FREE LIGHT CHAIN RATIO: 1.58 (ref 0.26–1.65)
LAMBDA FREE LIGHT CHAINS QNT: 16.02 MG/L (ref 5.71–26.3)

## 2021-08-18 ENCOUNTER — TELEPHONE (OUTPATIENT)
Dept: INFUSION THERAPY | Age: 71
End: 2021-08-18

## 2021-08-18 ENCOUNTER — HOSPITAL ENCOUNTER (OUTPATIENT)
Dept: INFUSION THERAPY | Age: 71
Discharge: HOME | End: 2021-08-18
Payer: MEDICARE

## 2021-08-18 VITALS
HEART RATE: 61 BPM | HEIGHT: 66 IN | OXYGEN SATURATION: 95 % | TEMPERATURE: 97.2 F | DIASTOLIC BLOOD PRESSURE: 77 MMHG | WEIGHT: 230 LBS | SYSTOLIC BLOOD PRESSURE: 152 MMHG | BODY MASS INDEX: 36.96 KG/M2

## 2021-08-18 DIAGNOSIS — C90.00 MULTIPLE MYELOMA NOT HAVING ACHIEVED REMISSION (HCC): Primary | ICD-10-CM

## 2021-08-18 LAB
BASOPHILS ABSOLUTE: 0 K/CU MM
BASOPHILS RELATIVE PERCENT: 0.3 % (ref 0–1)
DIFFERENTIAL TYPE: ABNORMAL
EOSINOPHILS ABSOLUTE: 0 K/CU MM
EOSINOPHILS RELATIVE PERCENT: 0 % (ref 0–3)
HCT VFR BLD CALC: 32.1 % (ref 37–47)
HEMOGLOBIN: 10.5 GM/DL (ref 12.5–16)
LYMPHOCYTES ABSOLUTE: 0.5 K/CU MM
LYMPHOCYTES RELATIVE PERCENT: 16.5 % (ref 24–44)
MCH RBC QN AUTO: 29.4 PG (ref 27–31)
MCHC RBC AUTO-ENTMCNC: 32.7 % (ref 32–36)
MCV RBC AUTO: 89.9 FL (ref 78–100)
MONOCYTES ABSOLUTE: 0.1 K/CU MM
MONOCYTES RELATIVE PERCENT: 3.5 % (ref 0–4)
PDW BLD-RTO: 17 % (ref 11.7–14.9)
PLATELET # BLD: 186 K/CU MM (ref 140–440)
PMV BLD AUTO: 9.9 FL (ref 7.5–11.1)
RBC # BLD: 3.57 M/CU MM (ref 4.2–5.4)
SEGMENTED NEUTROPHILS ABSOLUTE COUNT: 2.5 K/CU MM
SEGMENTED NEUTROPHILS RELATIVE PERCENT: 79.7 % (ref 36–66)
WBC # BLD: 3.2 K/CU MM (ref 4–10.5)

## 2021-08-18 PROCEDURE — 6360000002 HC RX W HCPCS: Performed by: INTERNAL MEDICINE

## 2021-08-18 PROCEDURE — 2580000003 HC RX 258: Performed by: INTERNAL MEDICINE

## 2021-08-18 PROCEDURE — 36415 COLL VENOUS BLD VENIPUNCTURE: CPT

## 2021-08-18 PROCEDURE — 80053 COMPREHEN METABOLIC PANEL: CPT

## 2021-08-18 PROCEDURE — 96402 CHEMO HORMON ANTINEOPL SQ/IM: CPT

## 2021-08-18 PROCEDURE — 85025 COMPLETE CBC W/AUTO DIFF WBC: CPT

## 2021-08-18 RX ADMIN — BORTEZOMIB 2.88 MG: 3.5 INJECTION, POWDER, LYOPHILIZED, FOR SOLUTION INTRAVENOUS; SUBCUTANEOUS at 11:58

## 2021-08-18 NOTE — TELEPHONE ENCOUNTER
Pt CHUNG with questions about her next tx date. Returned call @ 2:07 & CHUNG informing her she is due for tx on 8/25 & I scheduled her for 11:30; asked her to call back if she has any other questions.

## 2021-08-18 NOTE — PROGRESS NOTES
Patient ambulated to treatment room after labs were drawn. States she has been feeling okay with some fatigue. Denies pain. Continues working. Appetite okay. CBC and CMP done. Chemo given as ordered. RTC 1 week for next treatment.

## 2021-08-23 ENCOUNTER — TELEPHONE (OUTPATIENT)
Dept: ONCOLOGY | Age: 71
End: 2021-08-23

## 2021-08-23 NOTE — TELEPHONE ENCOUNTER
Patient left a message requesting a renewal on her Handicap placard and will  on Wed. 08/25 when she is here for her tx, letter completed and at the  for her to  per her request

## 2021-08-25 ENCOUNTER — HOSPITAL ENCOUNTER (OUTPATIENT)
Dept: INFUSION THERAPY | Age: 71
Discharge: HOME | End: 2021-08-25
Payer: MEDICARE

## 2021-08-25 ENCOUNTER — TELEPHONE (OUTPATIENT)
Dept: ONCOLOGY | Age: 71
End: 2021-08-25

## 2021-08-25 VITALS
HEART RATE: 62 BPM | HEIGHT: 66 IN | DIASTOLIC BLOOD PRESSURE: 63 MMHG | SYSTOLIC BLOOD PRESSURE: 127 MMHG | OXYGEN SATURATION: 98 % | WEIGHT: 227.6 LBS | TEMPERATURE: 97.7 F | RESPIRATION RATE: 16 BRPM | BODY MASS INDEX: 36.58 KG/M2

## 2021-08-25 DIAGNOSIS — C90.00 MULTIPLE MYELOMA NOT HAVING ACHIEVED REMISSION (HCC): Primary | ICD-10-CM

## 2021-08-25 LAB
BASOPHILS ABSOLUTE: 0 K/CU MM
BASOPHILS RELATIVE PERCENT: 0.3 % (ref 0–1)
DIFFERENTIAL TYPE: ABNORMAL
EOSINOPHILS ABSOLUTE: 0 K/CU MM
EOSINOPHILS RELATIVE PERCENT: 0.3 % (ref 0–3)
GFR AFRICAN AMERICAN: >60 ML/MIN/1.73M2
GFR NON-AFRICAN AMERICAN: 58 ML/MIN/1.73M2
GLUCOSE BLD-MCNC: 160 MG/DL (ref 70–99)
HCT VFR BLD CALC: 31 % (ref 37–47)
HEMOGLOBIN: 10.1 GM/DL (ref 12.5–16)
LYMPHOCYTES ABSOLUTE: 0.4 K/CU MM
LYMPHOCYTES RELATIVE PERCENT: 11 % (ref 24–44)
MCH RBC QN AUTO: 29 PG (ref 27–31)
MCHC RBC AUTO-ENTMCNC: 32.6 % (ref 32–36)
MCV RBC AUTO: 89.1 FL (ref 78–100)
MONOCYTES ABSOLUTE: 0.1 K/CU MM
MONOCYTES RELATIVE PERCENT: 1.7 % (ref 0–4)
PDW BLD-RTO: 17 % (ref 11.7–14.9)
PLATELET # BLD: 160 K/CU MM (ref 140–440)
PMV BLD AUTO: 10.9 FL (ref 7.5–11.1)
POC BUN: 18 MG/DL (ref 8–26)
POC CALCIUM: 1.15 MMOL/L (ref 1.12–1.32)
POC CHLORIDE: 107 MMOL/L (ref 98–109)
POC CO2: 24 MMOL/L (ref 21–32)
POC CREATININE: 1 MG/DL (ref 0.6–1.1)
POTASSIUM SERPL-SCNC: 4.6 MMOL/L (ref 3.5–4.5)
RBC # BLD: 3.48 M/CU MM (ref 4.2–5.4)
SEGMENTED NEUTROPHILS ABSOLUTE COUNT: 3.2 K/CU MM
SEGMENTED NEUTROPHILS RELATIVE PERCENT: 86.7 % (ref 36–66)
SODIUM BLD-SCNC: 140 MMOL/L (ref 138–146)
SOURCE, BLOOD GAS: ABNORMAL
WBC # BLD: 3.6 K/CU MM (ref 4–10.5)

## 2021-08-25 PROCEDURE — 96401 CHEMO ANTI-NEOPL SQ/IM: CPT

## 2021-08-25 PROCEDURE — 6360000002 HC RX W HCPCS: Performed by: INTERNAL MEDICINE

## 2021-08-25 PROCEDURE — 36415 COLL VENOUS BLD VENIPUNCTURE: CPT

## 2021-08-25 PROCEDURE — 85025 COMPLETE CBC W/AUTO DIFF WBC: CPT

## 2021-08-25 PROCEDURE — 2580000003 HC RX 258: Performed by: INTERNAL MEDICINE

## 2021-08-25 RX ADMIN — BORTEZOMIB 2.88 MG: 3.5 INJECTION, POWDER, LYOPHILIZED, FOR SOLUTION INTRAVENOUS; SUBCUTANEOUS at 11:54

## 2021-08-25 NOTE — TELEPHONE ENCOUNTER
Patient states she is having issues with diarrhea when she drinks milk would like to know if there is anything otc she can take. She does not really want to give up milk.

## 2021-08-25 NOTE — PROGRESS NOTES
Pt. Here for treatment following lab work, pt. Denies any questions or concerns. Labs reviewed and injection given as ordered. Patient's status assessed and documented appropriately. All labs and required results were also reviewed today. Treatment parameters have been reviewed. Today's treatment has been approved by the provider. Treatment orders and medication sequencing (when applicable) was verified by 2 registered nurses. The treatment plan was confirmed with the patient prior to administration, and the patient understands the need to report any treatment-related symptoms. Prior to administration, when applicable, the following 8 elements of medication administration were reviewed with 2nd Registered Nurse prior to dosing: drug name, drug dose, infusion volume when prepared in a syringe, rate of administration, expiration dates and/or times, appearance and integrity of drug(s), and rate of pump for infusion. The 5 rights of medication administration have been verified.

## 2021-08-25 NOTE — TELEPHONE ENCOUNTER
Called patient. She reports that she has diarrhea when drinking milk but tolerates cheese and other daily. Patient is taking revlimid and is receiving velcade which can have this side effect. Patient states that imodium is ineffective and is using Pepto Bismol with effect. She stated that the diarrhea was bad enough that she had missed 2 days of work. Discussed lactose intolerance with patient. Discussed the potential side effect of diarrhea with both Velcade and Revlimid. Will inform Dr John Zapata.

## 2021-08-26 DIAGNOSIS — C90.00 MULTIPLE MYELOMA NOT HAVING ACHIEVED REMISSION (HCC): Primary | ICD-10-CM

## 2021-08-26 RX ORDER — DIPHENOXYLATE HYDROCHLORIDE AND ATROPINE SULFATE 2.5; .025 MG/1; MG/1
1 TABLET ORAL 4 TIMES DAILY PRN
Qty: 120 TABLET | Refills: 1 | Status: SHIPPED | OUTPATIENT
Start: 2021-08-26 | End: 2021-09-25

## 2021-08-30 DIAGNOSIS — C90.00 MULTIPLE MYELOMA NOT HAVING ACHIEVED REMISSION (HCC): Primary | ICD-10-CM

## 2021-09-01 ENCOUNTER — HOSPITAL ENCOUNTER (OUTPATIENT)
Dept: INFUSION THERAPY | Age: 71
Discharge: HOME | End: 2021-09-01
Payer: MEDICARE

## 2021-09-01 ENCOUNTER — OFFICE VISIT (OUTPATIENT)
Dept: ONCOLOGY | Age: 71
End: 2021-09-01
Payer: MEDICARE

## 2021-09-01 VITALS
DIASTOLIC BLOOD PRESSURE: 62 MMHG | TEMPERATURE: 97.4 F | RESPIRATION RATE: 18 BRPM | HEART RATE: 65 BPM | HEIGHT: 66 IN | SYSTOLIC BLOOD PRESSURE: 129 MMHG | WEIGHT: 224 LBS | BODY MASS INDEX: 36 KG/M2 | OXYGEN SATURATION: 99 %

## 2021-09-01 VITALS
DIASTOLIC BLOOD PRESSURE: 62 MMHG | OXYGEN SATURATION: 99 % | TEMPERATURE: 97.4 F | HEART RATE: 65 BPM | RESPIRATION RATE: 18 BRPM | BODY MASS INDEX: 36 KG/M2 | HEIGHT: 66 IN | WEIGHT: 224 LBS | SYSTOLIC BLOOD PRESSURE: 129 MMHG

## 2021-09-01 DIAGNOSIS — C90.00 MULTIPLE MYELOMA NOT HAVING ACHIEVED REMISSION (HCC): Primary | ICD-10-CM

## 2021-09-01 DIAGNOSIS — C90.00 MULTIPLE MYELOMA NOT HAVING ACHIEVED REMISSION (HCC): ICD-10-CM

## 2021-09-01 LAB
BASOPHILS ABSOLUTE: 0 K/CU MM
BASOPHILS RELATIVE PERCENT: 0.3 % (ref 0–1)
DIFFERENTIAL TYPE: ABNORMAL
EOSINOPHILS ABSOLUTE: 0 K/CU MM
EOSINOPHILS RELATIVE PERCENT: 0.5 % (ref 0–3)
GFR AFRICAN AMERICAN: 52 ML/MIN/1.73M2
GFR NON-AFRICAN AMERICAN: 43 ML/MIN/1.73M2
GLUCOSE BLD-MCNC: 174 MG/DL (ref 70–99)
HCT VFR BLD CALC: 31.1 % (ref 37–47)
HEMOGLOBIN: 10.2 GM/DL (ref 12.5–16)
LYMPHOCYTES ABSOLUTE: 0.4 K/CU MM
LYMPHOCYTES RELATIVE PERCENT: 10.8 % (ref 24–44)
MCH RBC QN AUTO: 29 PG (ref 27–31)
MCHC RBC AUTO-ENTMCNC: 32.8 % (ref 32–36)
MCV RBC AUTO: 88.4 FL (ref 78–100)
MONOCYTES ABSOLUTE: 0.2 K/CU MM
MONOCYTES RELATIVE PERCENT: 4.6 % (ref 0–4)
PDW BLD-RTO: 17.5 % (ref 11.7–14.9)
PLATELET # BLD: 115 K/CU MM (ref 140–440)
PMV BLD AUTO: 11.8 FL (ref 7.5–11.1)
POC BUN: 27 MG/DL (ref 8–26)
POC CALCIUM: 1.19 MMOL/L (ref 1.12–1.32)
POC CHLORIDE: 107 MMOL/L (ref 98–109)
POC CO2: 25 MMOL/L (ref 21–32)
POC CREATININE: 1.2 MG/DL (ref 0.6–1.1)
POTASSIUM SERPL-SCNC: 4.8 MMOL/L (ref 3.5–4.5)
RBC # BLD: 3.52 M/CU MM (ref 4.2–5.4)
SEGMENTED NEUTROPHILS ABSOLUTE COUNT: 3.3 K/CU MM
SEGMENTED NEUTROPHILS RELATIVE PERCENT: 83.8 % (ref 36–66)
SODIUM BLD-SCNC: 139 MMOL/L (ref 138–146)
SOURCE, BLOOD GAS: ABNORMAL
WBC # BLD: 3.9 K/CU MM (ref 4–10.5)

## 2021-09-01 PROCEDURE — 99214 OFFICE O/P EST MOD 30 MIN: CPT | Performed by: INTERNAL MEDICINE

## 2021-09-01 PROCEDURE — 1123F ACP DISCUSS/DSCN MKR DOCD: CPT | Performed by: INTERNAL MEDICINE

## 2021-09-01 PROCEDURE — G8417 CALC BMI ABV UP PARAM F/U: HCPCS | Performed by: INTERNAL MEDICINE

## 2021-09-01 PROCEDURE — G8400 PT W/DXA NO RESULTS DOC: HCPCS | Performed by: INTERNAL MEDICINE

## 2021-09-01 PROCEDURE — 6360000002 HC RX W HCPCS: Performed by: INTERNAL MEDICINE

## 2021-09-01 PROCEDURE — 1036F TOBACCO NON-USER: CPT | Performed by: INTERNAL MEDICINE

## 2021-09-01 PROCEDURE — 96402 CHEMO HORMON ANTINEOPL SQ/IM: CPT

## 2021-09-01 PROCEDURE — 4040F PNEUMOC VAC/ADMIN/RCVD: CPT | Performed by: INTERNAL MEDICINE

## 2021-09-01 PROCEDURE — G8427 DOCREV CUR MEDS BY ELIG CLIN: HCPCS | Performed by: INTERNAL MEDICINE

## 2021-09-01 PROCEDURE — 1090F PRES/ABSN URINE INCON ASSESS: CPT | Performed by: INTERNAL MEDICINE

## 2021-09-01 PROCEDURE — 2580000003 HC RX 258: Performed by: INTERNAL MEDICINE

## 2021-09-01 PROCEDURE — 85025 COMPLETE CBC W/AUTO DIFF WBC: CPT

## 2021-09-01 PROCEDURE — 3017F COLORECTAL CA SCREEN DOC REV: CPT | Performed by: INTERNAL MEDICINE

## 2021-09-01 PROCEDURE — 36415 COLL VENOUS BLD VENIPUNCTURE: CPT

## 2021-09-01 RX ORDER — LENALIDOMIDE 15 MG/1
CAPSULE ORAL
Qty: 14 CAPSULE | Refills: 5 | OUTPATIENT
Start: 2021-09-01

## 2021-09-01 RX ORDER — LENALIDOMIDE 10 MG/1
10 CAPSULE ORAL DAILY
Qty: 14 CAPSULE | Refills: 0 | Status: SHIPPED | OUTPATIENT
Start: 2021-09-01 | End: 2021-09-22

## 2021-09-01 RX ADMIN — BORTEZOMIB 2.88 MG: 3.5 INJECTION, POWDER, LYOPHILIZED, FOR SOLUTION INTRAVENOUS; SUBCUTANEOUS at 11:52

## 2021-09-01 NOTE — PROGRESS NOTES
MA Rooming Questions  Patient: Phuong Nuñez  MRN: [id number]    Date: 9/1/2021        1. Do you have any new issues?   no         2. Do you need any refills on medications?    no    3. Have you had any imaging done since your last visit?   no    4. Have you been hospitalized or seen in the emergency room since your last visit here?   no    5. Did the patient have a depression screening completed today?  No    No data recorded     PHQ-9 Given to (if applicable):               PHQ-9 Score (if applicable):                     [] Positive     []  Negative              Does question #9 need addressed (if applicable)                     [] Yes    []  No               Marley Vergara CMA

## 2021-09-01 NOTE — TELEPHONE ENCOUNTER
Phoned pt and left message to review results of elevated potassium and creatinine. Pt to aggressively hydrate and refrain from potassium rich foods such as bananas, oranges, and potatoes. Requested call back to confirm receipt of message.

## 2021-09-01 NOTE — PROGRESS NOTES
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 Quantitative immunoglobulin IgA was elevated (760), IgM (41 mg/dl), and IgG (926). She also has 3.4 grams of protein in 23 hour urine and urine immunoelectrophoresis is consistent with monoclonal free Lambda light chains. She had biopsy of the right tibial lesions and sternal lesions on August 29, 2014 and final pathology was consistent with plasma cell myeloma. Flow cytometry was consistent with 23 percent of CD56 positive clonal plasma cell population, consistent with plasma cell neoplasms. FISH study showed \"Abnormal Myeloma FISH Profile. Monosomy for chromosome 13. Aneuploid population: Gain of chromosomes 15 and FGFR3/4p\". Cytogenetics wasn't performed by laboratory. Complete bone survey was done on September 3, 2014, and it showed multiple lucent lesions involving the visualized bony skeleton concerning for metastatic disease. This involves the skull, T10 vertebral body, left humerus, and right femur. Faint opacity overlying the left upper lobe could be related to atelectasis, mass, and/or infiltrate. Followup chest radiograph is recommended to assure stability/resolution. The lungs are under aerated, which limits evaluation. I believe faint opacity in the left upper lobe is most likely due to upper sternal lesion. Chemotherapy with Velcade, Revlimid and dexamethasone was started on September 16, 2014. Palliative radiation therapy to the right tibia lesion was also started by Dr. Siddhartha Nuñez on September 29, 2014 and she completed it on October 10, 2014.     Her serum free Lambda light chain on November 4, 2014, after the second cycle of chemotherapy was 1.25 (normal), after the first cycle of chemotherapy it was 4.32 (October 1, 2014), and decreased from pretreatment value of 141 on September 3, 2014. Her 24-hour urine protein has decreased to 90 mg per 24 hour on November 25, 2014, after third cycle of chemotherapy. It has decreased from 3.4 grams before chemotherapy. Ms. Candido James completed her eight cycle of chemotherapy with Velcade, Revlimid and dexamethasone on February 27, 2015. Maintenance chemotherapy with Velcade was started on March 17, 2015. Since Ms. Candido James developed significant neuropathy from maintenance chemotherapy with Velcade, we stopped Velcade on July 19, 2016. Since she has minimal residual disease and she is not considering stem-cell transplantation, I recommend to start melphalan and dexamethasone as a maintenance chemotherapy. It was started since August 11, 2016 and we stopped it on November 18, 2016, since melphalan is not available as of November 16, 2016. Since we could not get melphalan, we changed her chemotherapy to Revlimid and dexamethasone. She has been on Revlimid until January 18, 2017. I stopped the Revlimid since she could not tolerate Revlimid and dexamethasone very well. She also developed right lower extremity deep vein thrombosis secondary to the Revlimid. Anticoagulation therapy with Eliquis was started since January 18, 2017. We resumed her back on melphalan and prednisone since January 18, 2017. Eliquis was stopped on May 17, 2017, since she has been on Eliquis for about four months duration. Her D-dimer level done on May 17, 2017, was also within the normal range. Ms. Candido James was found to have leukopenia, anemia, and thrombocytopenia on a blood test done on May 17, 2017, and I believe it is due to chemotherapy. Her chemotherapy was held since May 17, 2017. Since she is found to have recurrent multiple myeloma, we started chemotherapy with Velcade, Revlimid and dexamethasone since July 7, 2021.   Zoledronic acid was started on July 7, 2021 and we will continue to give it every 12 week intervals. On September 1, 2021, she presented to me for followup. I have been following Ms. Bar for multiple myeloma. She was on maintenance chemotherapy until May 7, 2017, and we stopped it after that since she could not tolerate maintenance chemotherapy because of significant myelosuppression. She was placed on under close observation. She noticed increase in size of her upper sternum lesion. It was decreased and back to normal size after chemo in 2016. I recognize that her serum lambda light chain has gradually increased over time. In view of her enlarging upper sternum lesion along with increasing lambda light chain, I believe she has recurrent multiple myeloma. I offered her to have repeat scans and bone marrow biopsy to confirm progression of the disease. However, she does not want to have repeat scans because it exacerbates her vitiligo. She also does not want to have biopsy at this moment. However, she agrees to start chemotherapy with Velcade, Revlimid and dexamethasone. Since her laboratory work-ups and clinical findings are consistent with recurrent multiple myeloma, we started first-line chemotherapy on July 7, 2021. Since she has been in remission for more than 4 years, we decided to rechallenge with velcade, revlimid and dexamethason. She will receive her cycle 3, day 15 velcade today. She is tolerating current chemotherapy quite well except she has been having significant diarrhea from revlimid. She has significant diarrhea from Revlimid, I will decrease the dose of Revlimid to 10 mg [14 days on 7 days of] starting from her fourth cycle. I recognize that her upper sternal mass is decreasing in size. I also recognize that her serum lambda light chain is back to normal after first cycle chemotherapy. I believe she is achieving response to current chemotherapy.   I will repeat multiple myeloma panel again on September 8, 2021 after third cycle of chemotherapy. Edema of lower legs - Decrease the Methasone dose to 2 mg weekly. I will also start Lasix 20 mg daily as needed basis. I will continue to monitor her edema closely. Revlimid induced thrombosis prophylaxis - I recommend her to continue with aspirin 81 mg daily. Herpes zoster prophylaxis - I recommend her to continue with acyclovir 400 mg twice daily. I also recommend to continue with zoledronic acid every twelve month intervals and her next treatment will be on September 29, 2021. For her hypertension, I recommend to continue with metoprolol 50 mg daily for now. Her blood pressure reading on today is normal. I also recommend her to continue with vitamin B12 supplementation. Health maintenance - I asked her to have age-appropriate cancer screening, exercise, low-fat and low-sodium diet. She said she is not ready to take covid vaccine at this moment. I will continue to monitor her blood pressure closely. PAST MEDICAL HISTORY:  Past medical history is significant for the following. 1. Gastroesophageal reflux disease. 2. Stage II cervical cancer diagnosed in 1980, status post laser surgery. PAST SURGICAL HISTORY:  Past surgical history is significant for the following. 1. Cholecystectomy in 1998.  2. Tonsillectomy and appendectomy. 3. Tubal ligation in 1981. 4. Cheloid removal in October 2002. FAMILY HISTORY:  Family history is significant for small cell lung cancer in her mother. No other family history of cancer disease. SOCIAL HISTORY:  She denies smoking, alcohol drinking, and illicit drug abuse. She is  for 11 years and has two children. She is currently working at [employer]. ALLERGIES:  She claimed to have allergies to aspirin and iron. Review of Systems: \"Per interval history; otherwise 10 point ROS is negative. \"   Her energy level is stable, appetite, and sleep are good.  She denies fever, chills, night sweats, cough, shortness of breath, chest pain, hemoptysis, or palpitations. Her bowel and bladder functions are normal. She denies nausea, vomiting, abdominal pain, diarrhea, constipation, dysuria, loss of appetite, or weight loss. She doesn't have neuropathy and she denies bleeding or clotting issues. She doesn't have any pain in her body. Denies anxiety or depression. The rest of the systems are unremarkable. Vital Signs:  /62 (Site: Right Upper Arm, Position: Sitting, Cuff Size: Medium Adult)   Pulse 65   Temp 97.4 °F (36.3 °C) (Infrared)   Resp 18   Ht 5' 6\" (1.676 m)   Wt 224 lb (101.6 kg)   SpO2 99%   BMI 36.15 kg/m²     Physical Exam:  CONSTITUTIONAL: awake, alert, cooperative, no apparent distress   EYES: pupils equal, round and reactive to light, sclera clear and conjunctiva normal  ENT: Normocephalic, without obvious abnormality, atraumatic  NECK: supple, symmetrical, no jugular venous distension and no carotid bruits   HEMATOLOGIC/LYMPHATIC: no cervical, supraclavicular or axillary lymphadenopathy   LUNGS: VBS, no wheezes, no rhonchi, no increased work of breathing, no crackles, clear to auscultation,     CARDIOVASCULAR: regular rate and rhythm, normal S1 and S2, no murmur noted  ABDOMEN: normal bowel sounds x 4, non-tender, no masses palpated, no hepatosplenomegaly,  soft, non-distended,   MUSCULOSKELETAL: full range of motion noted, tone is normal  NEUROLOGIC: awake, alert, oriented to name, place and time. Motor skills grossly intact. SKIN: Normal skin color, texture, turgor and no jaundice.  appears intact   EXTREMITIES: no LE edema, no cyanosis, no clubbing, no leg swelling,       Labs:  Hematology:  Lab Results   Component Value Date    WBC 3.9 (L) 09/01/2021    RBC 3.52 (L) 09/01/2021    HGB 10.2 (L) 09/01/2021    HCT 31.1 (L) 09/01/2021    MCV 88.4 09/01/2021    MCH 29.0 09/01/2021    MCHC 32.8 09/01/2021    RDW 17.5 (H) 09/01/2021     (L) 09/01/2021    MPV 11.8 (H) 09/01/2021    BANDSPCT 5 03/08/2017    SEGSPCT 83.8 (H) 09/01/2021    EOSRELPCT 0.5 09/01/2021    BASOPCT 0.3 09/01/2021    LYMPHOPCT 10.8 (L) 09/01/2021    MONOPCT 4.6 (H) 09/01/2021    BANDABS 0.12 03/08/2017    SEGSABS 3.3 09/01/2021    EOSABS 0.0 09/01/2021    BASOSABS 0.0 09/01/2021    LYMPHSABS 0.4 09/01/2021    MONOSABS 0.2 09/01/2021    DIFFTYPE AUTOMATED DIFFERENTIAL 09/01/2021     Lab Results   Component Value Date    ESR 39 (H) 08/11/2021     Chemistry:  Lab Results   Component Value Date     09/01/2021    K 4.8 (H) 09/01/2021     08/11/2021    CO2 26 08/11/2021    BUN 24 (H) 08/11/2021    CREATININE 1.2 (H) 09/01/2021    GLUCOSE 127 (H) 08/11/2021    CALCIUM 8.3 08/11/2021    PROT 6.5 08/11/2021    PROT 6.5 08/11/2021    LABALBU 4.2 08/11/2021    BILITOT 0.6 08/11/2021    ALKPHOS 91 08/11/2021    AST 14 (L) 08/11/2021    ALT 16 08/11/2021    LABGLOM 43 (L) 09/01/2021    GFRAA 52 (L) 09/01/2021    POCCA 1.19 09/01/2021    POCGLU 174 (H) 09/01/2021     Lab Results   Component Value Date     08/11/2021     No components found for: LD  Lab Results   Component Value Date    TSHHS 2.340 10/31/2018     Immunology:  Lab Results   Component Value Date    PROT 6.5 08/11/2021    PROT 6.5 08/11/2021    SPEP INTERPRETATION - Within normal limits. RS 08/11/2021    SPEP INTERPRETATION - Within normal limits.  RS 08/11/2021    ALBUMINELP 3.3 08/11/2021    LABALPH 0.4 08/11/2021    LABALPH 0.9 08/11/2021    LABBETA 1.1 08/11/2021    GAMGLOB 0.8 08/11/2021     Lab Results   Component Value Date    KAPPAUVOL 25.32 (H) 08/11/2021    LAMBDAUVOL 67.21 (H) 11/11/2020    KLFLCR 1.58 08/11/2021     Lab Results   Component Value Date    B2M 2.8 (H) 08/11/2021     Coagulation Panel:  Lab Results   Component Value Date    PROTIME 12.8 (H) 10/31/2018    INR 1.12 10/31/2018    APTT 29.7 10/31/2018    DDIMER <200 05/17/2017     Anemia Panel:  Lab Results   Component Value Date    FINIEKIM83 228.6 05/17/2017    FOLATE >20.0 (H) 05/17/2017     Tumor Markers:  No results found for: , CEA, , LABCA2, PSA  Observations:  No data recorded        Assessment & Plan:   Right tibial intramedullary lesion and upper sternum lesion - with multiple lytic bone lesions - biopsy is consistent with multiple myeloma - s/p chemotherapy with Velcade, Revlimid and Dexamentasone - currently on maintenance Melphalan and prednisone. PLAN:  Ms. Hilario Richard has been followed for multiple myeloma. Since she is found to have recurrent multiple myeloma, we started chemotherapy with Velcade, Revlimid and dexamethasone since July 7, 2021. Zoledronic acid was started on July 7, 2021 and we will continue to give it every 12 week intervals. On September 1, 2021, she presented to me for followup. I have been following Ms. Bar for multiple myeloma. She was on maintenance chemotherapy until May 7, 2017, and we stopped it after that since she could not tolerate maintenance chemotherapy because of significant myelosuppression. She was placed on under close observation. She noticed increase in size of her upper sternum lesion. It was decreased and back to normal size after chemo in 2016. I recognize that her serum lambda light chain has gradually increased over time. In view of her enlarging upper sternum lesion along with increasing lambda light chain, I believe she has recurrent multiple myeloma. I offered her to have repeat scans and bone marrow biopsy to confirm progression of the disease. However, she does not want to have repeat scans because it exacerbates her vitiligo. She also does not want to have biopsy at this moment. However, she agrees to start chemotherapy with Velcade, Revlimid and dexamethasone. Since her laboratory work-ups and clinical findings are consistent with recurrent multiple myeloma, we started first-line chemotherapy on July 7, 2021.  Since she has been in remission for more than 4 years, we decided to rechallenge with velcade, revlimid and dexamethason. She will receive her cycle 3, day 15 velcade today. She is tolerating current chemotherapy quite well except she has been having significant diarrhea from revlimid. She has significant diarrhea from Revlimid, I will decrease the dose of Revlimid to 10 mg [14 days on 7 days of] starting from her fourth cycle. I recognize that her upper sternal mass is decreasing in size. I also recognize that her serum lambda light chain is back to normal after first cycle chemotherapy. I believe she is achieving response to current chemotherapy. I will repeat multiple myeloma panel again on September 8, 2021 after third cycle of chemotherapy. Edema of lower legs - Decrease the Methasone dose to 2 mg weekly. I will also start Lasix 20 mg daily as needed basis. I will continue to monitor her edema closely. Revlimid induced thrombosis prophylaxis - I recommend her to continue with aspirin 81 mg daily. Herpes zoster prophylaxis - I recommend her to continue with acyclovir 400 mg twice daily. I also recommend to continue with zoledronic acid every twelve month intervals and her next treatment will be on September 29, 2021. For her hypertension, I recommend to continue with metoprolol 50 mg daily for now. Her blood pressure reading on today is normal. I also recommend her to continue with vitamin B12 supplementation. Health maintenance - I asked her to have age-appropriate cancer screening, exercise, low-fat and low-sodium diet. She said she is not ready to take covid vaccine at this moment. I answered all her questions and concerns for today. I asked her to follow up with primary care physician on regular basis. Recent imaging and labs were reviewed and discussed with the patient.

## 2021-09-07 ENCOUNTER — TELEPHONE (OUTPATIENT)
Dept: ONCOLOGY | Age: 71
End: 2021-09-07

## 2021-09-07 NOTE — TELEPHONE ENCOUNTER
Returned call to patient. Patient reports fatigue and weakness. Patient states that she is drinking fluids but has a poor appetite because \"everything tastes like cardboard\". Discussed with Dr Amelia Beltran. Patient is scheduled tomorrow for labs and Velcade. Informed patient that her vital signs will be addressed tomorrow and that Dr Amelia Beltran stated that he may decrease her lopressor, but would like to assess her vital signs and lab values to determine if there is an additional cause for her symptoms. Patient verbalized understanding of plan of care.   Will communicate patient's symptoms to treatment suite lead in the am.

## 2021-09-07 NOTE — TELEPHONE ENCOUNTER
Patient called c/o being very fatigue, had to call off work today b/c she has no energy at all, thought maybe it was her blood pressure medicine and was wondering  about Dr Lino Storey decreasing it, please call

## 2021-09-08 ENCOUNTER — HOSPITAL ENCOUNTER (OUTPATIENT)
Dept: INFUSION THERAPY | Age: 71
Discharge: HOME | End: 2021-09-08
Payer: MEDICARE

## 2021-09-08 ENCOUNTER — CLINICAL DOCUMENTATION (OUTPATIENT)
Dept: ONCOLOGY | Age: 71
End: 2021-09-08

## 2021-09-08 VITALS
OXYGEN SATURATION: 98 % | BODY MASS INDEX: 34.94 KG/M2 | RESPIRATION RATE: 18 BRPM | HEIGHT: 66 IN | DIASTOLIC BLOOD PRESSURE: 66 MMHG | TEMPERATURE: 98.5 F | WEIGHT: 217.4 LBS | SYSTOLIC BLOOD PRESSURE: 144 MMHG | HEART RATE: 64 BPM

## 2021-09-08 DIAGNOSIS — C90.00 MULTIPLE MYELOMA NOT HAVING ACHIEVED REMISSION (HCC): Primary | ICD-10-CM

## 2021-09-08 LAB
ALBUMIN SERPL-MCNC: 3.5 GM/DL (ref 3.4–5)
ALP BLD-CCNC: 110 IU/L (ref 40–128)
ALT SERPL-CCNC: 16 U/L (ref 10–40)
ANION GAP SERPL CALCULATED.3IONS-SCNC: 13 MMOL/L (ref 4–16)
AST SERPL-CCNC: 15 IU/L (ref 15–37)
BASOPHILS ABSOLUTE: 0 K/CU MM
BASOPHILS RELATIVE PERCENT: 0.5 % (ref 0–1)
BILIRUB SERPL-MCNC: 0.3 MG/DL (ref 0–1)
BUN BLDV-MCNC: 20 MG/DL (ref 6–23)
CALCIUM SERPL-MCNC: 8.6 MG/DL (ref 8.3–10.6)
CHLORIDE BLD-SCNC: 102 MMOL/L (ref 99–110)
CO2: 23 MMOL/L (ref 21–32)
CREAT SERPL-MCNC: 0.9 MG/DL (ref 0.6–1.1)
DIFFERENTIAL TYPE: ABNORMAL
EOSINOPHILS ABSOLUTE: 0 K/CU MM
EOSINOPHILS RELATIVE PERCENT: 0.2 % (ref 0–3)
GFR AFRICAN AMERICAN: >60 ML/MIN/1.73M2
GFR NON-AFRICAN AMERICAN: >60 ML/MIN/1.73M2
GLUCOSE BLD-MCNC: 137 MG/DL (ref 70–99)
HCT VFR BLD CALC: 30.8 % (ref 37–47)
HEMOGLOBIN: 10 GM/DL (ref 12.5–16)
LYMPHOCYTES ABSOLUTE: 0.5 K/CU MM
LYMPHOCYTES RELATIVE PERCENT: 12 % (ref 24–44)
MCH RBC QN AUTO: 29.2 PG (ref 27–31)
MCHC RBC AUTO-ENTMCNC: 32.5 % (ref 32–36)
MCV RBC AUTO: 90.1 FL (ref 78–100)
MONOCYTES ABSOLUTE: 0.2 K/CU MM
MONOCYTES RELATIVE PERCENT: 3.6 % (ref 0–4)
PDW BLD-RTO: 17.2 % (ref 11.7–14.9)
PLATELET # BLD: 222 K/CU MM (ref 140–440)
PMV BLD AUTO: 10 FL (ref 7.5–11.1)
POTASSIUM SERPL-SCNC: 5.2 MMOL/L (ref 3.5–5.1)
RBC # BLD: 3.42 M/CU MM (ref 4.2–5.4)
SEGMENTED NEUTROPHILS ABSOLUTE COUNT: 3.5 K/CU MM
SEGMENTED NEUTROPHILS RELATIVE PERCENT: 83.7 % (ref 36–66)
SODIUM BLD-SCNC: 138 MMOL/L (ref 135–145)
TOTAL PROTEIN: 6 GM/DL (ref 6.4–8.2)
WBC # BLD: 4.2 K/CU MM (ref 4–10.5)

## 2021-09-08 PROCEDURE — 6360000002 HC RX W HCPCS: Performed by: INTERNAL MEDICINE

## 2021-09-08 PROCEDURE — 80053 COMPREHEN METABOLIC PANEL: CPT

## 2021-09-08 PROCEDURE — 2580000003 HC RX 258: Performed by: INTERNAL MEDICINE

## 2021-09-08 PROCEDURE — 36415 COLL VENOUS BLD VENIPUNCTURE: CPT

## 2021-09-08 PROCEDURE — 85025 COMPLETE CBC W/AUTO DIFF WBC: CPT

## 2021-09-08 PROCEDURE — 96402 CHEMO HORMON ANTINEOPL SQ/IM: CPT

## 2021-09-08 RX ORDER — SODIUM CHLORIDE 9 MG/ML
20 INJECTION, SOLUTION INTRAVENOUS ONCE
Status: DISCONTINUED | OUTPATIENT
Start: 2021-09-08 | End: 2021-09-09 | Stop reason: HOSPADM

## 2021-09-08 RX ORDER — METOPROLOL SUCCINATE 25 MG/1
25 TABLET, EXTENDED RELEASE ORAL DAILY
Qty: 30 TABLET | Refills: 3 | Status: SHIPPED | OUTPATIENT
Start: 2021-09-08 | End: 2021-09-22 | Stop reason: SDUPTHER

## 2021-09-08 RX ADMIN — BORTEZOMIB 2.88 MG: 3.5 INJECTION, POWDER, LYOPHILIZED, FOR SOLUTION INTRAVENOUS; SUBCUTANEOUS at 12:15

## 2021-09-08 NOTE — PROGRESS NOTES
Patient here for Velcade. Dr Kassie Mortimer informed of patient's lab results and vital signs today. B/P 144/66. Dr Kassie Mortimer states that patient can decrease lopressor to 25 mg daily. Rx sent to pharmacy.

## 2021-09-08 NOTE — PROGRESS NOTES
Ambulated to infusion area after labs. Here for treatment. CBC results reviewed with patient. [employer] RN NN at chairside talking with patient. Velcade administered as ordered. Tolerated well. Discharged in stable condition. Patient's status assessed and documented appropriately. All labs and required results were also reviewed today. Treatment parameters have been reviewed. Today's treatment has been approved by the provider. Treatment orders and medication sequencing (when applicable) was verified by 2 registered nurses. The treatment plan was confirmed with the patient prior to administration, and the patient understands the need to report any treatment-related symptoms. Prior to administration, when applicable, the following 8 elements of medication administration were reviewed with 2nd Registered Nurse prior to dosing: drug name, drug dose, infusion volume when prepared in a syringe, rate of administration, expiration dates and/or times, appearance and integrity of drug(s), and rate of pump for infusion. The 5 rights of medication administration have been verified.

## 2021-09-13 ENCOUNTER — TELEPHONE (OUTPATIENT)
Dept: ONCOLOGY | Age: 71
End: 2021-09-13

## 2021-09-13 DIAGNOSIS — C90.00 MULTIPLE MYELOMA NOT HAVING ACHIEVED REMISSION (HCC): Primary | ICD-10-CM

## 2021-09-13 NOTE — TELEPHONE ENCOUNTER
Returned call to patient after speaking to Dr Lino Storey. Patient called with vision issues and was concerned that it is a side effect of Velcade or Revlimid. Dr Lino Storey stated that patient should not receive Velcade injection on 09/15/21 and he will see patient for scheduled OV on 09/22/21 to evaluate. OV scheduled on 09/22/21 and informed patient to arrive at 1100.  informed and will place 09/15/521 Velcade appointment on hold. Patient instructed to call if she has increased visual changes or other symptoms. Patient verbalized understanding of plan of care and confirmed appointment.

## 2021-09-13 NOTE — TELEPHONE ENCOUNTER
Patient called states she has had some vision disturbance once in a while, like when your TV cuts out when we are having a bad storm is how she explained but no blurred vision, she didn't know if this was from her chemo meds or if it was her blood suger, please call

## 2021-09-15 ENCOUNTER — TELEPHONE (OUTPATIENT)
Dept: ONCOLOGY | Age: 71
End: 2021-09-15

## 2021-09-15 NOTE — TELEPHONE ENCOUNTER
Spoke with pt to review her request to take a low dose of ibuprofen combined with a low dose of tylenol. Explained this is ok as long as she is only taking a small amount occasionally. Pt expressed understanding.

## 2021-09-15 NOTE — TELEPHONE ENCOUNTER
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

## 2021-09-20 ENCOUNTER — TELEPHONE (OUTPATIENT)
Dept: ONCOLOGY | Age: 71
End: 2021-09-20

## 2021-09-20 NOTE — TELEPHONE ENCOUNTER
Pt called and left message about changing her BP medication. NN returned call to pt and left message that BP medication should be adjusted based on BP and that pt can discuss this with Dr Luis A Darby when she comes in on Wednesday for her OV. Requested return call if pt has more questions or needs to discuss.

## 2021-09-21 NOTE — PROGRESS NOTES
Patient Name: Nathaniel Jackson  Patient : 1950  Patient MRN: [id number]     Primary Oncologist: Lisa Álvarez MD  Referring Provider: Seda Bey MD     Date of Service: 2021      Chief Complaint:   Chief Complaint   Patient presents with    Follow-up     Patient Active Problem List:     Multiple myeloma not having achieved remission     HPI:   Ms. Tanika Steele is a 82-year-old very pleasant patient with a medical history significant for stage II cervical cancer diagnosed in , status post laser surgery, gastroesophageal reflux disease, initially referred to me on 2014 for evaluation of right tibial intramedullary lesion. She stated that she has been having right leg pain since 2014, which has gradually been getting worse over time. She was seen by her primary care physician and had x-ray on 2014. It showed abnormal lucency within the tibial shaft at the junction of the mid and proximal third. MRI of the right lower extremity was done on 2014, and it showed marrow replacing 5.8 cm intramedullary lesion in the right mid tibial shaft with cortical breakthrough and adjacent periostitis. This corresponds to the lytic/lucent lesion on the recent x-ray. Differential considerations include lytic metastatic disease or multiple myeloma. She was subsequently referred to me for evaluation. She had upper sternum tumor for the last four years, which is about 8 by 10 cm in diameter. She otherwise does not have any other significant symptoms. 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 It is too small to measure the quantity. Her ESR was mildly elevated to 49.       Laboratory results done on September 3, 2014, showed normal CBC, mild elevation in serum creatinine (1.3), normal calcium (9.8), normal total protein (7.4), beta-2 microglobulin (4.2), triclonal gammopathy (2 IgA Lambda and 1 free Lambda light chain) on serum protein electrophoresis, two of which are large enough to measure on serum protein electrophoresis and which together total 300 mg/dl, ESR (60). Quantitative immunoglobulin IgA was elevated (760), IgM (41 mg/dl), and IgG (926). She also has 3.4 grams of protein in 23 hour urine and urine immunoelectrophoresis is consistent with monoclonal free Lambda light chains. She had biopsy of the right tibial lesions and sternal lesions on August 29, 2014 and final pathology was consistent with plasma cell myeloma. Flow cytometry was consistent with 23 percent of CD56 positive clonal plasma cell population, consistent with plasma cell neoplasms. FISH study showed \"Abnormal Myeloma FISH Profile. Monosomy for chromosome 13. Aneuploid population: Gain of chromosomes 15 and FGFR3/4p\". Cytogenetics wasn't performed by laboratory. Complete bone survey was done on September 3, 2014, and it showed multiple lucent lesions involving the visualized bony skeleton concerning for metastatic disease. This involves the skull, T10 vertebral body, left humerus, and right femur. Faint opacity overlying the left upper lobe could be related to atelectasis, mass, and/or infiltrate. Followup chest radiograph is recommended to assure stability/resolution. The lungs are under aerated, which limits evaluation. I believe faint opacity in the left upper lobe is most likely due to upper sternal lesion. Chemotherapy with Velcade, Revlimid and dexamethasone was started on September 16, 2014. Palliative radiation therapy to the right tibia lesion was also started by Dr. Leslee Sandhu on September 29, 2014 and she completed it on October 10, 2014.     Her serum free Lambda light chain on November 4, 2014, after the second cycle of chemotherapy was 1.25 (normal), after the first cycle of chemotherapy it was 4.32 (October 1, 2014), and decreased from pretreatment value of 141 on September 3, 2014. Her 24-hour urine protein has decreased to 90 mg per 24 hour on November 25, 2014, after third cycle of chemotherapy. It has decreased from 3.4 grams before chemotherapy. Ms. Kimi Lundy completed her eight cycle of chemotherapy with Velcade, Revlimid and dexamethasone on February 27, 2015. Maintenance chemotherapy with Velcade was started on March 17, 2015. Since Ms. Kimi Lundy developed significant neuropathy from maintenance chemotherapy with Velcade, we stopped Velcade on July 19, 2016. Since she has minimal residual disease and she is not considering stem-cell transplantation, I recommend to start melphalan and dexamethasone as a maintenance chemotherapy. It was started since August 11, 2016 and we stopped it on November 18, 2016, since melphalan is not available as of November 16, 2016. Since we could not get melphalan, we changed her chemotherapy to Revlimid and dexamethasone. She has been on Revlimid until January 18, 2017. I stopped the Revlimid since she could not tolerate Revlimid and dexamethasone very well. She also developed right lower extremity deep vein thrombosis secondary to the Revlimid. Anticoagulation therapy with Eliquis was started since January 18, 2017. We resumed her back on melphalan and prednisone since January 18, 2017. Eliquis was stopped on May 17, 2017, since she has been on Eliquis for about four months duration. Her D-dimer level done on May 17, 2017, was also within the normal range. Ms. Kimi Lundy was found to have leukopenia, anemia, and thrombocytopenia on a blood test done on May 17, 2017, and I believe it is due to chemotherapy. Her chemotherapy was held since May 17, 2017. Since she is found to have recurrent multiple myeloma, we started chemotherapy with Velcade, Revlimid and dexamethasone since July 7, 2021.   Zoledronic acid was started on July 7, 2021 and we will continue to give it every 12 week intervals. We stopped velcade after 9/8/2021 therapy since she could not tolerate it well. We decided to continue with revlimid and dexamethasone only since then. On September 22, 2021, she presented to me for followup. I have been following Ms. Bar for multiple myeloma. She was on maintenance chemotherapy until May 7, 2017, and we stopped it after that since she could not tolerate maintenance chemotherapy because of significant myelosuppression. She was placed on under close observation. She noticed increase in size of her upper sternum lesion. It was decreased and back to normal size after chemo in 2016. I recognize that her serum lambda light chain has gradually increased over time. In view of her enlarging upper sternum lesion along with increasing lambda light chain, I believe she has recurrent multiple myeloma. I offered her to have repeat scans and bone marrow biopsy to confirm progression of the disease. However, she does not want to have repeat scans because it exacerbates her vitiligo. She also does not want to have biopsy at this moment. However, she agrees to start chemotherapy with Velcade, Revlimid and dexamethasone. Since her laboratory work-ups and clinical findings are consistent with recurrent multiple myeloma, we started first-line chemotherapy on July 7, 2021. Since she has been in remission for more than 4 years, we decided to rechallenge with velcade, revlimid and dexamethason. We stopped velcade after 9/8/2021 therapy since she could not tolerate it well. We decided to continue with revlimid and dexamethasone only since then. I recognize that her upper sternal mass is decreasing in size. I also recognize that her serum lambda light chain is back to normal after first cycle chemotherapy. I believe she is achieving response to current chemotherapy.       I will repeat multiple myeloma panel again today and I will follow up with the results. Edema of lower legs - Decrease the Methasone dose to 2 mg weekly. I will also start Lasix 20 mg daily as needed basis. I will continue to monitor her edema closely. Revlimid induced thrombosis prophylaxis - I recommend her to continue with aspirin 81 mg daily. Herpes zoster prophylaxis - I recommend her to continue with acyclovir 400 mg twice daily. I also recommend to continue with zoledronic acid every twelve month intervals and her next treatment will be on September 29, 2021. For her hypertension, I recommend to continue with metoprolol 50 mg daily for now. Her blood pressure reading on today is normal. I also recommend her to continue with vitamin B12 supplementation. Health maintenance - I asked her to have age-appropriate cancer screening, exercise, low-fat and low-sodium diet. She said she is not ready to take covid vaccine at this moment. I will continue to monitor her blood pressure closely. PAST MEDICAL HISTORY:  Past medical history is significant for the following. 1. Gastroesophageal reflux disease. 2. Stage II cervical cancer diagnosed in 1980, status post laser surgery. PAST SURGICAL HISTORY:  Past surgical history is significant for the following. 1. Cholecystectomy in 1998.  2. Tonsillectomy and appendectomy. 3. Tubal ligation in 1981. 4. Cheloid removal in October 2002. FAMILY HISTORY:  Family history is significant for small cell lung cancer in her mother. No other family history of cancer disease. SOCIAL HISTORY:  She denies smoking, alcohol drinking, and illicit drug abuse. She is  for 11 years and has two children. She is currently working at [employer]. ALLERGIES:  She claimed to have allergies to aspirin and iron. Review of Systems: \"Per interval history; otherwise 10 point ROS is negative. \"   Her energy level is okay, appetite, and sleep are stable.  She doesn't have fever, chills, night sweats, cough, shortness of breath, chest pain, hemoptysis, or palpitations. Her bowel and bladder functions are normal. She doesn't have nausea, vomiting, abdominal pain, diarrhea, constipation, dysuria, loss of appetite, or weight loss. She denies neuropathy and she doesn't have bleeding or clotting issues. She denies any pain in her body. No anxiety or depression. The rest of the systems are unremarkable. Vital Signs:  BP (!) 127/56 (Site: Right Lower Arm, Position: Sitting, Cuff Size: Large Adult)   Pulse 72   Temp 97.1 °F (36.2 °C) (Temporal)   Resp 18   Ht 5' 6\" (1.676 m)   Wt 212 lb 6.4 oz (96.3 kg)   SpO2 98%   BMI 34.28 kg/m²     Physical Exam:  CONSTITUTIONAL: awake, alert, cooperative, no apparent distress   EYES: pupils equal, round and reactive to light, sclera clear and conjunctiva normal  ENT: Normocephalic, without obvious abnormality, atraumatic  NECK: supple, symmetrical, no jugular venous distension and no carotid bruits   HEMATOLOGIC/LYMPHATIC: no cervical, supraclavicular or axillary lymphadenopathy   LUNGS: VBS, no wheezes, no crackles, clear to auscultation, no rhonchi, no increased work of breathing,     CARDIOVASCULAR: regular rate and rhythm, normal S1 and S2, no murmur noted  ABDOMEN: normal bowel sounds x 4, non-tender, no masses palpated, no hepatosplenomegaly,  soft, non-distended,   MUSCULOSKELETAL: full range of motion noted, tone is normal  NEUROLOGIC: awake, alert, oriented to name, place and time. Motor skills grossly intact. SKIN: Normal skin color, texture, turgor and no jaundice.  appears intact   EXTREMITIES: no cyanosis, no clubbing, no LE edema, no leg swelling,       Labs:  Hematology:  Lab Results   Component Value Date    WBC 3.1 (L) 09/22/2021    RBC 3.05 (L) 09/22/2021    HGB 8.8 (L) 09/22/2021    HCT 27.1 (L) 09/22/2021    MCV 88.9 09/22/2021    MCH 28.9 09/22/2021    MCHC 32.5 09/22/2021    RDW 17.7 (H) 09/22/2021     09/22/2021    MPV 11.3 (H) for: , CEA, , LABCA2, PSA  Observations:  No data recorded        Assessment & Plan:   Right tibial intramedullary lesion and upper sternum lesion - with multiple lytic bone lesions - biopsy is consistent with multiple myeloma - s/p chemotherapy with Velcade, Revlimid and Dexamentasone - currently on maintenance Melphalan and prednisone. PLAN:  Ms. Leeann Bruno has been followed for multiple myeloma. Since she is found to have recurrent multiple myeloma, we started chemotherapy with Velcade, Revlimid and dexamethasone since July 7, 2021. Zoledronic acid was started on July 7, 2021 and we will continue to give it every 12 week intervals. On September 22, 2021, she presented to me for followup. I have been following Ms. Bar for multiple myeloma. She was on maintenance chemotherapy until May 7, 2017, and we stopped it after that since she could not tolerate maintenance chemotherapy because of significant myelosuppression. She was placed on under close observation. She noticed increase in size of her upper sternum lesion. It was decreased and back to normal size after chemo in 2016. I recognize that her serum lambda light chain has gradually increased over time. In view of her enlarging upper sternum lesion along with increasing lambda light chain, I believe she has recurrent multiple myeloma. I offered her to have repeat scans and bone marrow biopsy to confirm progression of the disease. However, she does not want to have repeat scans because it exacerbates her vitiligo. She also does not want to have biopsy at this moment. However, she agrees to start chemotherapy with Velcade, Revlimid and dexamethasone. Since her laboratory work-ups and clinical findings are consistent with recurrent multiple myeloma, we started first-line chemotherapy on July 7, 2021. Since she has been in remission for more than 4 years, we decided to rechallenge with velcade, revlimid and dexamethason. We stopped velcade after 9/8/2021 therapy since she could not tolerate it well. We decided to continue with revlimid and dexamethasone only since then. I recognize that her upper sternal mass is decreasing in size. I also recognize that her serum lambda light chain is back to normal after first cycle chemotherapy. I believe she is achieving response to current chemotherapy. I will repeat multiple myeloma panel again today and I will follow up with the results. Edema of lower legs - Decrease the Methasone dose to 2 mg weekly. I will also start Lasix 20 mg daily as needed basis. I will continue to monitor her edema closely. Revlimid induced thrombosis prophylaxis - I recommend her to continue with aspirin 81 mg daily. Herpes zoster prophylaxis - I recommend her to continue with acyclovir 400 mg twice daily. I also recommend to continue with zoledronic acid every twelve month intervals and her next treatment will be on September 29, 2021. For her hypertension, I recommend to continue with metoprolol 50 mg daily for now. Her blood pressure reading on today is normal. I also recommend her to continue with vitamin B12 supplementation. Health maintenance - I asked her to have age-appropriate cancer screening, exercise, low-fat and low-sodium diet. She said she is not ready to take covid vaccine at this moment. I answered all her questions and concerns for today. I asked her to follow up with primary care physician on regular basis. Recent imaging and labs were reviewed and discussed with the patient.

## 2021-09-22 ENCOUNTER — HOSPITAL ENCOUNTER (OUTPATIENT)
Dept: INFUSION THERAPY | Age: 71
Discharge: HOME | End: 2021-09-22
Payer: MEDICARE

## 2021-09-22 ENCOUNTER — OFFICE VISIT (OUTPATIENT)
Dept: ONCOLOGY | Age: 71
End: 2021-09-22
Payer: MEDICARE

## 2021-09-22 VITALS
RESPIRATION RATE: 18 BRPM | OXYGEN SATURATION: 98 % | BODY MASS INDEX: 34.13 KG/M2 | SYSTOLIC BLOOD PRESSURE: 127 MMHG | DIASTOLIC BLOOD PRESSURE: 56 MMHG | HEIGHT: 66 IN | WEIGHT: 212.4 LBS | HEART RATE: 72 BPM | TEMPERATURE: 97.1 F

## 2021-09-22 DIAGNOSIS — C90.00 MULTIPLE MYELOMA NOT HAVING ACHIEVED REMISSION (HCC): ICD-10-CM

## 2021-09-22 DIAGNOSIS — C90.00 MULTIPLE MYELOMA NOT HAVING ACHIEVED REMISSION (HCC): Primary | ICD-10-CM

## 2021-09-22 LAB
ALBUMIN SERPL-MCNC: 3.6 GM/DL (ref 3.4–5)
ALP BLD-CCNC: 114 IU/L (ref 40–129)
ALT SERPL-CCNC: 23 U/L (ref 10–40)
ANION GAP SERPL CALCULATED.3IONS-SCNC: 16 MMOL/L (ref 4–16)
AST SERPL-CCNC: 16 IU/L (ref 15–37)
BASOPHILS ABSOLUTE: 0 K/CU MM
BASOPHILS RELATIVE PERCENT: 0.3 % (ref 0–1)
BILIRUB SERPL-MCNC: 0.3 MG/DL (ref 0–1)
BUN BLDV-MCNC: 42 MG/DL (ref 6–23)
CALCIUM SERPL-MCNC: 8.2 MG/DL (ref 8.3–10.6)
CHLORIDE BLD-SCNC: 101 MMOL/L (ref 99–110)
CO2: 21 MMOL/L (ref 21–32)
CREAT SERPL-MCNC: 1.3 MG/DL (ref 0.6–1.1)
DIFFERENTIAL TYPE: ABNORMAL
EOSINOPHILS ABSOLUTE: 0 K/CU MM
EOSINOPHILS RELATIVE PERCENT: 0.6 % (ref 0–3)
ERYTHROCYTE SEDIMENTATION RATE: 82 MM/HR (ref 0–30)
GFR AFRICAN AMERICAN: 49 ML/MIN/1.73M2
GFR NON-AFRICAN AMERICAN: 41 ML/MIN/1.73M2
GLUCOSE BLD-MCNC: 132 MG/DL (ref 70–99)
HCT VFR BLD CALC: 27.1 % (ref 37–47)
HEMOGLOBIN: 8.8 GM/DL (ref 12.5–16)
IGA: 151 MG/DL (ref 69–382)
IGG,SERUM: 712 MG/DL (ref 723–1685)
IGM,SERUM: 35 MG/DL (ref 62–277)
LACTATE DEHYDROGENASE: 215 IU/L (ref 120–246)
LYMPHOCYTES ABSOLUTE: 0.6 K/CU MM
LYMPHOCYTES RELATIVE PERCENT: 19.6 % (ref 24–44)
MCH RBC QN AUTO: 28.9 PG (ref 27–31)
MCHC RBC AUTO-ENTMCNC: 32.5 % (ref 32–36)
MCV RBC AUTO: 88.9 FL (ref 78–100)
MONOCYTES ABSOLUTE: 0.1 K/CU MM
MONOCYTES RELATIVE PERCENT: 2.6 % (ref 0–4)
PDW BLD-RTO: 17.7 % (ref 11.7–14.9)
PLATELET # BLD: 196 K/CU MM (ref 140–440)
PMV BLD AUTO: 11.3 FL (ref 7.5–11.1)
POTASSIUM SERPL-SCNC: 4.7 MMOL/L (ref 3.5–5.1)
RBC # BLD: 3.05 M/CU MM (ref 4.2–5.4)
SEGMENTED NEUTROPHILS ABSOLUTE COUNT: 2.4 K/CU MM
SEGMENTED NEUTROPHILS RELATIVE PERCENT: 76.9 % (ref 36–66)
SODIUM BLD-SCNC: 138 MMOL/L (ref 135–145)
TOTAL PROTEIN: 6 GM/DL (ref 6.4–8.2)
WBC # BLD: 3.1 K/CU MM (ref 4–10.5)

## 2021-09-22 PROCEDURE — 4040F PNEUMOC VAC/ADMIN/RCVD: CPT | Performed by: INTERNAL MEDICINE

## 2021-09-22 PROCEDURE — 1090F PRES/ABSN URINE INCON ASSESS: CPT | Performed by: INTERNAL MEDICINE

## 2021-09-22 PROCEDURE — 80053 COMPREHEN METABOLIC PANEL: CPT

## 2021-09-22 PROCEDURE — 83615 LACTATE (LD) (LDH) ENZYME: CPT

## 2021-09-22 PROCEDURE — G8417 CALC BMI ABV UP PARAM F/U: HCPCS | Performed by: INTERNAL MEDICINE

## 2021-09-22 PROCEDURE — 85025 COMPLETE CBC W/AUTO DIFF WBC: CPT

## 2021-09-22 PROCEDURE — 83883 ASSAY NEPHELOMETRY NOT SPEC: CPT

## 2021-09-22 PROCEDURE — 82232 ASSAY OF BETA-2 PROTEIN: CPT

## 2021-09-22 PROCEDURE — 1036F TOBACCO NON-USER: CPT | Performed by: INTERNAL MEDICINE

## 2021-09-22 PROCEDURE — 86320 SERUM IMMUNOELECTROPHORESIS: CPT

## 2021-09-22 PROCEDURE — 82784 ASSAY IGA/IGD/IGG/IGM EACH: CPT

## 2021-09-22 PROCEDURE — 85652 RBC SED RATE AUTOMATED: CPT

## 2021-09-22 PROCEDURE — 99211 OFF/OP EST MAY X REQ PHY/QHP: CPT

## 2021-09-22 PROCEDURE — 36415 COLL VENOUS BLD VENIPUNCTURE: CPT

## 2021-09-22 PROCEDURE — G8400 PT W/DXA NO RESULTS DOC: HCPCS | Performed by: INTERNAL MEDICINE

## 2021-09-22 PROCEDURE — 3017F COLORECTAL CA SCREEN DOC REV: CPT | Performed by: INTERNAL MEDICINE

## 2021-09-22 PROCEDURE — 84165 PROTEIN E-PHORESIS SERUM: CPT

## 2021-09-22 PROCEDURE — 1123F ACP DISCUSS/DSCN MKR DOCD: CPT | Performed by: INTERNAL MEDICINE

## 2021-09-22 PROCEDURE — G8427 DOCREV CUR MEDS BY ELIG CLIN: HCPCS | Performed by: INTERNAL MEDICINE

## 2021-09-22 PROCEDURE — 99214 OFFICE O/P EST MOD 30 MIN: CPT | Performed by: INTERNAL MEDICINE

## 2021-09-22 RX ORDER — METOPROLOL SUCCINATE 25 MG/1
25 TABLET, EXTENDED RELEASE ORAL DAILY
Qty: 90 TABLET | Refills: 5 | Status: SHIPPED | OUTPATIENT
Start: 2021-09-22 | End: 2022-04-13

## 2021-09-22 RX ORDER — LENALIDOMIDE 10 MG/1
CAPSULE ORAL
Qty: 21 CAPSULE | Refills: 0 | Status: SHIPPED | OUTPATIENT
Start: 2021-09-22 | End: 2021-10-13 | Stop reason: SDUPTHER

## 2021-09-22 NOTE — PROGRESS NOTES
MA Rooming Questions  Patient: Calos Mccabe  MRN: [id number]    Date: 9/22/2021        1. Do you have any new issues? yes - has some questions about stopping Velcade-          2. Do you need any refills on medications?    no    3. Have you had any imaging done since your last visit?   no    4. Have you been hospitalized or seen in the emergency room since your last visit here?   no    5. Did the patient have a depression screening completed today?  No    No data recorded     PHQ-9 Given to (if applicable):               PHQ-9 Score (if applicable):                     [] Positive     []  Negative              Does question #9 need addressed (if applicable)                     [] Yes    []  No               Dana Sanabria CMA

## 2021-09-24 LAB
ALBUMIN ELP: 2.8 GM/DL (ref 3.2–5.6)
ALPHA-1-GLOBULIN: 0.5 GM/DL (ref 0.1–0.4)
ALPHA-2-GLOBULIN: 1 GM/DL (ref 0.4–1.2)
BETA GLOBULIN: 1 GM/DL (ref 0.5–1.3)
BETA-2 MICROGLOBULIN: 7.2 MG/L (ref 1.1–2.4)
GAMMA GLOBULIN: 0.8 GM/DL (ref 0.5–1.6)
KAPPA QUANT FREE LIGHT CHAINS: 59.76 MG/L (ref 3.3–19.4)
KAPPA/LAMBDA FREE LIGHT CHAIN RATIO: 1.81 (ref 0.26–1.65)
LAMBDA FREE LIGHT CHAINS QNT: 33.08 MG/L (ref 5.71–26.3)
SPEP INTERPRETATION: ABNORMAL
SPEP INTERPRETATION: NORMAL
TOTAL PROTEIN: 6 GM/DL (ref 6.4–8.2)

## 2021-09-27 DIAGNOSIS — C90.00 MULTIPLE MYELOMA NOT HAVING ACHIEVED REMISSION (HCC): Primary | ICD-10-CM

## 2021-09-27 RX ORDER — DIPHENHYDRAMINE HYDROCHLORIDE 50 MG/ML
50 INJECTION INTRAMUSCULAR; INTRAVENOUS ONCE
Status: CANCELLED | OUTPATIENT
Start: 2021-09-29 | End: 2021-09-29

## 2021-09-27 RX ORDER — METHYLPREDNISOLONE SODIUM SUCCINATE 125 MG/2ML
125 INJECTION, POWDER, LYOPHILIZED, FOR SOLUTION INTRAMUSCULAR; INTRAVENOUS ONCE
Status: CANCELLED | OUTPATIENT
Start: 2021-09-29 | End: 2021-09-29

## 2021-09-27 RX ORDER — HEPARIN SODIUM (PORCINE) LOCK FLUSH IV SOLN 100 UNIT/ML 100 UNIT/ML
500 SOLUTION INTRAVENOUS PRN
Status: CANCELLED | OUTPATIENT
Start: 2021-09-29

## 2021-09-27 RX ORDER — SODIUM CHLORIDE 0.9 % (FLUSH) 0.9 %
5-40 SYRINGE (ML) INJECTION PRN
Status: CANCELLED | OUTPATIENT
Start: 2021-09-29

## 2021-09-27 RX ORDER — SODIUM CHLORIDE 9 MG/ML
25 INJECTION, SOLUTION INTRAVENOUS PRN
Status: CANCELLED | OUTPATIENT
Start: 2021-09-29

## 2021-09-27 RX ORDER — SODIUM CHLORIDE 9 MG/ML
INJECTION, SOLUTION INTRAVENOUS CONTINUOUS
Status: CANCELLED | OUTPATIENT
Start: 2021-09-29

## 2021-09-27 RX ORDER — SODIUM CHLORIDE 9 MG/ML
20 INJECTION, SOLUTION INTRAVENOUS ONCE
Status: CANCELLED | OUTPATIENT
Start: 2021-09-29 | End: 2021-09-29

## 2021-09-27 RX ORDER — EPINEPHRINE 1 MG/ML
0.3 INJECTION, SOLUTION, CONCENTRATE INTRAVENOUS PRN
Status: CANCELLED | OUTPATIENT
Start: 2021-09-29

## 2021-09-29 ENCOUNTER — HOSPITAL ENCOUNTER (OUTPATIENT)
Dept: INFUSION THERAPY | Age: 71
Discharge: HOME | End: 2021-09-29
Payer: MEDICARE

## 2021-09-29 VITALS
RESPIRATION RATE: 16 BRPM | SYSTOLIC BLOOD PRESSURE: 115 MMHG | HEART RATE: 61 BPM | TEMPERATURE: 96.7 F | WEIGHT: 213.6 LBS | OXYGEN SATURATION: 96 % | DIASTOLIC BLOOD PRESSURE: 53 MMHG | HEIGHT: 66 IN | BODY MASS INDEX: 34.33 KG/M2

## 2021-09-29 DIAGNOSIS — C90.00 MULTIPLE MYELOMA NOT HAVING ACHIEVED REMISSION (HCC): Primary | ICD-10-CM

## 2021-09-29 PROCEDURE — 2580000003 HC RX 258: Performed by: INTERNAL MEDICINE

## 2021-09-29 PROCEDURE — 96365 THER/PROPH/DIAG IV INF INIT: CPT

## 2021-09-29 PROCEDURE — 6360000002 HC RX W HCPCS: Performed by: INTERNAL MEDICINE

## 2021-09-29 RX ORDER — SODIUM CHLORIDE 0.9 % (FLUSH) 0.9 %
5-40 SYRINGE (ML) INJECTION PRN
Status: DISCONTINUED | OUTPATIENT
Start: 2021-09-29 | End: 2021-09-30 | Stop reason: HOSPADM

## 2021-09-29 RX ORDER — HEPARIN SODIUM (PORCINE) LOCK FLUSH IV SOLN 100 UNIT/ML 100 UNIT/ML
500 SOLUTION INTRAVENOUS PRN
Status: DISCONTINUED | OUTPATIENT
Start: 2021-09-29 | End: 2021-09-30 | Stop reason: HOSPADM

## 2021-09-29 RX ORDER — SODIUM CHLORIDE 9 MG/ML
20 INJECTION, SOLUTION INTRAVENOUS ONCE
Status: DISCONTINUED | OUTPATIENT
Start: 2021-09-29 | End: 2021-09-30 | Stop reason: HOSPADM

## 2021-09-29 RX ADMIN — SODIUM CHLORIDE 20 ML/HR: 9 INJECTION, SOLUTION INTRAVENOUS at 11:49

## 2021-09-29 RX ADMIN — ZOLEDRONIC ACID 3.3 MG: 4 INJECTION, SOLUTION, CONCENTRATE INTRAVENOUS at 11:50

## 2021-09-29 NOTE — PROGRESS NOTES
Pt. Here for zometa. Peripheral IV started without difficulty, good blood return noted. Treatment administered without difficulty.

## 2021-10-13 RX ORDER — LENALIDOMIDE 10 MG/1
CAPSULE ORAL
Qty: 21 CAPSULE | Refills: 0 | Status: SHIPPED | OUTPATIENT
Start: 2021-10-13 | End: 2021-11-09

## 2021-10-13 NOTE — PROGRESS NOTES
Patient Name: Alexis Yi  Patient : 1950  Patient MRN: [id number]     Primary Oncologist: Molly Vega MD  Referring Provider: Manpreet Alaniz MD     Date of Service: 10/20/2021      Chief Complaint:   Chief Complaint   Patient presents with    Follow-up     Patient Active Problem List:     Multiple myeloma not having achieved remission     HPI:   Ms. Aldo Navas is a 72-year-old very pleasant patient with a medical history significant for stage II cervical cancer diagnosed in , status post laser surgery, gastroesophageal reflux disease, initially referred to me on 2014 for evaluation of right tibial intramedullary lesion. She stated that she has been having right leg pain since 2014, which has gradually been getting worse over time. She was seen by her primary care physician and had x-ray on 2014. It showed abnormal lucency within the tibial shaft at the junction of the mid and proximal third. MRI of the right lower extremity was done on 2014, and it showed marrow replacing 5.8 cm intramedullary lesion in the right mid tibial shaft with cortical breakthrough and adjacent periostitis. This corresponds to the lytic/lucent lesion on the recent x-ray. Differential considerations include lytic metastatic disease or multiple myeloma. She was subsequently referred to me for evaluation. She had upper sternum tumor for the last four years, which is about 8 by 10 cm in diameter. She otherwise does not have any other significant symptoms. 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 It is too small to measure the quantity. Her ESR was mildly elevated to 49.       Laboratory results done on September 3, 2014, showed normal CBC, mild elevation in serum creatinine (1.3), normal calcium (9.8), normal total protein (7.4), beta-2 microglobulin (4.2), triclonal gammopathy (2 IgA Lambda and 1 free Lambda light chain) on serum protein electrophoresis, two of which are large enough to measure on serum protein electrophoresis and which together total 300 mg/dl, ESR (60). Quantitative immunoglobulin IgA was elevated (760), IgM (41 mg/dl), and IgG (926). She also has 3.4 grams of protein in 23 hour urine and urine immunoelectrophoresis is consistent with monoclonal free Lambda light chains. She had biopsy of the right tibial lesions and sternal lesions on August 29, 2014 and final pathology was consistent with plasma cell myeloma. Flow cytometry was consistent with 23 percent of CD56 positive clonal plasma cell population, consistent with plasma cell neoplasms. FISH study showed \"Abnormal Myeloma FISH Profile. Monosomy for chromosome 13. Aneuploid population: Gain of chromosomes 15 and FGFR3/4p\". Cytogenetics wasn't performed by laboratory. Complete bone survey was done on September 3, 2014, and it showed multiple lucent lesions involving the visualized bony skeleton concerning for metastatic disease. This involves the skull, T10 vertebral body, left humerus, and right femur. Faint opacity overlying the left upper lobe could be related to atelectasis, mass, and/or infiltrate. Followup chest radiograph is recommended to assure stability/resolution. The lungs are under aerated, which limits evaluation. I believe faint opacity in the left upper lobe is most likely due to upper sternal lesion. Chemotherapy with Velcade, Revlimid and dexamethasone was started on September 16, 2014. Palliative radiation therapy to the right tibia lesion was also started by Dr. Nico Jackson on September 29, 2014 and she completed it on October 10, 2014.     Her serum free Lambda light chain on November 4, 2014, after the second cycle of chemotherapy was 1.25 (normal), after the first cycle of chemotherapy it was 4.32 (October 1, 2014), and decreased from pretreatment value of 141 on September 3, 2014. Her 24-hour urine protein has decreased to 90 mg per 24 hour on November 25, 2014, after third cycle of chemotherapy. It has decreased from 3.4 grams before chemotherapy. Ms. Heather Humphrey completed her eight cycle of chemotherapy with Velcade, Revlimid and dexamethasone on February 27, 2015. Maintenance chemotherapy with Velcade was started on March 17, 2015. Since Ms. Heather Humphrey developed significant neuropathy from maintenance chemotherapy with Velcade, we stopped Velcade on July 19, 2016. Since she has minimal residual disease and she is not considering stem-cell transplantation, I recommend to start melphalan and dexamethasone as a maintenance chemotherapy. It was started since August 11, 2016 and we stopped it on November 18, 2016, since melphalan is not available as of November 16, 2016. Since we could not get melphalan, we changed her chemotherapy to Revlimid and dexamethasone. She has been on Revlimid until January 18, 2017. I stopped the Revlimid since she could not tolerate Revlimid and dexamethasone very well. She also developed right lower extremity deep vein thrombosis secondary to the Revlimid. Anticoagulation therapy with Eliquis was started since January 18, 2017. We resumed her back on melphalan and prednisone since January 18, 2017. Eliquis was stopped on May 17, 2017, since she has been on Eliquis for about four months duration. Her D-dimer level done on May 17, 2017, was also within the normal range. Ms. Heather Humphrey was found to have leukopenia, anemia, and thrombocytopenia on a blood test done on May 17, 2017, and I believe it is due to chemotherapy. Her chemotherapy was held since May 17, 2017. Since she is found to have recurrent multiple myeloma, we started chemotherapy with Velcade, Revlimid and dexamethasone since July 7, 2021.   Zoledronic acid was started on July 7, 2021 and we will continue to give it every 12 week intervals. We stopped velcade after 9/8/2021 therapy since she could not tolerate it well. We decided to continue with revlimid and dexamethasone only since then. On October 20, 2021, she presented to me for followup. I have been following Ms. Bar for multiple myeloma. She was on maintenance chemotherapy until May 7, 2017, and we stopped it after that since she could not tolerate maintenance chemotherapy because of significant myelosuppression. She was placed on under close observation. She noticed increase in size of her upper sternum lesion. It was decreased and back to normal size after chemo in 2016. I recognize that her serum lambda light chain has gradually increased over time. In view of her enlarging upper sternum lesion along with increasing lambda light chain, I believe she has recurrent multiple myeloma. I offered her to have repeat scans and bone marrow biopsy to confirm progression of the disease. However, she does not want to have repeat scans because it exacerbates her vitiligo. She also does not want to have biopsy at this moment. However, she agrees to start chemotherapy with Velcade, Revlimid and dexamethasone. Since her laboratory work-ups and clinical findings are consistent with recurrent multiple myeloma, we started first-line chemotherapy on July 7, 2021. Since she has been in remission for more than 4 years, we decided to rechallenge with velcade, revlimid and dexamethason. We stopped velcade after 9/8/2021 therapy since she could not tolerate it well. We decided to continue with revlimid and dexamethasone only since then. I recognize that her upper sternal mass is decreasing in size. However, I recognize that her serum lambda light chain is going up some on 9/22/21 blood test. Her serum creatinine went up to 1.3 as well.      I am a concern about rising serum lambda light chain and I recommend her to have of multiple myeloma work-ups today and I will follow-up with the results. She is currently on Revlimid 10 mg 3 weeks on 1 week off schedule. If her lambda light chain continue to close up on today blood tests, I will consider to increase the dose of Revlimid starting from next cycle. She started her cycle of revlimid today. Edema of lower legs - Decrease the Methasone dose to 2 mg weekly. I will also start Lasix 20 mg daily as needed basis. I will continue to monitor her edema closely. Revlimid induced thrombosis prophylaxis - I recommend her to continue with aspirin 81 mg daily. Herpes zoster prophylaxis - I recommend her to continue with acyclovir 400 mg twice daily. I also recommend to continue with zoledronic acid every twelve month intervals and her next treatment will be on September 29, 2021. For her hypertension, I recommend to continue with metoprolol 50 mg daily for now. Her blood pressure reading on today is normal. I also recommend her to continue with vitamin B12 supplementation. Health maintenance - I asked her to have age-appropriate cancer screening, exercise, low-fat and low-sodium diet. She said she is not ready to take covid vaccine at this moment. I will continue to monitor her blood pressure closely. PAST MEDICAL HISTORY:  Past medical history is significant for the following. 1. Gastroesophageal reflux disease. 2. Stage II cervical cancer diagnosed in 1980, status post laser surgery. PAST SURGICAL HISTORY:  Past surgical history is significant for the following. 1. Cholecystectomy in 1998.  2. Tonsillectomy and appendectomy. 3. Tubal ligation in 1981. 4. Cheloid removal in October 2002. FAMILY HISTORY:  Family history is significant for small cell lung cancer in her mother. No other family history of cancer disease. SOCIAL HISTORY:  She denies smoking, alcohol drinking, and illicit drug abuse.   She is  for 11 years and has two children. She is currently working at [employer]. ALLERGIES:  She claimed to have allergies to aspirin and iron. Review of Systems: \"Per interval history; otherwise 10 point ROS is negative. \"   Her energy level is fair, appetite, and sleep are good. She denies fever, chills, night sweats, cough, shortness of breath, chest pain, hemoptysis, or palpitations. Her bowel and bladder functions are normal. She denies nausea, vomiting, abdominal pain, diarrhea, constipation, dysuria, loss of appetite, or weight loss. She doesn't have neuropathy and she denies bleeding or clotting issues. She doesn't have any pain in her body. Denies anxiety or depression. The rest of the systems are unremarkable. Vital Signs:  BP (!) 146/70 (Site: Right Lower Arm, Position: Sitting, Cuff Size: Medium Adult)   Pulse 67   Temp 97.4 °F (36.3 °C) (Infrared)   Ht 5' 6\" (1.676 m)   Wt 212 lb (96.2 kg)   SpO2 98%   BMI 34.22 kg/m²     Physical Exam:  CONSTITUTIONAL: awake, alert, cooperative, no apparent distress   EYES: pupils equal, round and reactive to light, sclera clear and conjunctiva normal  ENT: Normocephalic, without obvious abnormality, atraumatic  NECK: supple, symmetrical, no jugular venous distension and no carotid bruits   HEMATOLOGIC/LYMPHATIC: no cervical, supraclavicular or axillary lymphadenopathy   LUNGS: VBS, no wheezes, no increased work of breathing, no crackles, clear to auscultation, no rhonchi,     CARDIOVASCULAR: regular rate and rhythm, normal S1 and S2, no murmur noted  ABDOMEN: normal bowel sounds x 4, non-tender, no masses palpated, no hepatosplenomegaly,  soft, non-distended,   MUSCULOSKELETAL: full range of motion noted, tone is normal  NEUROLOGIC: awake, alert, oriented to name, place and time. Motor skills grossly intact. SKIN: Normal skin color, texture, turgor and no jaundice.  appears intact   EXTREMITIES: no clubbing, no LE edema, no cyanosis, no leg swelling, Labs:  Hematology:  Lab Results   Component Value Date    WBC 3.1 (L) 09/22/2021    RBC 3.05 (L) 09/22/2021    HGB 8.8 (L) 09/22/2021    HCT 27.1 (L) 09/22/2021    MCV 88.9 09/22/2021    MCH 28.9 09/22/2021    MCHC 32.5 09/22/2021    RDW 17.7 (H) 09/22/2021     09/22/2021    MPV 11.3 (H) 09/22/2021    BANDSPCT 5 03/08/2017    SEGSPCT 76.9 (H) 09/22/2021    EOSRELPCT 0.6 09/22/2021    BASOPCT 0.3 09/22/2021    LYMPHOPCT 19.6 (L) 09/22/2021    MONOPCT 2.6 09/22/2021    BANDABS 0.12 03/08/2017    SEGSABS 2.4 09/22/2021    EOSABS 0.0 09/22/2021    BASOSABS 0.0 09/22/2021    LYMPHSABS 0.6 09/22/2021    MONOSABS 0.1 09/22/2021    DIFFTYPE AUTOMATED DIFFERENTIAL 09/22/2021     Lab Results   Component Value Date    ESR 82 (H) 09/22/2021     Chemistry:  Lab Results   Component Value Date     09/22/2021    K 4.7 09/22/2021     09/22/2021    CO2 21 09/22/2021    BUN 42 (H) 09/22/2021    CREATININE 1.3 (H) 09/22/2021    GLUCOSE 132 (H) 09/22/2021    CALCIUM 8.2 (L) 09/22/2021    PROT 6.0 (L) 09/22/2021    PROT 6.0 (L) 09/22/2021    LABALBU 3.6 09/22/2021    BILITOT 0.3 09/22/2021    ALKPHOS 114 09/22/2021    AST 16 09/22/2021    ALT 23 09/22/2021    LABGLOM 41 (L) 09/22/2021    GFRAA 49 (L) 09/22/2021    POCCA 1.19 09/01/2021    POCGLU 174 (H) 09/01/2021     Lab Results   Component Value Date     09/22/2021     No components found for: LD  Lab Results   Component Value Date    TSHHS 2.340 10/31/2018     Immunology:  Lab Results   Component Value Date    PROT 6.0 (L) 09/22/2021    PROT 6.0 (L) 09/22/2021    SPEP  09/22/2021     INTERPRETATION - A definitive monoclonal protein is not identified. LP.    SPEP  09/22/2021     INTERPRETATION - Decreased albumin and total proteins.   LP.    ALBUMINELP 2.8 (L) 09/22/2021    LABALPH 0.5 (H) 09/22/2021    LABALPH 1.0 09/22/2021    LABBETA 1.0 09/22/2021    GAMGLOB 0.8 09/22/2021     Lab Results   Component Value Date    Jean Blount 59.76 (H) 09/22/2021 LAMBDAUVOL 67.21 (H) 11/11/2020    KLFLCR 1.81 (H) 09/22/2021     Lab Results   Component Value Date    B2M 7.2 (H) 09/22/2021     Coagulation Panel:  Lab Results   Component Value Date    PROTIME 12.8 (H) 10/31/2018    INR 1.12 10/31/2018    APTT 29.7 10/31/2018    DDIMER <200 05/17/2017     Anemia Panel:  Lab Results   Component Value Date    UFPOCSJT41 228.6 05/17/2017    FOLATE >20.0 (H) 05/17/2017     Tumor Markers:  No results found for: , CEA, , LABCA2, PSA  Observations:  No data recorded        Assessment & Plan:   Right tibial intramedullary lesion and upper sternum lesion - with multiple lytic bone lesions - biopsy is consistent with multiple myeloma - s/p chemotherapy with Velcade, Revlimid and Dexamentasone - currently on maintenance Melphalan and prednisone. PLAN:  Ms. Donita Garnett has been followed for multiple myeloma. Since she is found to have recurrent multiple myeloma, we started chemotherapy with Velcade, Revlimid and dexamethasone since July 7, 2021. Zoledronic acid was started on July 7, 2021 and we will continue to give it every 12 week intervals. On October 20, 2021, she presented to me for followup. I have been following Ms. Bar for multiple myeloma. She was on maintenance chemotherapy until May 7, 2017, and we stopped it after that since she could not tolerate maintenance chemotherapy because of significant myelosuppression. She was placed on under close observation. She noticed increase in size of her upper sternum lesion. It was decreased and back to normal size after chemo in 2016. I recognize that her serum lambda light chain has gradually increased over time. In view of her enlarging upper sternum lesion along with increasing lambda light chain, I believe she has recurrent multiple myeloma. I offered her to have repeat scans and bone marrow biopsy to confirm progression of the disease.   However, she does not want to have repeat scans because it exacerbates her vitiligo. She also does not want to have biopsy at this moment. However, she agrees to start chemotherapy with Velcade, Revlimid and dexamethasone. Since her laboratory work-ups and clinical findings are consistent with recurrent multiple myeloma, we started first-line chemotherapy on July 7, 2021. Since she has been in remission for more than 4 years, we decided to rechallenge with velcade, revlimid and dexamethason. We stopped velcade after 9/8/2021 therapy since she could not tolerate it well. We decided to continue with revlimid and dexamethasone only since then. I recognize that her upper sternal mass is decreasing in size. However, I recognize that her serum lambda light chain is going up some on 9/22/21 blood test. Her serum creatinine went up to 1.3 as well. I am a concern about rising serum lambda light chain and I recommend her to have of multiple myeloma work-ups today and I will follow-up with the results. She is currently on Revlimid 10 mg 3 weeks on 1 week off schedule. If her lambda light chain continue to close up on today blood tests, I will consider to increase the dose of Revlimid starting from next cycle. She started her cycle of revlimid today. Edema of lower legs - Decrease the Methasone dose to 2 mg weekly. I will also start Lasix 20 mg daily as needed basis. I will continue to monitor her edema closely. Revlimid induced thrombosis prophylaxis - I recommend her to continue with aspirin 81 mg daily. Herpes zoster prophylaxis - I recommend her to continue with acyclovir 400 mg twice daily. I also recommend to continue with zoledronic acid every twelve month intervals and her next treatment will be on September 29, 2021. For her hypertension, I recommend to continue with metoprolol 50 mg daily for now. Her blood pressure reading on today is normal. I also recommend her to continue with vitamin B12 supplementation.      Health maintenance - I asked her to have age-appropriate cancer screening, exercise, low-fat and low-sodium diet. She said she is not ready to take covid vaccine at this moment. I answered all her questions and concerns for today. I asked her to follow up with primary care physician on regular basis. Recent imaging and labs were reviewed and discussed with the patient.

## 2021-10-20 ENCOUNTER — OFFICE VISIT (OUTPATIENT)
Dept: ONCOLOGY | Age: 71
End: 2021-10-20
Payer: MEDICARE

## 2021-10-20 ENCOUNTER — HOSPITAL ENCOUNTER (OUTPATIENT)
Dept: INFUSION THERAPY | Age: 71
Discharge: HOME | End: 2021-10-20
Payer: MEDICARE

## 2021-10-20 VITALS
HEART RATE: 67 BPM | DIASTOLIC BLOOD PRESSURE: 70 MMHG | OXYGEN SATURATION: 98 % | BODY MASS INDEX: 34.07 KG/M2 | TEMPERATURE: 97.4 F | WEIGHT: 212 LBS | SYSTOLIC BLOOD PRESSURE: 146 MMHG | HEIGHT: 66 IN

## 2021-10-20 DIAGNOSIS — C90.00 MULTIPLE MYELOMA NOT HAVING ACHIEVED REMISSION (HCC): ICD-10-CM

## 2021-10-20 DIAGNOSIS — C90.00 MULTIPLE MYELOMA NOT HAVING ACHIEVED REMISSION (HCC): Primary | ICD-10-CM

## 2021-10-20 LAB
ALBUMIN SERPL-MCNC: 3.9 GM/DL (ref 3.4–5)
ALP BLD-CCNC: 88 IU/L (ref 40–129)
ALT SERPL-CCNC: 11 U/L (ref 10–40)
ANION GAP SERPL CALCULATED.3IONS-SCNC: 9 MMOL/L (ref 4–16)
AST SERPL-CCNC: 13 IU/L (ref 15–37)
BASOPHILS ABSOLUTE: 0 K/CU MM
BASOPHILS RELATIVE PERCENT: 1.1 % (ref 0–1)
BILIRUB SERPL-MCNC: 0.4 MG/DL (ref 0–1)
BUN BLDV-MCNC: 20 MG/DL (ref 6–23)
CALCIUM SERPL-MCNC: 9 MG/DL (ref 8.3–10.6)
CHLORIDE BLD-SCNC: 104 MMOL/L (ref 99–110)
CO2: 22 MMOL/L (ref 21–32)
CREAT SERPL-MCNC: 0.6 MG/DL (ref 0.6–1.1)
DIFFERENTIAL TYPE: ABNORMAL
EOSINOPHILS ABSOLUTE: 0 K/CU MM
EOSINOPHILS RELATIVE PERCENT: 0.4 % (ref 0–3)
ERYTHROCYTE SEDIMENTATION RATE: 60 MM/HR (ref 0–30)
GFR AFRICAN AMERICAN: >60 ML/MIN/1.73M2
GFR NON-AFRICAN AMERICAN: >60 ML/MIN/1.73M2
GLUCOSE BLD-MCNC: 141 MG/DL (ref 70–99)
HCT VFR BLD CALC: 26.9 % (ref 37–47)
HEMOGLOBIN: 8.5 GM/DL (ref 12.5–16)
IGA: 181 MG/DL (ref 69–382)
IGG,SERUM: 788 MG/DL (ref 723–1685)
IGM,SERUM: 36 MG/DL (ref 62–277)
LACTATE DEHYDROGENASE: 207 IU/L (ref 120–246)
LYMPHOCYTES ABSOLUTE: 0.6 K/CU MM
LYMPHOCYTES RELATIVE PERCENT: 19.8 % (ref 24–44)
MCH RBC QN AUTO: 29.7 PG (ref 27–31)
MCHC RBC AUTO-ENTMCNC: 31.6 % (ref 32–36)
MCV RBC AUTO: 94.1 FL (ref 78–100)
MONOCYTES ABSOLUTE: 0.1 K/CU MM
MONOCYTES RELATIVE PERCENT: 2.8 % (ref 0–4)
PDW BLD-RTO: 20.5 % (ref 11.7–14.9)
PLATELET # BLD: 218 K/CU MM (ref 140–440)
PMV BLD AUTO: 9.4 FL (ref 7.5–11.1)
POTASSIUM SERPL-SCNC: 4.6 MMOL/L (ref 3.5–5.1)
RBC # BLD: 2.86 M/CU MM (ref 4.2–5.4)
SEGMENTED NEUTROPHILS ABSOLUTE COUNT: 2.2 K/CU MM
SEGMENTED NEUTROPHILS RELATIVE PERCENT: 75.9 % (ref 36–66)
SODIUM BLD-SCNC: 135 MMOL/L (ref 135–145)
TOTAL PROTEIN: 6.1 GM/DL (ref 6.4–8.2)
WBC # BLD: 2.8 K/CU MM (ref 4–10.5)

## 2021-10-20 PROCEDURE — 85025 COMPLETE CBC W/AUTO DIFF WBC: CPT

## 2021-10-20 PROCEDURE — 85652 RBC SED RATE AUTOMATED: CPT

## 2021-10-20 PROCEDURE — 99214 OFFICE O/P EST MOD 30 MIN: CPT | Performed by: INTERNAL MEDICINE

## 2021-10-20 PROCEDURE — 36415 COLL VENOUS BLD VENIPUNCTURE: CPT

## 2021-10-20 PROCEDURE — 4040F PNEUMOC VAC/ADMIN/RCVD: CPT | Performed by: INTERNAL MEDICINE

## 2021-10-20 PROCEDURE — 99211 OFF/OP EST MAY X REQ PHY/QHP: CPT

## 2021-10-20 PROCEDURE — G8417 CALC BMI ABV UP PARAM F/U: HCPCS | Performed by: INTERNAL MEDICINE

## 2021-10-20 PROCEDURE — G8427 DOCREV CUR MEDS BY ELIG CLIN: HCPCS | Performed by: INTERNAL MEDICINE

## 2021-10-20 PROCEDURE — G8484 FLU IMMUNIZE NO ADMIN: HCPCS | Performed by: INTERNAL MEDICINE

## 2021-10-20 PROCEDURE — 83615 LACTATE (LD) (LDH) ENZYME: CPT

## 2021-10-20 PROCEDURE — 80053 COMPREHEN METABOLIC PANEL: CPT

## 2021-10-20 PROCEDURE — 82232 ASSAY OF BETA-2 PROTEIN: CPT

## 2021-10-20 PROCEDURE — 84165 PROTEIN E-PHORESIS SERUM: CPT

## 2021-10-20 PROCEDURE — 1036F TOBACCO NON-USER: CPT | Performed by: INTERNAL MEDICINE

## 2021-10-20 PROCEDURE — 86320 SERUM IMMUNOELECTROPHORESIS: CPT

## 2021-10-20 PROCEDURE — G8400 PT W/DXA NO RESULTS DOC: HCPCS | Performed by: INTERNAL MEDICINE

## 2021-10-20 PROCEDURE — 82784 ASSAY IGA/IGD/IGG/IGM EACH: CPT

## 2021-10-20 PROCEDURE — 1123F ACP DISCUSS/DSCN MKR DOCD: CPT | Performed by: INTERNAL MEDICINE

## 2021-10-20 PROCEDURE — 1090F PRES/ABSN URINE INCON ASSESS: CPT | Performed by: INTERNAL MEDICINE

## 2021-10-20 PROCEDURE — 83883 ASSAY NEPHELOMETRY NOT SPEC: CPT

## 2021-10-20 PROCEDURE — 3017F COLORECTAL CA SCREEN DOC REV: CPT | Performed by: INTERNAL MEDICINE

## 2021-10-20 NOTE — PROGRESS NOTES
MA Rooming Questions  Patient: Elieser Both  MRN: [id number]    Date: 10/20/2021        1. Do you have any new issues?   no         2. Do you need any refills on medications?    no    3. Have you had any imaging done since your last visit?   no    4. Have you been hospitalized or seen in the emergency room since your last visit here?   no    5. Did the patient have a depression screening completed today?  No    No data recorded     PHQ-9 Given to (if applicable):               PHQ-9 Score (if applicable):                     [] Positive     []  Negative              Does question #9 need addressed (if applicable)                     [] Yes    []  No               Horace Sarmiento CMA

## 2021-10-22 LAB
BETA-2 MICROGLOBULIN: 3.1 MG/L (ref 1.1–2.4)
KAPPA QUANT FREE LIGHT CHAINS: 27.78 MG/L (ref 3.3–19.4)
KAPPA/LAMBDA FREE LIGHT CHAIN RATIO: 1.27 (ref 0.26–1.65)
LAMBDA FREE LIGHT CHAINS QNT: 21.95 MG/L (ref 5.71–26.3)

## 2021-10-23 LAB
ALBUMIN ELP: 3 GM/DL (ref 3.2–5.6)
ALPHA-1-GLOBULIN: 0.4 GM/DL (ref 0.1–0.4)
ALPHA-2-GLOBULIN: 0.8 GM/DL (ref 0.4–1.2)
BETA GLOBULIN: 1 GM/DL (ref 0.5–1.3)
GAMMA GLOBULIN: 0.9 GM/DL (ref 0.5–1.6)
SPEP INTERPRETATION: ABNORMAL
SPEP INTERPRETATION: NORMAL
TOTAL PROTEIN: 6.1 GM/DL (ref 6.4–8.2)

## 2021-10-25 NOTE — PROGRESS NOTES
Patient Name: Douglas Merritt  Patient : 1950  Patient MRN: [id number]     Primary Oncologist: Nick Whitehead MD  Referring Provider: Salas Callahan MD     Date of Service: 10/28/2021      Chief Complaint:   Chief Complaint   Patient presents with    Follow-up     Patient Active Problem List:     Multiple myeloma not having achieved remission     HPI:   Ms. Diogo Conrad is a 81-year-old very pleasant patient with a medical history significant for stage II cervical cancer diagnosed in , status post laser surgery, gastroesophageal reflux disease, initially referred to me on 2014 for evaluation of right tibial intramedullary lesion. She stated that she has been having right leg pain since 2014, which has gradually been getting worse over time. She was seen by her primary care physician and had x-ray on 2014. It showed abnormal lucency within the tibial shaft at the junction of the mid and proximal third. MRI of the right lower extremity was done on 2014, and it showed marrow replacing 5.8 cm intramedullary lesion in the right mid tibial shaft with cortical breakthrough and adjacent periostitis. This corresponds to the lytic/lucent lesion on the recent x-ray. Differential considerations include lytic metastatic disease or multiple myeloma. She was subsequently referred to me for evaluation. She had upper sternum tumor for the last four years, which is about 8 by 10 cm in diameter. She otherwise does not have any other significant symptoms. 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 It is too small to measure the quantity. Her ESR was mildly elevated to 49.       Laboratory results done on September 3, 2014, showed normal CBC, mild elevation in serum creatinine (1.3), normal calcium (9.8), normal total protein (7.4), beta-2 microglobulin (4.2), triclonal gammopathy (2 IgA Lambda and 1 free Lambda light chain) on serum protein electrophoresis, two of which are large enough to measure on serum protein electrophoresis and which together total 300 mg/dl, ESR (60). Quantitative immunoglobulin IgA was elevated (760), IgM (41 mg/dl), and IgG (926). She also has 3.4 grams of protein in 23 hour urine and urine immunoelectrophoresis is consistent with monoclonal free Lambda light chains. She had biopsy of the right tibial lesions and sternal lesions on August 29, 2014 and final pathology was consistent with plasma cell myeloma. Flow cytometry was consistent with 23 percent of CD56 positive clonal plasma cell population, consistent with plasma cell neoplasms. FISH study showed \"Abnormal Myeloma FISH Profile. Monosomy for chromosome 13. Aneuploid population: Gain of chromosomes 15 and FGFR3/4p\". Cytogenetics wasn't performed by laboratory. Complete bone survey was done on September 3, 2014, and it showed multiple lucent lesions involving the visualized bony skeleton concerning for metastatic disease. This involves the skull, T10 vertebral body, left humerus, and right femur. Faint opacity overlying the left upper lobe could be related to atelectasis, mass, and/or infiltrate. Followup chest radiograph is recommended to assure stability/resolution. The lungs are under aerated, which limits evaluation. I believe faint opacity in the left upper lobe is most likely due to upper sternal lesion. Chemotherapy with Velcade, Revlimid and dexamethasone was started on September 16, 2014. Palliative radiation therapy to the right tibia lesion was also started by Dr. Bria Ness on September 29, 2014 and she completed it on October 10, 2014.     Her serum free Lambda light chain on November 4, 2014, after the second cycle of chemotherapy was 1.25 (normal), after the first cycle of chemotherapy it was 4.32 (October 1, 2014), and decreased from pretreatment value of 141 on September 3, 2014. Her 24-hour urine protein has decreased to 90 mg per 24 hour on November 25, 2014, after third cycle of chemotherapy. It has decreased from 3.4 grams before chemotherapy. Ms. Elena Aleman completed her eight cycle of chemotherapy with Velcade, Revlimid and dexamethasone on February 27, 2015. Maintenance chemotherapy with Velcade was started on March 17, 2015. Since Ms. Elena Aleman developed significant neuropathy from maintenance chemotherapy with Velcade, we stopped Velcade on July 19, 2016. Since she has minimal residual disease and she is not considering stem-cell transplantation, I recommend to start melphalan and dexamethasone as a maintenance chemotherapy. It was started since August 11, 2016 and we stopped it on November 18, 2016, since melphalan is not available as of November 16, 2016. Since we could not get melphalan, we changed her chemotherapy to Revlimid and dexamethasone. She has been on Revlimid until January 18, 2017. I stopped the Revlimid since she could not tolerate Revlimid and dexamethasone very well. She also developed right lower extremity deep vein thrombosis secondary to the Revlimid. Anticoagulation therapy with Eliquis was started since January 18, 2017. We resumed her back on melphalan and prednisone since January 18, 2017. Eliquis was stopped on May 17, 2017, since she has been on Eliquis for about four months duration. Her D-dimer level done on May 17, 2017, was also within the normal range. Ms. Elena Aleman was found to have leukopenia, anemia, and thrombocytopenia on a blood test done on May 17, 2017, and I believe it is due to chemotherapy. Her chemotherapy was held since May 17, 2017. Since she is found to have recurrent multiple myeloma, we started chemotherapy with Velcade, Revlimid and dexamethasone since July 7, 2021.   Zoledronic acid was started on July 7, 2021 and we will continue to give it every 12 week intervals. We stopped velcade after 9/8/2021 therapy since she could not tolerate it well. We decided to continue with revlimid and dexamethasone only since then. On October 28, 2021, she presented to me for followup via televisit. I have been following Ms. Bar for multiple myeloma. She was on maintenance chemotherapy until May 7, 2017, and we stopped it after that since she could not tolerate maintenance chemotherapy because of significant myelosuppression. She was placed on under close observation. She noticed increase in size of her upper sternum lesion. It was decreased and back to normal size after chemo in 2016. I recognize that her serum lambda light chain has gradually increased over time. In view of her enlarging upper sternum lesion along with increasing lambda light chain, I believe she has recurrent multiple myeloma. I offered her to have repeat scans and bone marrow biopsy to confirm progression of the disease. However, she does not want to have repeat scans because it exacerbates her vitiligo. She also does not want to have biopsy at this moment. However, she agrees to start chemotherapy with Velcade, Revlimid and dexamethasone. Since her laboratory work-ups and clinical findings are consistent with recurrent multiple myeloma, we started first-line chemotherapy on July 7, 2021. Since she has been in remission for more than 4 years, we decided to rechallenge with velcade, revlimid and dexamethason. We stopped velcade after 9/8/2021 therapy since she could not tolerate it well. We decided to continue with revlimid and dexamethasone only since then. I recognize that her upper sternal mass is decreasing in size. I also recognize that her serum lambda light chain is stable on 10/20/21 blood test. Her serum creatinine was 0.6 mg/dl.      I recommend her to continue with current chemotherapy [Revlimid 10 mg 3 weeks on 1 week off and dexamethasone] for now on. I will repeat multiple myeloma work-ups again on 11/17/21 and I will follow-up with the results. If she has progression of the disease in the future, I will consider to increase the dose of Revlimid. Edema of lower legs - recommend to continue with Lasix 20 mg daily as needed basis. I will continue to monitor her edema closely. Revlimid induced thrombosis prophylaxis - I recommend her to continue with aspirin 81 mg daily. Herpes zoster prophylaxis - I recommend her to continue with acyclovir 400 mg twice daily. I also recommend to continue with zoledronic acid every twelve month intervals. For her hypertension, I recommend to continue with metoprolol 50 mg daily for now. Her blood pressure reading on today is normal. I also recommend her to continue with vitamin B12 supplementation. Health maintenance - I asked her to have age-appropriate cancer screening, exercise, low-fat and low-sodium diet. I will continue to monitor her blood pressure closely. PAST MEDICAL HISTORY:  Past medical history is significant for the following. 1. Gastroesophageal reflux disease. 2. Stage II cervical cancer diagnosed in 1980, status post laser surgery. PAST SURGICAL HISTORY:  Past surgical history is significant for the following. 1. Cholecystectomy in 1998.  2. Tonsillectomy and appendectomy. 3. Tubal ligation in 1981. 4. Cheloid removal in October 2002. FAMILY HISTORY:  Family history is significant for small cell lung cancer in her mother. No other family history of cancer disease. SOCIAL HISTORY:  She denies smoking, alcohol drinking, and illicit drug abuse. She is  for 11 years and has two children. She is currently working at [employer]. ALLERGIES:  She claimed to have allergies to aspirin and iron. Review of Systems: \"Per interval history; otherwise 10 point ROS is negative. \"   Her energy level is stable, appetite, and sleep are pretty good. She doesn't have fever, chills, night sweats, cough, shortness of breath, chest pain, hemoptysis, or palpitations. Her bowel and bladder functions are normal. She doesn't have nausea, vomiting, abdominal pain, diarrhea, constipation, dysuria, loss of appetite, or weight loss. She denies neuropathy and she doesn't have bleeding or clotting issues. She denies any pain in her body. No anxiety or depression. The rest of the systems are unremarkable. Vital Signs: There were no vitals taken for this visit.     Physical Exam:      Labs:  Hematology:  Lab Results   Component Value Date    WBC 2.8 (L) 10/20/2021    RBC 2.86 (L) 10/20/2021    HGB 8.5 (L) 10/20/2021    HCT 26.9 (L) 10/20/2021    MCV 94.1 10/20/2021    MCH 29.7 10/20/2021    MCHC 31.6 (L) 10/20/2021    RDW 20.5 (H) 10/20/2021     10/20/2021    MPV 9.4 10/20/2021    BANDSPCT 5 03/08/2017    SEGSPCT 75.9 (H) 10/20/2021    EOSRELPCT 0.4 10/20/2021    BASOPCT 1.1 (H) 10/20/2021    LYMPHOPCT 19.8 (L) 10/20/2021    MONOPCT 2.8 10/20/2021    BANDABS 0.12 03/08/2017    SEGSABS 2.2 10/20/2021    EOSABS 0.0 10/20/2021    BASOSABS 0.0 10/20/2021    LYMPHSABS 0.6 10/20/2021    MONOSABS 0.1 10/20/2021    DIFFTYPE AUTOMATED DIFFERENTIAL 10/20/2021     Lab Results   Component Value Date    ESR 60 (H) 10/20/2021     Chemistry:  Lab Results   Component Value Date     10/20/2021    K 4.6 10/20/2021     10/20/2021    CO2 22 10/20/2021    BUN 20 10/20/2021    CREATININE 0.6 10/20/2021    GLUCOSE 141 (H) 10/20/2021    CALCIUM 9.0 10/20/2021    PROT 6.1 (L) 10/20/2021    PROT 6.1 (L) 10/20/2021    LABALBU 3.9 10/20/2021    BILITOT 0.4 10/20/2021    ALKPHOS 88 10/20/2021    AST 13 (L) 10/20/2021    ALT 11 10/20/2021    LABGLOM >60 10/20/2021    GFRAA >60 10/20/2021    POCCA 1.19 09/01/2021    POCGLU 174 (H) 09/01/2021     Lab Results   Component Value Date     10/20/2021     No components found for: LD  Lab Results Component Value Date    TSHHS 2.340 10/31/2018     Immunology:  Lab Results   Component Value Date    PROT 6.1 (L) 10/20/2021    PROT 6.1 (L) 10/20/2021    SPEP INTERPRETATION - Within normal limits. RS 10/20/2021    SPEP  10/20/2021     INTERPRETATION - Nonspecific pattern, decreased albuimin. No monoclonal gammopathy. RS    ALBUMINELP 3.0 (L) 10/20/2021    LABALPH 0.4 10/20/2021    LABALPH 0.8 10/20/2021    LABBETA 1.0 10/20/2021    GAMGLOB 0.9 10/20/2021     Lab Results   Component Value Date    KAPPAUVOL 27.78 (H) 10/20/2021    LAMBDAUVOL 67.21 (H) 11/11/2020    KLFLCR 1.27 10/20/2021     Lab Results   Component Value Date    B2M 3.1 (H) 10/20/2021     Coagulation Panel:  Lab Results   Component Value Date    PROTIME 12.8 (H) 10/31/2018    INR 1.12 10/31/2018    APTT 29.7 10/31/2018    DDIMER <200 05/17/2017     Anemia Panel:  Lab Results   Component Value Date    NYLNVBHN44 228.6 05/17/2017    FOLATE >20.0 (H) 05/17/2017     Tumor Markers:  No results found for: , CEA, , LABCA2, PSA  Observations:  No data recorded        Assessment & Plan:   Right tibial intramedullary lesion and upper sternum lesion - with multiple lytic bone lesions - biopsy is consistent with multiple myeloma - s/p chemotherapy with Velcade, Revlimid and Dexamentasone - currently on maintenance Melphalan and prednisone. PLAN:  Ms. Page Parra has been followed for multiple myeloma. Since she is found to have recurrent multiple myeloma, we started chemotherapy with Velcade, Revlimid and dexamethasone since July 7, 2021. Zoledronic acid was started on July 7, 2021 and we will continue to give it every 12 week intervals. On October 28, 2021, she presented to me for followup via televisit. I have been following Ms. Bar for multiple myeloma.   She was on maintenance chemotherapy until May 7, 2017, and we stopped it after that since she could not tolerate maintenance chemotherapy because of significant myelosuppression. She was placed on under close observation. She noticed increase in size of her upper sternum lesion. It was decreased and back to normal size after chemo in 2016. I recognize that her serum lambda light chain has gradually increased over time. In view of her enlarging upper sternum lesion along with increasing lambda light chain, I believe she has recurrent multiple myeloma. I offered her to have repeat scans and bone marrow biopsy to confirm progression of the disease. However, she does not want to have repeat scans because it exacerbates her vitiligo. She also does not want to have biopsy at this moment. However, she agrees to start chemotherapy with Velcade, Revlimid and dexamethasone. Since her laboratory work-ups and clinical findings are consistent with recurrent multiple myeloma, we started first-line chemotherapy on July 7, 2021. Since she has been in remission for more than 4 years, we decided to rechallenge with velcade, revlimid and dexamethason. We stopped velcade after 9/8/2021 therapy since she could not tolerate it well. We decided to continue with revlimid and dexamethasone only since then. I recognize that her upper sternal mass is decreasing in size. I also recognize that her serum lambda light chain is stable on 10/20/21 blood test. Her serum creatinine was 0.6 mg/dl. I recommend her to continue with current chemotherapy [Revlimid 10 mg 3 weeks on 1 week off and dexamethasone] for now on. I will repeat multiple myeloma work-ups again on 11/17/21 and I will follow-up with the results. If she has progression of the disease in the future, I will consider to increase the dose of Revlimid. Edema of lower legs - recommend to continue with Lasix 20 mg daily as needed basis. I will continue to monitor her edema closely. Revlimid induced thrombosis prophylaxis - I recommend her to continue with aspirin 81 mg daily.      Herpes zoster prophylaxis - I recommend her to continue with acyclovir 400 mg twice daily. I also recommend to continue with zoledronic acid every twelve month intervals. For her hypertension, I recommend to continue with metoprolol 50 mg daily for now. Her blood pressure reading on today is normal. I also recommend her to continue with vitamin B12 supplementation. Health maintenance - I asked her to have age-appropriate cancer screening, exercise, low-fat and low-sodium diet. I answered all her questions and concerns for today. I asked her to follow up with primary care physician on regular basis. Recent imaging and labs were reviewed and discussed with the patient.    Start time: 3:58 pm   End time: 4:10 pm  Total time spent with her - 12 minutes

## 2021-10-28 ENCOUNTER — OFFICE VISIT (OUTPATIENT)
Dept: ONCOLOGY | Age: 71
End: 2021-10-28
Payer: MEDICARE

## 2021-10-28 DIAGNOSIS — C90.00 MULTIPLE MYELOMA NOT HAVING ACHIEVED REMISSION (HCC): Primary | ICD-10-CM

## 2021-10-28 PROCEDURE — 99422 OL DIG E/M SVC 11-20 MIN: CPT | Performed by: INTERNAL MEDICINE

## 2021-10-28 RX ORDER — CEPHALEXIN 500 MG/1
500 CAPSULE ORAL EVERY 12 HOURS
COMMUNITY
Start: 2021-10-25 | End: 2021-10-30

## 2021-10-28 NOTE — PROGRESS NOTES
MA Rooming Questions  Patient: Jose Pierce  MRN: [id number]    Date: 10/28/2021        1. Do you have any new issues?   no         2. Do you need any refills on medications?    no    3. Have you had any imaging done since your last visit?   no    4. Have you been hospitalized or seen in the emergency room since your last visit here?   no    5. Did the patient have a depression screening completed today?  No    No data recorded     PHQ-9 Given to (if applicable):               PHQ-9 Score (if applicable):                     [] Positive     [x]  Negative              Does question #9 need addressed (if applicable)                     [] Yes    []  No               Sedrick Feliz MA

## 2021-11-17 ENCOUNTER — HOSPITAL ENCOUNTER (OUTPATIENT)
Dept: INFUSION THERAPY | Age: 71
Discharge: HOME | End: 2021-11-17
Payer: MEDICARE

## 2021-11-17 DIAGNOSIS — C90.00 MULTIPLE MYELOMA NOT HAVING ACHIEVED REMISSION (HCC): ICD-10-CM

## 2021-11-17 LAB
ALBUMIN SERPL-MCNC: 4 GM/DL (ref 3.4–5)
ALP BLD-CCNC: 94 IU/L (ref 40–129)
ALT SERPL-CCNC: 13 U/L (ref 10–40)
ANION GAP SERPL CALCULATED.3IONS-SCNC: 14 MMOL/L (ref 4–16)
AST SERPL-CCNC: 13 IU/L (ref 15–37)
BASOPHILS ABSOLUTE: 0.1 K/CU MM
BASOPHILS RELATIVE PERCENT: 1.8 % (ref 0–1)
BILIRUB SERPL-MCNC: 0.3 MG/DL (ref 0–1)
BUN BLDV-MCNC: 17 MG/DL (ref 6–23)
CALCIUM SERPL-MCNC: 9.8 MG/DL (ref 8.3–10.6)
CHLORIDE BLD-SCNC: 105 MMOL/L (ref 99–110)
CO2: 23 MMOL/L (ref 21–32)
CREAT SERPL-MCNC: 0.8 MG/DL (ref 0.6–1.1)
DIFFERENTIAL TYPE: ABNORMAL
EOSINOPHILS ABSOLUTE: 0 K/CU MM
EOSINOPHILS RELATIVE PERCENT: 0 % (ref 0–3)
ERYTHROCYTE SEDIMENTATION RATE: 29 MM/HR (ref 0–30)
GFR AFRICAN AMERICAN: >60 ML/MIN/1.73M2
GFR NON-AFRICAN AMERICAN: >60 ML/MIN/1.73M2
GLUCOSE BLD-MCNC: 128 MG/DL (ref 70–99)
HCT VFR BLD CALC: 30.3 % (ref 37–47)
HEMOGLOBIN: 9.5 GM/DL (ref 12.5–16)
IGA: 186 MG/DL (ref 69–382)
IGG,SERUM: 846 MG/DL (ref 723–1685)
IGM,SERUM: 43 MG/DL (ref 62–277)
LACTATE DEHYDROGENASE: 197 IU/L (ref 120–246)
LYMPHOCYTES ABSOLUTE: 0.5 K/CU MM
LYMPHOCYTES RELATIVE PERCENT: 15 % (ref 24–44)
MCH RBC QN AUTO: 29.7 PG (ref 27–31)
MCHC RBC AUTO-ENTMCNC: 31.4 % (ref 32–36)
MCV RBC AUTO: 94.7 FL (ref 78–100)
MONOCYTES ABSOLUTE: 0.1 K/CU MM
MONOCYTES RELATIVE PERCENT: 2.6 % (ref 0–4)
PDW BLD-RTO: 20 % (ref 11.7–14.9)
PLATELET # BLD: 297 K/CU MM (ref 140–440)
PMV BLD AUTO: 9.1 FL (ref 7.5–11.1)
POTASSIUM SERPL-SCNC: 4.7 MMOL/L (ref 3.5–5.1)
RBC # BLD: 3.2 M/CU MM (ref 4.2–5.4)
SEGMENTED NEUTROPHILS ABSOLUTE COUNT: 2.8 K/CU MM
SEGMENTED NEUTROPHILS RELATIVE PERCENT: 80.6 % (ref 36–66)
SODIUM BLD-SCNC: 142 MMOL/L (ref 135–145)
TOTAL PROTEIN: 6.5 GM/DL (ref 6.4–8.2)
WBC # BLD: 3.4 K/CU MM (ref 4–10.5)

## 2021-11-17 PROCEDURE — 36415 COLL VENOUS BLD VENIPUNCTURE: CPT

## 2021-11-17 PROCEDURE — 84165 PROTEIN E-PHORESIS SERUM: CPT

## 2021-11-17 PROCEDURE — 85652 RBC SED RATE AUTOMATED: CPT

## 2021-11-17 PROCEDURE — 83883 ASSAY NEPHELOMETRY NOT SPEC: CPT

## 2021-11-17 PROCEDURE — 85025 COMPLETE CBC W/AUTO DIFF WBC: CPT

## 2021-11-17 PROCEDURE — 83615 LACTATE (LD) (LDH) ENZYME: CPT

## 2021-11-17 PROCEDURE — 82784 ASSAY IGA/IGD/IGG/IGM EACH: CPT

## 2021-11-17 PROCEDURE — 80053 COMPREHEN METABOLIC PANEL: CPT

## 2021-11-17 PROCEDURE — 82232 ASSAY OF BETA-2 PROTEIN: CPT

## 2021-11-17 PROCEDURE — 86320 SERUM IMMUNOELECTROPHORESIS: CPT

## 2021-11-19 LAB
ALBUMIN ELP: 3.6 GM/DL (ref 3.2–5.6)
ALPHA-1-GLOBULIN: 0.3 GM/DL (ref 0.1–0.4)
ALPHA-2-GLOBULIN: 0.8 GM/DL (ref 0.4–1.2)
BETA GLOBULIN: 1 GM/DL (ref 0.5–1.3)
BETA-2 MICROGLOBULIN: 3.6 MG/L (ref 1.1–2.4)
GAMMA GLOBULIN: 0.8 GM/DL (ref 0.5–1.6)
SPEP INTERPRETATION: NORMAL
SPEP INTERPRETATION: NORMAL
TOTAL PROTEIN: 6.5 GM/DL (ref 6.4–8.2)

## 2021-11-20 LAB
KAPPA QUANT FREE LIGHT CHAINS: 22.49 MG/L (ref 3.3–19.4)
KAPPA/LAMBDA FREE LIGHT CHAIN RATIO: 1.04 (ref 0.26–1.65)
LAMBDA FREE LIGHT CHAINS QNT: 21.53 MG/L (ref 5.71–26.3)

## 2021-11-29 NOTE — PROGRESS NOTES
Patient Name: Jaci Mireles  Patient : 1950  Patient MRN: [id number]     Primary Oncologist: Curt Ocampo MD  Referring Provider: Ny Gaffney MD     Date of Service: 2021      Chief Complaint:   Chief Complaint   Patient presents with    Follow-up     Patient Active Problem List:     Multiple myeloma not having achieved remission     HPI:   Ms. Samuel Lopez is a 72-year-old very pleasant patient with a medical history significant for stage II cervical cancer diagnosed in , status post laser surgery, gastroesophageal reflux disease, initially referred to me on 2014 for evaluation of right tibial intramedullary lesion. She stated that she has been having right leg pain since 2014, which has gradually been getting worse over time. She was seen by her primary care physician and had x-ray on 2014. It showed abnormal lucency within the tibial shaft at the junction of the mid and proximal third. MRI of the right lower extremity was done on 2014, and it showed marrow replacing 5.8 cm intramedullary lesion in the right mid tibial shaft with cortical breakthrough and adjacent periostitis. This corresponds to the lytic/lucent lesion on the recent x-ray. Differential considerations include lytic metastatic disease or multiple myeloma. She was subsequently referred to me for evaluation. She had upper sternum tumor for the last four years, which is about 8 by 10 cm in diameter. She otherwise does not have any other significant symptoms. 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 It is too small to measure the quantity. Her ESR was mildly elevated to 49.       Laboratory results done on September 3, 2014, showed normal CBC, mild elevation in serum creatinine (1.3), normal calcium (9.8), normal total protein (7.4), beta-2 microglobulin (4.2), triclonal gammopathy (2 IgA Lambda and 1 free Lambda light chain) on serum protein electrophoresis, two of which are large enough to measure on serum protein electrophoresis and which together total 300 mg/dl, ESR (60). Quantitative immunoglobulin IgA was elevated (760), IgM (41 mg/dl), and IgG (926). She also has 3.4 grams of protein in 23 hour urine and urine immunoelectrophoresis is consistent with monoclonal free Lambda light chains. She had biopsy of the right tibial lesions and sternal lesions on August 29, 2014 and final pathology was consistent with plasma cell myeloma. Flow cytometry was consistent with 23 percent of CD56 positive clonal plasma cell population, consistent with plasma cell neoplasms. FISH study showed \"Abnormal Myeloma FISH Profile. Monosomy for chromosome 13. Aneuploid population: Gain of chromosomes 15 and FGFR3/4p\". Cytogenetics wasn't performed by laboratory. Complete bone survey was done on September 3, 2014, and it showed multiple lucent lesions involving the visualized bony skeleton concerning for metastatic disease. This involves the skull, T10 vertebral body, left humerus, and right femur. Faint opacity overlying the left upper lobe could be related to atelectasis, mass, and/or infiltrate. Followup chest radiograph is recommended to assure stability/resolution. The lungs are under aerated, which limits evaluation. I believe faint opacity in the left upper lobe is most likely due to upper sternal lesion. Chemotherapy with Velcade, Revlimid and dexamethasone was started on September 16, 2014. Palliative radiation therapy to the right tibia lesion was also started by Dr. Yanna Mitchell on September 29, 2014 and she completed it on October 10, 2014.     Her serum free Lambda light chain on November 4, 2014, after the second cycle of chemotherapy was 1.25 (normal), after the first cycle of chemotherapy it was 4.32 (October 1, 2014), and decreased from pretreatment value of 141 on September 3, 2014. Her 24-hour urine protein has decreased to 90 mg per 24 hour on November 25, 2014, after third cycle of chemotherapy. It has decreased from 3.4 grams before chemotherapy. Ms. Lida Gardner completed her eight cycle of chemotherapy with Velcade, Revlimid and dexamethasone on February 27, 2015. Maintenance chemotherapy with Velcade was started on March 17, 2015. Since Ms. Lida Gardner developed significant neuropathy from maintenance chemotherapy with Velcade, we stopped Velcade on July 19, 2016. Since she has minimal residual disease and she is not considering stem-cell transplantation, I recommend to start melphalan and dexamethasone as a maintenance chemotherapy. It was started since August 11, 2016 and we stopped it on November 18, 2016, since melphalan is not available as of November 16, 2016. Since we could not get melphalan, we changed her chemotherapy to Revlimid and dexamethasone. She has been on Revlimid until January 18, 2017. I stopped the Revlimid since she could not tolerate Revlimid and dexamethasone very well. She also developed right lower extremity deep vein thrombosis secondary to the Revlimid. Anticoagulation therapy with Eliquis was started since January 18, 2017. We resumed her back on melphalan and prednisone since January 18, 2017. Eliquis was stopped on May 17, 2017, since she has been on Eliquis for about four months duration. Her D-dimer level done on May 17, 2017, was also within the normal range. Ms. Lida Gardner was found to have leukopenia, anemia, and thrombocytopenia on a blood test done on May 17, 2017, and I believe it is due to chemotherapy. Her chemotherapy was held since May 17, 2017. Since she is found to have recurrent multiple myeloma, we started chemotherapy with Velcade, Revlimid and dexamethasone since July 7, 2021.   Zoledronic acid was started on July 7, 2021 and we will continue to give it every 12 week intervals. We stopped velcade after 9/8/2021 therapy since she could not tolerate it well. We decided to continue with revlimid and dexamethasone only since then. On December 1, 2021, she presented to me for followup. I have been following Ms. Bar for multiple myeloma. She was on maintenance chemotherapy until May 7, 2017, and we stopped it after that since she could not tolerate maintenance chemotherapy because of significant myelosuppression. She was placed on under close observation. She noticed increase in size of her upper sternum lesion. It was decreased and back to normal size after chemo in 2016. I recognize that her serum lambda light chain has gradually increased over time. In view of her enlarging upper sternum lesion along with increasing lambda light chain, I believe she has recurrent multiple myeloma. I offered her to have repeat scans and bone marrow biopsy to confirm progression of the disease. However, she does not want to have repeat scans because it exacerbates her vitiligo. She also does not want to have biopsy at this moment. However, she agrees to start chemotherapy with Velcade, Revlimid and dexamethasone. Since her laboratory work-ups and clinical findings are consistent with recurrent multiple myeloma, we started first-line chemotherapy on July 7, 2021. Since she has been in remission for more than 4 years, we decided to rechallenge with velcade, revlimid and dexamethason. We stopped velcade after 9/8/2021 therapy since she could not tolerate it well. We decided to continue with revlimid and dexamethasone only since then. I recognize that her upper sternal mass is decreasing in size. I also recognize that her serum lambda light chain has been normal on 10/20/21 abd 11.17.21 blood test. Her serum creatinine was 0.8 mg/dl on 11/17/21.      I recommend her to continue with current chemotherapy [Revlimid 10 mg 3 weeks on 1 week off and dexamethasone] for now on. I will repeat multiple myeloma work-ups again in 2 months and I will follow-up with the results. If she has progression of the disease in the future, I will consider to increase the dose of Revlimid. Edema of lower legs - recommend to continue with Lasix 20 mg daily as needed basis. I will continue to monitor her edema closely. Revlimid induced thrombosis prophylaxis - I recommend her to continue with aspirin 81 mg daily. Herpes zoster prophylaxis - I recommend her to continue with acyclovir 400 mg twice daily. I also recommend to continue with zoledronic acid every twelve month intervals. For her hypertension, I recommend to continue with metoprolol 50 mg daily for now. Her blood pressure reading on today is normal. I also recommend her to continue with vitamin B12 supplementation. Health maintenance - I asked her to have age-appropriate cancer screening, exercise, low-fat and low-sodium diet. I will continue to monitor her blood pressure closely. PAST MEDICAL HISTORY:  Past medical history is significant for the following. 1. Gastroesophageal reflux disease. 2. Stage II cervical cancer diagnosed in 1980, status post laser surgery. PAST SURGICAL HISTORY:  Past surgical history is significant for the following. 1. Cholecystectomy in 1998.  2. Tonsillectomy and appendectomy. 3. Tubal ligation in 1981. 4. Cheloid removal in October 2002. FAMILY HISTORY:  Family history is significant for small cell lung cancer in her mother. No other family history of cancer disease. SOCIAL HISTORY:  She denies smoking, alcohol drinking, and illicit drug abuse. She is  for 11 years and has two children. She is currently working at [employer]. ALLERGIES:  She claimed to have allergies to aspirin and iron. Review of Systems: \"Per interval history; otherwise 10 point ROS is negative. \"   Her energy 09/01/2021    POCGLU 174 (H) 09/01/2021     Lab Results   Component Value Date     11/17/2021     No components found for: LD  Lab Results   Component Value Date    TSHHS 2.340 10/31/2018     Immunology:  Lab Results   Component Value Date    PROT 6.5 11/17/2021    PROT 6.5 11/17/2021    SPEP  11/17/2021     INTERPRETATION - No definitive monocloanl bands are detected. SAF    SPEP INTERPRETATION - Within normal limits. SAF 11/17/2021    ALBUMINELP 3.6 11/17/2021    LABALPH 0.3 11/17/2021    LABALPH 0.8 11/17/2021    LABBETA 1.0 11/17/2021    GAMGLOB 0.8 11/17/2021     Lab Results   Component Value Date    KAPPAUVOL 22.49 (H) 11/17/2021    LAMBDAUVOL 67.21 (H) 11/11/2020    KLFLCR 1.04 11/17/2021     Lab Results   Component Value Date    B2M 3.6 (H) 11/17/2021     Coagulation Panel:  Lab Results   Component Value Date    PROTIME 12.8 (H) 10/31/2018    INR 1.12 10/31/2018    APTT 29.7 10/31/2018    DDIMER <200 05/17/2017     Anemia Panel:  Lab Results   Component Value Date    RIZMLMSH77 228.6 05/17/2017    FOLATE >20.0 (H) 05/17/2017     Tumor Markers:  No results found for: , CEA, , LABCA2, PSA  Observations:  No data recorded      Assessment & Plan:   Right tibial intramedullary lesion and upper sternum lesion - with multiple lytic bone lesions - biopsy is consistent with multiple myeloma - s/p chemotherapy with Velcade, Revlimid and Dexamentasone - currently on maintenance Melphalan and prednisone. PLAN:  Ms. Oli Jackson has been followed for multiple myeloma. Since she is found to have recurrent multiple myeloma, we started chemotherapy with Velcade, Revlimid and dexamethasone since July 7, 2021. Zoledronic acid was started on July 7, 2021 and we will continue to give it every 12 week intervals. On December 1, 2021, she presented to me for followup. I have been following Ms. Bar for multiple myeloma.   She was on maintenance chemotherapy until May 7, 2017, and we stopped it after that since she could not tolerate maintenance chemotherapy because of significant myelosuppression. She was placed on under close observation. She noticed increase in size of her upper sternum lesion. It was decreased and back to normal size after chemo in 2016. I recognize that her serum lambda light chain has gradually increased over time. In view of her enlarging upper sternum lesion along with increasing lambda light chain, I believe she has recurrent multiple myeloma. I offered her to have repeat scans and bone marrow biopsy to confirm progression of the disease. However, she does not want to have repeat scans because it exacerbates her vitiligo. She also does not want to have biopsy at this moment. However, she agrees to start chemotherapy with Velcade, Revlimid and dexamethasone. Since her laboratory work-ups and clinical findings are consistent with recurrent multiple myeloma, we started first-line chemotherapy on July 7, 2021. Since she has been in remission for more than 4 years, we decided to rechallenge with velcade, revlimid and dexamethason. We stopped velcade after 9/8/2021 therapy since she could not tolerate it well. We decided to continue with revlimid and dexamethasone only since then. I recognize that her upper sternal mass is decreasing in size. I also recognize that her serum lambda light chain has been normal on 10/20/21 abd 11.17.21 blood test. Her serum creatinine was 0.8 mg/dl on 11/17/21. I recommend her to continue with current chemotherapy [Revlimid 10 mg 3 weeks on 1 week off and dexamethasone] for now on. I will repeat multiple myeloma work-ups again in 2 months and I will follow-up with the results. If she has progression of the disease in the future, I will consider to increase the dose of Revlimid. Edema of lower legs - recommend to continue with Lasix 20 mg daily as needed basis. I will continue to monitor her edema closely.     Revlimid induced thrombosis prophylaxis - I recommend her to continue with aspirin 81 mg daily. Herpes zoster prophylaxis - I recommend her to continue with acyclovir 400 mg twice daily. I also recommend to continue with zoledronic acid every twelve month intervals. For her hypertension, I recommend to continue with metoprolol 50 mg daily for now. Her blood pressure reading on today is normal. I also recommend her to continue with vitamin B12 supplementation. Health maintenance - I asked her to have age-appropriate cancer screening, exercise, low-fat and low-sodium diet. I answered all her questions and concerns for today. I asked her to follow up with primary care physician on regular basis. Recent imaging and labs were reviewed and discussed with the patient.

## 2021-12-01 ENCOUNTER — OFFICE VISIT (OUTPATIENT)
Dept: ONCOLOGY | Age: 71
End: 2021-12-01
Payer: MEDICARE

## 2021-12-01 ENCOUNTER — HOSPITAL ENCOUNTER (OUTPATIENT)
Dept: INFUSION THERAPY | Age: 71
Discharge: HOME | End: 2021-12-01
Payer: MEDICARE

## 2021-12-01 VITALS
HEIGHT: 66 IN | TEMPERATURE: 98.3 F | DIASTOLIC BLOOD PRESSURE: 63 MMHG | HEART RATE: 65 BPM | SYSTOLIC BLOOD PRESSURE: 123 MMHG | BODY MASS INDEX: 33.4 KG/M2 | OXYGEN SATURATION: 95 % | WEIGHT: 207.8 LBS

## 2021-12-01 DIAGNOSIS — C90.00 MULTIPLE MYELOMA NOT HAVING ACHIEVED REMISSION (HCC): ICD-10-CM

## 2021-12-01 DIAGNOSIS — Z12.31 SCREENING MAMMOGRAM FOR BREAST CANCER: Primary | ICD-10-CM

## 2021-12-01 PROCEDURE — 1123F ACP DISCUSS/DSCN MKR DOCD: CPT | Performed by: INTERNAL MEDICINE

## 2021-12-01 PROCEDURE — 4040F PNEUMOC VAC/ADMIN/RCVD: CPT | Performed by: INTERNAL MEDICINE

## 2021-12-01 PROCEDURE — 3017F COLORECTAL CA SCREEN DOC REV: CPT | Performed by: INTERNAL MEDICINE

## 2021-12-01 PROCEDURE — G8417 CALC BMI ABV UP PARAM F/U: HCPCS | Performed by: INTERNAL MEDICINE

## 2021-12-01 PROCEDURE — G8484 FLU IMMUNIZE NO ADMIN: HCPCS | Performed by: INTERNAL MEDICINE

## 2021-12-01 PROCEDURE — G8427 DOCREV CUR MEDS BY ELIG CLIN: HCPCS | Performed by: INTERNAL MEDICINE

## 2021-12-01 PROCEDURE — 1036F TOBACCO NON-USER: CPT | Performed by: INTERNAL MEDICINE

## 2021-12-01 PROCEDURE — 1090F PRES/ABSN URINE INCON ASSESS: CPT | Performed by: INTERNAL MEDICINE

## 2021-12-01 PROCEDURE — G8400 PT W/DXA NO RESULTS DOC: HCPCS | Performed by: INTERNAL MEDICINE

## 2021-12-01 PROCEDURE — 99214 OFFICE O/P EST MOD 30 MIN: CPT | Performed by: INTERNAL MEDICINE

## 2021-12-01 PROCEDURE — 99211 OFF/OP EST MAY X REQ PHY/QHP: CPT

## 2021-12-01 RX ORDER — DEXAMETHASONE 4 MG/1
TABLET ORAL
Qty: 30 TABLET | Refills: 2 | Status: SHIPPED | OUTPATIENT
Start: 2021-12-01 | End: 2022-06-17

## 2021-12-01 RX ORDER — LANOLIN ALCOHOL/MO/W.PET/CERES
CREAM (GRAM) TOPICAL
Qty: 90 TABLET | Refills: 1 | Status: SHIPPED | OUTPATIENT
Start: 2021-12-01 | End: 2022-05-31

## 2021-12-01 NOTE — PROGRESS NOTES
MA Rooming Questions  Patient: Agustin Mcqueen  MRN: [id number]    Date: 12/1/2021        1. Do you have any new issues?   no         2. Do you need any refills on medications? yes - Decadron and vit b12    3. Have you had any imaging done since your last visit?   no    4. Have you been hospitalized or seen in the emergency room since your last visit here?   no    5. Did the patient have a depression screening completed today?  No    No data recorded     PHQ-9 Given to (if applicable):               PHQ-9 Score (if applicable):                     [] Positive     []  Negative              Does question #9 need addressed (if applicable)                     [] Yes    []  No               Gudelia Light CMA

## 2021-12-10 ENCOUNTER — CASE MANAGEMENT (OUTPATIENT)
Dept: ONCOLOGY | Age: 71
End: 2021-12-10

## 2021-12-10 RX ORDER — LENALIDOMIDE 10 MG/1
CAPSULE ORAL
Qty: 21 CAPSULE | Refills: 0 | Status: SHIPPED | OUTPATIENT
Start: 2021-12-10 | End: 2022-01-06 | Stop reason: SDUPTHER

## 2021-12-10 NOTE — PROGRESS NOTES
Revlimid 10 mg chemo capsules reordered and sent to [facility]. Auth# [id number] obtained through [employer]. Auth is valid for 30 days.

## 2021-12-20 RX ORDER — HEPARIN SODIUM (PORCINE) LOCK FLUSH IV SOLN 100 UNIT/ML 100 UNIT/ML
500 SOLUTION INTRAVENOUS PRN
Status: CANCELLED | OUTPATIENT
Start: 2021-12-22

## 2021-12-20 RX ORDER — SODIUM CHLORIDE 9 MG/ML
25 INJECTION, SOLUTION INTRAVENOUS PRN
Status: CANCELLED | OUTPATIENT
Start: 2021-12-22

## 2021-12-20 RX ORDER — SODIUM CHLORIDE 9 MG/ML
INJECTION, SOLUTION INTRAVENOUS CONTINUOUS
Status: CANCELLED | OUTPATIENT
Start: 2021-12-22

## 2021-12-20 RX ORDER — METHYLPREDNISOLONE SODIUM SUCCINATE 125 MG/2ML
125 INJECTION, POWDER, LYOPHILIZED, FOR SOLUTION INTRAMUSCULAR; INTRAVENOUS ONCE
Status: CANCELLED | OUTPATIENT
Start: 2021-12-22 | End: 2021-12-22

## 2021-12-20 RX ORDER — SODIUM CHLORIDE 0.9 % (FLUSH) 0.9 %
5-40 SYRINGE (ML) INJECTION PRN
Status: CANCELLED | OUTPATIENT
Start: 2021-12-22

## 2021-12-20 RX ORDER — DIPHENHYDRAMINE HYDROCHLORIDE 50 MG/ML
50 INJECTION INTRAMUSCULAR; INTRAVENOUS ONCE
Status: CANCELLED | OUTPATIENT
Start: 2021-12-22 | End: 2021-12-22

## 2021-12-20 RX ORDER — EPINEPHRINE 1 MG/ML
0.3 INJECTION, SOLUTION, CONCENTRATE INTRAVENOUS PRN
Status: CANCELLED | OUTPATIENT
Start: 2021-12-22

## 2021-12-20 RX ORDER — SODIUM CHLORIDE 9 MG/ML
20 INJECTION, SOLUTION INTRAVENOUS ONCE
Status: CANCELLED | OUTPATIENT
Start: 2021-12-22 | End: 2021-12-22

## 2021-12-22 ENCOUNTER — HOSPITAL ENCOUNTER (OUTPATIENT)
Dept: INFUSION THERAPY | Age: 71
Discharge: HOME | End: 2021-12-22
Payer: MEDICARE

## 2021-12-22 VITALS
OXYGEN SATURATION: 100 % | SYSTOLIC BLOOD PRESSURE: 164 MMHG | TEMPERATURE: 97.9 F | DIASTOLIC BLOOD PRESSURE: 75 MMHG | HEIGHT: 66 IN | BODY MASS INDEX: 33.65 KG/M2 | RESPIRATION RATE: 18 BRPM | WEIGHT: 209.4 LBS | HEART RATE: 62 BPM

## 2021-12-22 DIAGNOSIS — C90.00 MULTIPLE MYELOMA NOT HAVING ACHIEVED REMISSION (HCC): Primary | ICD-10-CM

## 2021-12-22 LAB
ALBUMIN SERPL-MCNC: 3.9 GM/DL (ref 3.4–5)
ALP BLD-CCNC: 89 IU/L (ref 40–128)
ALT SERPL-CCNC: 14 U/L (ref 10–40)
ANION GAP SERPL CALCULATED.3IONS-SCNC: 13 MMOL/L (ref 4–16)
AST SERPL-CCNC: 16 IU/L (ref 15–37)
BILIRUB SERPL-MCNC: 0.3 MG/DL (ref 0–1)
BUN BLDV-MCNC: 18 MG/DL (ref 6–23)
CALCIUM SERPL-MCNC: 9.1 MG/DL (ref 8.3–10.6)
CHLORIDE BLD-SCNC: 104 MMOL/L (ref 99–110)
CO2: 24 MMOL/L (ref 21–32)
CREAT SERPL-MCNC: 0.8 MG/DL (ref 0.6–1.1)
GFR AFRICAN AMERICAN: >60 ML/MIN/1.73M2
GFR AFRICAN AMERICAN: >60 ML/MIN/1.73M2
GFR NON-AFRICAN AMERICAN: >60 ML/MIN/1.73M2
GFR NON-AFRICAN AMERICAN: >60 ML/MIN/1.73M2
GLUCOSE BLD-MCNC: 120 MG/DL (ref 70–99)
GLUCOSE BLD-MCNC: 129 MG/DL (ref 70–99)
POC BUN: 18 MG/DL (ref 8–26)
POC CALCIUM: 1.17 MMOL/L (ref 1.12–1.32)
POC CHLORIDE: 107 MMOL/L (ref 98–109)
POC CO2: 26 MMOL/L (ref 21–32)
POC CREATININE: 0.9 MG/DL (ref 0.6–1.1)
POTASSIUM SERPL-SCNC: 4.2 MMOL/L (ref 3.5–4.5)
POTASSIUM SERPL-SCNC: 4.4 MMOL/L (ref 3.5–5.1)
SODIUM BLD-SCNC: 141 MMOL/L (ref 135–145)
SODIUM BLD-SCNC: 141 MMOL/L (ref 138–146)
SOURCE, BLOOD GAS: ABNORMAL
TOTAL PROTEIN: 6.1 GM/DL (ref 6.4–8.2)

## 2021-12-22 PROCEDURE — 6360000002 HC RX W HCPCS: Performed by: INTERNAL MEDICINE

## 2021-12-22 PROCEDURE — 80053 COMPREHEN METABOLIC PANEL: CPT

## 2021-12-22 PROCEDURE — 96374 THER/PROPH/DIAG INJ IV PUSH: CPT

## 2021-12-22 PROCEDURE — 2580000003 HC RX 258: Performed by: INTERNAL MEDICINE

## 2021-12-22 RX ORDER — SODIUM CHLORIDE 9 MG/ML
20 INJECTION, SOLUTION INTRAVENOUS ONCE
Status: DISCONTINUED | OUTPATIENT
Start: 2021-12-22 | End: 2021-12-23 | Stop reason: HOSPADM

## 2021-12-22 RX ADMIN — SODIUM CHLORIDE 20 ML/HR: 9 INJECTION, SOLUTION INTRAVENOUS at 12:01

## 2021-12-22 RX ADMIN — ZOLEDRONIC ACID 4 MG: 4 INJECTION INTRAVENOUS at 12:01

## 2021-12-22 NOTE — PROGRESS NOTES
Pt seen for blood draw and zometa infusion. Lab work within parameters. Infusion administered per order, pt tolerated well. Pt denies changes or concerns for the provider at this time. AVS reviewed, all questions answered. Pt d/c in stable condition.

## 2021-12-29 ENCOUNTER — HOSPITAL ENCOUNTER (OUTPATIENT)
Dept: MAMMOGRAPHY | Age: 71
Discharge: HOME | End: 2021-12-29
Payer: MEDICARE

## 2021-12-29 DIAGNOSIS — Z12.31 SCREENING MAMMOGRAM FOR BREAST CANCER: ICD-10-CM

## 2021-12-29 PROCEDURE — 77067 SCR MAMMO BI INCL CAD: CPT

## 2022-01-06 DIAGNOSIS — C90.00 MULTIPLE MYELOMA NOT HAVING ACHIEVED REMISSION (HCC): Primary | ICD-10-CM

## 2022-01-06 RX ORDER — LENALIDOMIDE 10 MG/1
CAPSULE ORAL
Qty: 21 CAPSULE | Refills: 0 | Status: SHIPPED | OUTPATIENT
Start: 2022-01-06 | End: 2022-02-02 | Stop reason: SDUPTHER

## 2022-01-06 NOTE — PROGRESS NOTES
Revlimid 10 mg chemo capsules reordered and sent to [facility]. Auth# [id number] obtained through [employer]. Auth is valid for 30 days.

## 2022-01-26 ENCOUNTER — HOSPITAL ENCOUNTER (OUTPATIENT)
Dept: INFUSION THERAPY | Age: 72
Discharge: HOME | End: 2022-01-26
Payer: MEDICARE

## 2022-01-26 DIAGNOSIS — C90.00 MULTIPLE MYELOMA NOT HAVING ACHIEVED REMISSION (HCC): ICD-10-CM

## 2022-01-26 LAB
BASOPHILS ABSOLUTE: 0 K/CU MM
BASOPHILS RELATIVE PERCENT: 0.3 % (ref 0–1)
DIFFERENTIAL TYPE: ABNORMAL
EOSINOPHILS ABSOLUTE: 0 K/CU MM
EOSINOPHILS RELATIVE PERCENT: 0.3 % (ref 0–3)
ERYTHROCYTE SEDIMENTATION RATE: 23 MM/HR (ref 0–30)
HCT VFR BLD CALC: 33.3 % (ref 37–47)
HEMOGLOBIN: 10.3 GM/DL (ref 12.5–16)
IGA: 202 MG/DL (ref 69–382)
IGG,SERUM: 778 MG/DL (ref 723–1685)
IGM,SERUM: 48 MG/DL (ref 62–277)
LACTATE DEHYDROGENASE: 178 IU/L (ref 120–246)
LYMPHOCYTES ABSOLUTE: 0.5 K/CU MM
LYMPHOCYTES RELATIVE PERCENT: 14.8 % (ref 24–44)
MCH RBC QN AUTO: 28.7 PG (ref 27–31)
MCHC RBC AUTO-ENTMCNC: 30.9 % (ref 32–36)
MCV RBC AUTO: 92.8 FL (ref 78–100)
MONOCYTES ABSOLUTE: 0.1 K/CU MM
MONOCYTES RELATIVE PERCENT: 3.3 % (ref 0–4)
PDW BLD-RTO: 17.6 % (ref 11.7–14.9)
PLATELET # BLD: 179 K/CU MM (ref 140–440)
PMV BLD AUTO: 10.3 FL (ref 7.5–11.1)
RBC # BLD: 3.59 M/CU MM (ref 4.2–5.4)
SEGMENTED NEUTROPHILS ABSOLUTE COUNT: 2.5 K/CU MM
SEGMENTED NEUTROPHILS RELATIVE PERCENT: 81.3 % (ref 36–66)
WBC # BLD: 3.1 K/CU MM (ref 4–10.5)

## 2022-01-26 PROCEDURE — 83615 LACTATE (LD) (LDH) ENZYME: CPT

## 2022-01-26 PROCEDURE — 84165 PROTEIN E-PHORESIS SERUM: CPT

## 2022-01-26 PROCEDURE — 82232 ASSAY OF BETA-2 PROTEIN: CPT

## 2022-01-26 PROCEDURE — 36415 COLL VENOUS BLD VENIPUNCTURE: CPT

## 2022-01-26 PROCEDURE — 85652 RBC SED RATE AUTOMATED: CPT

## 2022-01-26 PROCEDURE — 82784 ASSAY IGA/IGD/IGG/IGM EACH: CPT

## 2022-01-26 PROCEDURE — 83883 ASSAY NEPHELOMETRY NOT SPEC: CPT

## 2022-01-26 PROCEDURE — 86320 SERUM IMMUNOELECTROPHORESIS: CPT

## 2022-01-26 PROCEDURE — 85025 COMPLETE CBC W/AUTO DIFF WBC: CPT

## 2022-01-28 LAB
ALBUMIN ELP: 3.4 GM/DL (ref 3.2–5.6)
ALPHA-1-GLOBULIN: 0.3 GM/DL (ref 0.1–0.4)
ALPHA-2-GLOBULIN: 0.8 GM/DL (ref 0.4–1.2)
BETA GLOBULIN: 1.1 GM/DL (ref 0.5–1.3)
BETA-2 MICROGLOBULIN: 2.6 MG/L (ref 1.1–2.4)
GAMMA GLOBULIN: 0.9 GM/DL (ref 0.5–1.6)
KAPPA QUANT FREE LIGHT CHAINS: 26.06 MG/L (ref 3.3–19.4)
KAPPA/LAMBDA FREE LIGHT CHAIN RATIO: 1.16 (ref 0.26–1.65)
LAMBDA FREE LIGHT CHAINS QNT: 22.48 MG/L (ref 5.71–26.3)
SPEP INTERPRETATION: NORMAL
SPEP INTERPRETATION: NORMAL
TOTAL PROTEIN: 6.5 GM/DL (ref 6.4–8.2)

## 2022-01-31 NOTE — PROGRESS NOTES
Patient Name: Cherylene Angelucci  Patient : 1950  Patient MRN: [id number]     Primary Oncologist: Parth Lara MD  Referring Provider: Leah Dee MD     Date of Service: 2022      Chief Complaint:   Chief Complaint   Patient presents with    Follow-up     Patient Active Problem List:     Multiple myeloma not having achieved remission     HPI:   Ms. Joselito Polo is a 62-year-old very pleasant patient with a medical history significant for stage II cervical cancer diagnosed in , status post laser surgery, gastroesophageal reflux disease, initially referred to me on 2014 for evaluation of right tibial intramedullary lesion. She stated that she has been having right leg pain since 2014, which has gradually been getting worse over time. She was seen by her primary care physician and had x-ray on 2014. It showed abnormal lucency within the tibial shaft at the junction of the mid and proximal third. MRI of the right lower extremity was done on 2014, and it showed marrow replacing 5.8 cm intramedullary lesion in the right mid tibial shaft with cortical breakthrough and adjacent periostitis. This corresponds to the lytic/lucent lesion on the recent x-ray. Differential considerations include lytic metastatic disease or multiple myeloma. She was subsequently referred to me for evaluation. She had upper sternum tumor for the last four years, which is about 8 by 10 cm in diameter. She otherwise does not have any other significant symptoms. 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 It is too small to measure the quantity. Her ESR was mildly elevated to 49.       Laboratory results done on September 3, 2014, showed normal CBC, mild elevation in serum creatinine (1.3), normal calcium (9.8), normal total protein (7.4), beta-2 microglobulin (4.2), triclonal gammopathy (2 IgA Lambda and 1 free Lambda light chain) on serum protein electrophoresis, two of which are large enough to measure on serum protein electrophoresis and which together total 300 mg/dl, ESR (60). Quantitative immunoglobulin IgA was elevated (760), IgM (41 mg/dl), and IgG (926). She also has 3.4 grams of protein in 23 hour urine and urine immunoelectrophoresis is consistent with monoclonal free Lambda light chains. She had biopsy of the right tibial lesions and sternal lesions on August 29, 2014 and final pathology was consistent with plasma cell myeloma. Flow cytometry was consistent with 23 percent of CD56 positive clonal plasma cell population, consistent with plasma cell neoplasms. FISH study showed \"Abnormal Myeloma FISH Profile. Monosomy for chromosome 13. Aneuploid population: Gain of chromosomes 15 and FGFR3/4p\". Cytogenetics wasn't performed by laboratory. Complete bone survey was done on September 3, 2014, and it showed multiple lucent lesions involving the visualized bony skeleton concerning for metastatic disease. This involves the skull, T10 vertebral body, left humerus, and right femur. Faint opacity overlying the left upper lobe could be related to atelectasis, mass, and/or infiltrate. Followup chest radiograph is recommended to assure stability/resolution. The lungs are under aerated, which limits evaluation. I believe faint opacity in the left upper lobe is most likely due to upper sternal lesion. Chemotherapy with Velcade, Revlimid and dexamethasone was started on September 16, 2014. Palliative radiation therapy to the right tibia lesion was also started by Dr. Jed Jarrell on September 29, 2014 and she completed it on October 10, 2014.     Her serum free Lambda light chain on November 4, 2014, after the second cycle of chemotherapy was 1.25 (normal), after the first cycle of chemotherapy it was 4.32 (October 1, 2014), and decreased from pretreatment value of 141 on September 3, 2014. Her 24-hour urine protein has decreased to 90 mg per 24 hour on November 25, 2014, after third cycle of chemotherapy. It has decreased from 3.4 grams before chemotherapy. Ms. Zonia Olivarez completed her eight cycle of chemotherapy with Velcade, Revlimid and dexamethasone on February 27, 2015. Maintenance chemotherapy with Velcade was started on March 17, 2015. Since Ms. Zonia Olivarez developed significant neuropathy from maintenance chemotherapy with Velcade, we stopped Velcade on July 19, 2016. Since she has minimal residual disease and she is not considering stem-cell transplantation, I recommend to start melphalan and dexamethasone as a maintenance chemotherapy. It was started since August 11, 2016 and we stopped it on November 18, 2016, since melphalan is not available as of November 16, 2016. Since we could not get melphalan, we changed her chemotherapy to Revlimid and dexamethasone. She has been on Revlimid until January 18, 2017. I stopped the Revlimid since she could not tolerate Revlimid and dexamethasone very well. She also developed right lower extremity deep vein thrombosis secondary to the Revlimid. Anticoagulation therapy with Eliquis was started since January 18, 2017. We resumed her back on melphalan and prednisone since January 18, 2017. Eliquis was stopped on May 17, 2017, since she has been on Eliquis for about four months duration. Her D-dimer level done on May 17, 2017, was also within the normal range. Ms. Zonia Olivarez was found to have leukopenia, anemia, and thrombocytopenia on a blood test done on May 17, 2017, and I believe it is due to chemotherapy. Her chemotherapy was held since May 17, 2017. Since she is found to have recurrent multiple myeloma, we started chemotherapy with Velcade, Revlimid and dexamethasone since July 7, 2021.   Zoledronic acid was started on July 7, 2021 and we will continue to give it every 12 week intervals. We stopped velcade after 9/8/2021 therapy since she could not tolerate it well. We decided to continue with revlimid and dexamethasone only since then. On February 2, 2022, she presented to me for followup. I have been following Ms. Bar for multiple myeloma. She was on maintenance chemotherapy until May 7, 2017, and we stopped it after that since she could not tolerate maintenance chemotherapy because of significant myelosuppression. She was placed on under close observation. She noticed increase in size of her upper sternum lesion. It was decreased and back to normal size after chemo in 2016. I recognize that her serum lambda light chain has gradually increased over time. In view of her enlarging upper sternum lesion along with increasing lambda light chain, I believe she has recurrent multiple myeloma. I offered her to have repeat scans and bone marrow biopsy to confirm progression of the disease. However, she does not want to have repeat scans because it exacerbates her vitiligo. She also does not want to have biopsy at this moment. However, she agrees to start chemotherapy with Velcade, Revlimid and dexamethasone. Since her laboratory work-ups and clinical findings are consistent with recurrent multiple myeloma, we started first-line chemotherapy on July 7, 2021. Since she has been in remission for more than 4 years, we decided to rechallenge with velcade, revlimid and dexamethason. We stopped velcade after 9/8/2021 therapy since she could not tolerate it well. We decided to continue with revlimid and dexamethasone only since then. I recognize that her upper sternal mass is decreasing in size. I also recognize that her serum lambda light chain has been normal on 10/20/21, 11/17/21 and 1/26/22blood test. Her serum creatinine was 0.8 mg/dl on 12/22/21.      I recommend her to continue with current chemotherapy [Revlimid 10 mg 3 weeks on 1 week off and dexamethasone] for now on. I will repeat multiple myeloma work-ups again in 2 months and I will follow-up with the results. If she has progression of the disease in the future, I will consider to increase the dose of Revlimid. Revlimid induced diarrhea - not well controlled by imodium. Will start lomotil 1 tablet Q6 prn today. Will monitor her symptom. Edema of lower legs - recommend to continue with Lasix 20 mg daily as needed basis. I will continue to monitor her edema closely. Revlimid induced thrombosis prophylaxis - I recommend her to continue with aspirin 81 mg daily. Herpes zoster prophylaxis - I recommend her to continue with acyclovir 400 mg twice daily. I also recommend to continue with zoledronic acid every twelve month intervals. For her hypertension, I recommend to continue with metoprolol 50 mg daily for now. Her blood pressure reading on today is normal. I also recommend her to continue with vitamin B12 supplementation. Health maintenance - I asked her to have age-appropriate cancer screening, exercise, low-fat and low-sodium diet. I will continue to monitor her blood pressure closely. PAST MEDICAL HISTORY:  Past medical history is significant for the following. 1. Gastroesophageal reflux disease. 2. Stage II cervical cancer diagnosed in 1980, status post laser surgery. PAST SURGICAL HISTORY:  Past surgical history is significant for the following. 1. Cholecystectomy in 1998.  2. Tonsillectomy and appendectomy. 3. Tubal ligation in 1981. 4. Cheloid removal in October 2002. FAMILY HISTORY:  Family history is significant for small cell lung cancer in her mother. No other family history of cancer disease. SOCIAL HISTORY:  She denies smoking, alcohol drinking, and illicit drug abuse. She is  for 11 years and has two children. She is currently working at [employer].     ALLERGIES:  She claimed to have allergies to aspirin and iron. Review of Systems: \"Per interval history; otherwise 10 point ROS is negative. \"   Her energy level is stable and her sleep is good. She doesn't have fever, chills, night sweats, cough, shortness of breath, chest pain, hemoptysis or palpitations. Her bowel and bladder functions are normal. She denies nausea, vomiting, abdominal pain, diarrhea, constipation, dysuria, loss of appetite or weight loss. She doesn't have neuropathy and she denies bleeding or clotting issues. She doesn't have any pain in her body. Denies anxiety or depression. The rest of the systems are unremarkable. Vital Signs:  BP (!) 140/89 (Site: Right Lower Arm, Position: Sitting, Cuff Size: Medium Adult)   Pulse 58   Temp 97.2 °F (36.2 °C) (Temporal)   Ht 5' 6\" (1.676 m)   Wt 203 lb (92.1 kg)   SpO2 96%   BMI 32.77 kg/m²     Physical Exam:  CONSTITUTIONAL: awake, alert, cooperative, no apparent distress   EYES: pupils equal, round and reactive to light, sclera clear and conjunctiva normal  ENT: Normocephalic, without obvious abnormality, atraumatic  NECK: supple, symmetrical, no jugular venous distension and no carotid bruits   HEMATOLOGIC/LYMPHATIC: no cervical, supraclavicular or axillary lymphadenopathy   LUNGS: VBS, no wheezes, clear to auscultation, no rhonchi, no increased work of breathing, no crackles,     CARDIOVASCULAR: regular rate and rhythm, normal S1 and S2, no murmur noted  ABDOMEN: normal bowel sounds x 4, non-tender, no masses palpated, no hepatosplenomegaly,  soft, non-distended,   MUSCULOSKELETAL: full range of motion noted, tone is normal  NEUROLOGIC: awake, alert, oriented to name, place and time. Motor skills grossly intact.   SKIN: Normal skin color, texture, turgor and no jaundice.  appears intact   EXTREMITIES: no LE edema, no cyanosis, no clubbing, no leg swelling,      Labs:  Hematology:  Lab Results   Component Value Date    WBC 3.1 (L) 01/26/2022    RBC 3.59 (L) 01/26/2022    HGB 10.3 (L) 01/26/2022    HCT 33.3 (L) 01/26/2022    MCV 92.8 01/26/2022    MCH 28.7 01/26/2022    MCHC 30.9 (L) 01/26/2022    RDW 17.6 (H) 01/26/2022     01/26/2022    MPV 10.3 01/26/2022    BANDSPCT 5 03/08/2017    SEGSPCT 81.3 (H) 01/26/2022    EOSRELPCT 0.3 01/26/2022    BASOPCT 0.3 01/26/2022    LYMPHOPCT 14.8 (L) 01/26/2022    MONOPCT 3.3 01/26/2022    BANDABS 0.12 03/08/2017    SEGSABS 2.5 01/26/2022    EOSABS 0.0 01/26/2022    BASOSABS 0.0 01/26/2022    LYMPHSABS 0.5 01/26/2022    MONOSABS 0.1 01/26/2022    DIFFTYPE AUTOMATED DIFFERENTIAL 01/26/2022     Lab Results   Component Value Date    ESR 23 01/26/2022     Chemistry:  Lab Results   Component Value Date     12/22/2021    K 4.2 12/22/2021     12/22/2021    CO2 24 12/22/2021    BUN 18 12/22/2021    CREATININE 0.9 12/22/2021    GLUCOSE 120 (H) 12/22/2021    CALCIUM 9.1 12/22/2021    PROT 6.5 01/26/2022    LABALBU 3.9 12/22/2021    BILITOT 0.3 12/22/2021    ALKPHOS 89 12/22/2021    AST 16 12/22/2021    ALT 14 12/22/2021    LABGLOM >60 12/22/2021    GFRAA >60 12/22/2021    POCCA 1.17 12/22/2021    POCGLU 129 (H) 12/22/2021     Lab Results   Component Value Date     01/26/2022     No components found for: LD  Lab Results   Component Value Date    TSHHS 2.340 10/31/2018     Immunology:  Lab Results   Component Value Date    PROT 6.5 01/26/2022    SPEP  01/26/2022     INTERPRETATION - No monoclonal bands are detected. SAF    SPEP INTERPRETATION - Within normal limits.  SAF 01/26/2022    ALBUMINELP 3.4 01/26/2022    LABALPH 0.3 01/26/2022    LABALPH 0.8 01/26/2022    LABBETA 1.1 01/26/2022    GAMGLOB 0.9 01/26/2022     Lab Results   Component Value Date    KAPPAUVOL 26.06 (H) 01/26/2022    LAMBDAUVOL 67.21 (H) 11/11/2020    KLFLCR 1.16 01/26/2022     Lab Results   Component Value Date    B2M 2.6 (H) 01/26/2022     Coagulation Panel:  Lab Results   Component Value Date    PROTIME 12.8 (H) 10/31/2018    INR 1.12 10/31/2018 APTT 29.7 10/31/2018    DDIMER <200 05/17/2017     Anemia Panel:  Lab Results   Component Value Date    GVEVFNCT58 228.6 05/17/2017    FOLATE >20.0 (H) 05/17/2017     Tumor Markers:  No results found for: , CEA, , LABCA2, PSA  Observations:  PHQ-9 Total Score: 0 (2/2/2022  9:52 AM)      Assessment & Plan:   Right tibial intramedullary lesion and upper sternum lesion  with multiple lytic bone lesions - biopsy is consistent with multiple myeloma - s/p chemotherapy with Velcade, Revlimid and Dexamentasone - currently on maintenance Melphalan and prednisone. PLAN:  Ms. Domo Conrad has been followed for multiple myeloma. Since she is found to have recurrent multiple myeloma, we started chemotherapy with Velcade, Revlimid and dexamethasone since July 7, 2021. Zoledronic acid was started on July 7, 2021 and we will continue to give it every 12 week intervals. On February 2, 2022, she presented to me for followup. I have been following Ms. Bar for multiple myeloma. She was on maintenance chemotherapy until May 7, 2017, and we stopped it after that since she could not tolerate maintenance chemotherapy because of significant myelosuppression. She was placed on under close observation. She noticed increase in size of her upper sternum lesion. It was decreased and back to normal size after chemo in 2016. I recognize that her serum lambda light chain has gradually increased over time. In view of her enlarging upper sternum lesion along with increasing lambda light chain, I believe she has recurrent multiple myeloma. I offered her to have repeat scans and bone marrow biopsy to confirm progression of the disease. However, she does not want to have repeat scans because it exacerbates her vitiligo. She also does not want to have biopsy at this moment. However, she agrees to start chemotherapy with Velcade, Revlimid and dexamethasone.   Since her laboratory work-ups and clinical findings are consistent with recurrent multiple myeloma, we started first-line chemotherapy on July 7, 2021. Since she has been in remission for more than 4 years, we decided to rechallenge with velcade, revlimid and dexamethason. We stopped velcade after 9/8/2021 therapy since she could not tolerate it well. We decided to continue with revlimid and dexamethasone only since then. I recognize that her upper sternal mass is decreasing in size. I also recognize that her serum lambda light chain has been normal on 10/20/21, 11/17/21 and 1/26/22blood test. Her serum creatinine was 0.8 mg/dl on 12/22/21. I recommend her to continue with current chemotherapy [Revlimid 10 mg 3 weeks on 1 week off and dexamethasone] for now on. I will repeat multiple myeloma work-ups again in 2 months and I will follow-up with the results. If she has progression of the disease in the future, I will consider to increase the dose of Revlimid. Revlimid induced diarrhea - not well controlled by imodium. Will start lomotil 1 tablet Q6 prn today. Will monitor her symptom. Edema of lower legs - recommend to continue with Lasix 20 mg daily as needed basis. I will continue to monitor her edema closely. Revlimid induced thrombosis prophylaxis - I recommend her to continue with aspirin 81 mg daily. Herpes zoster prophylaxis - I recommend her to continue with acyclovir 400 mg twice daily. I also recommend to continue with zoledronic acid every twelve month intervals. For her hypertension, I recommend to continue with metoprolol 50 mg daily for now. Her blood pressure reading on today is normal. I also recommend her to continue with vitamin B12 supplementation. Health maintenance - I asked her to have age-appropriate cancer screening, exercise, low-fat and low-sodium diet. I answered all her questions and concerns for today. I asked her to follow up with primary care physician on regular basis.     Recent imaging and labs were reviewed and discussed with the patient.

## 2022-02-02 ENCOUNTER — OFFICE VISIT (OUTPATIENT)
Dept: ONCOLOGY | Age: 72
End: 2022-02-02
Payer: MEDICARE

## 2022-02-02 ENCOUNTER — CLINICAL DOCUMENTATION (OUTPATIENT)
Dept: ONCOLOGY | Age: 72
End: 2022-02-02

## 2022-02-02 ENCOUNTER — HOSPITAL ENCOUNTER (OUTPATIENT)
Dept: INFUSION THERAPY | Age: 72
Discharge: HOME | End: 2022-02-02

## 2022-02-02 VITALS
HEIGHT: 66 IN | TEMPERATURE: 97.2 F | HEART RATE: 58 BPM | WEIGHT: 203 LBS | OXYGEN SATURATION: 96 % | BODY MASS INDEX: 32.62 KG/M2 | DIASTOLIC BLOOD PRESSURE: 89 MMHG | SYSTOLIC BLOOD PRESSURE: 140 MMHG

## 2022-02-02 DIAGNOSIS — C90.00 MULTIPLE MYELOMA NOT HAVING ACHIEVED REMISSION (HCC): Primary | ICD-10-CM

## 2022-02-02 PROCEDURE — 1123F ACP DISCUSS/DSCN MKR DOCD: CPT | Performed by: INTERNAL MEDICINE

## 2022-02-02 PROCEDURE — 1036F TOBACCO NON-USER: CPT | Performed by: INTERNAL MEDICINE

## 2022-02-02 PROCEDURE — G8484 FLU IMMUNIZE NO ADMIN: HCPCS | Performed by: INTERNAL MEDICINE

## 2022-02-02 PROCEDURE — G8400 PT W/DXA NO RESULTS DOC: HCPCS | Performed by: INTERNAL MEDICINE

## 2022-02-02 PROCEDURE — 3017F COLORECTAL CA SCREEN DOC REV: CPT | Performed by: INTERNAL MEDICINE

## 2022-02-02 PROCEDURE — 99214 OFFICE O/P EST MOD 30 MIN: CPT | Performed by: INTERNAL MEDICINE

## 2022-02-02 PROCEDURE — G8417 CALC BMI ABV UP PARAM F/U: HCPCS | Performed by: INTERNAL MEDICINE

## 2022-02-02 PROCEDURE — G8427 DOCREV CUR MEDS BY ELIG CLIN: HCPCS | Performed by: INTERNAL MEDICINE

## 2022-02-02 PROCEDURE — 1090F PRES/ABSN URINE INCON ASSESS: CPT | Performed by: INTERNAL MEDICINE

## 2022-02-02 PROCEDURE — 4040F PNEUMOC VAC/ADMIN/RCVD: CPT | Performed by: INTERNAL MEDICINE

## 2022-02-02 RX ORDER — DIPHENOXYLATE HYDROCHLORIDE AND ATROPINE SULFATE 2.5; .025 MG/1; MG/1
1 TABLET ORAL 4 TIMES DAILY PRN
Qty: 120 TABLET | Refills: 0 | Status: SHIPPED | OUTPATIENT
Start: 2022-02-02 | End: 2022-03-04

## 2022-02-02 RX ORDER — LENALIDOMIDE 10 MG/1
CAPSULE ORAL
Qty: 21 CAPSULE | Refills: 0 | Status: SHIPPED | OUTPATIENT
Start: 2022-02-02 | End: 2022-03-02

## 2022-02-02 ASSESSMENT — PATIENT HEALTH QUESTIONNAIRE - PHQ9
SUM OF ALL RESPONSES TO PHQ QUESTIONS 1-9: 0
SUM OF ALL RESPONSES TO PHQ9 QUESTIONS 1 & 2: 0
2. FEELING DOWN, DEPRESSED OR HOPELESS: 0
SUM OF ALL RESPONSES TO PHQ QUESTIONS 1-9: 0
1. LITTLE INTEREST OR PLEASURE IN DOING THINGS: 0
SUM OF ALL RESPONSES TO PHQ QUESTIONS 1-9: 0
SUM OF ALL RESPONSES TO PHQ QUESTIONS 1-9: 0

## 2022-02-02 NOTE — PROGRESS NOTES
Refill for Revlimid 10 mg capsules sent to [facility] with authorization # [id number]. Directions to take one capsule by mouth daily for 21 days, then 7 days off.

## 2022-02-02 NOTE — PROGRESS NOTES
Spoke with patient. Informed her that Revlimid was ordered and that this nurse had contacted [facility] [address] [place] [address] to arrange a delivery for Tuesday 02/08/22 which is the day before patient has to resume taking. [facility] informs this nurse that the patient will have to call to arrange delivery. Patient verbalized understanding and will call [facility] [address] [place] [address] to arrange delivery.

## 2022-02-02 NOTE — PROGRESS NOTES
MA Rooming Questions  Patient: Lori Bonilla  MRN: [id number]    Date: 2/2/2022        1. Do you have any new issues?   no         2. Do you need any refills on medications?    no    3. Have you had any imaging done since your last visit? yes - labs 01/26 and laurent 12/29    4. Have you been hospitalized or seen in the emergency room since your last visit here?   no    5. Did the patient have a depression screening completed today?  Yes    PHQ-9 Total Score: 0 (2/2/2022  9:52 AM)       PHQ-9 Given to (if applicable):               PHQ-9 Score (if applicable):                     [] Positive     []  Negative              Does question #9 need addressed (if applicable)                     [] Yes    []  No               Jacob Bellamy CMA

## 2022-03-02 ENCOUNTER — CLINICAL DOCUMENTATION (OUTPATIENT)
Dept: ONCOLOGY | Age: 72
End: 2022-03-02

## 2022-03-02 DIAGNOSIS — C90.00 MULTIPLE MYELOMA NOT HAVING ACHIEVED REMISSION (HCC): ICD-10-CM

## 2022-03-02 RX ORDER — LENALIDOMIDE 10 MG/1
CAPSULE ORAL
Qty: 21 CAPSULE | Refills: 0 | Status: ACTIVE | OUTPATIENT
Start: 2022-03-02 | End: 2022-03-07 | Stop reason: SDUPTHER

## 2022-03-02 NOTE — PROGRESS NOTES
[address] [facility] Update    Date: 03/02/22    Patient's prescription benefits are being verified for coverage. Status update to follow as soon as possible. Please call us with any questions at [phone] opt.  6.

## 2022-03-02 NOTE — PROGRESS NOTES
[facility]    Date: 03/02/22    Medication is currently being filled at [address] [facility] and has been routed there. Please call us with any questions at [phone] opt.  6.

## 2022-03-07 ENCOUNTER — CLINICAL DOCUMENTATION (OUTPATIENT)
Dept: ONCOLOGY | Age: 72
End: 2022-03-07

## 2022-03-07 DIAGNOSIS — C90.00 MULTIPLE MYELOMA NOT HAVING ACHIEVED REMISSION (HCC): ICD-10-CM

## 2022-03-07 RX ORDER — LENALIDOMIDE 10 MG/1
CAPSULE ORAL
Qty: 21 CAPSULE | Refills: 0 | Status: ACTIVE | OUTPATIENT
Start: 2022-03-07 | End: 2022-03-30 | Stop reason: SDUPTHER

## 2022-03-07 NOTE — PROGRESS NOTES
[facility]    Date: 03/07/22    Medication is currently being filled at [address] [facility] and has been routed there. Please call us with any questions at [phone] opt.  6.

## 2022-03-07 NOTE — PROGRESS NOTES
[facility] Update    Date: 03/07/22    Patient's prescription benefits are being verified for coverage. Status update to follow as soon as possible. Please call us with any questions at [phone] opt.  6.

## 2022-03-09 ENCOUNTER — CLINICAL DOCUMENTATION (OUTPATIENT)
Dept: ONCOLOGY | Age: 72
End: 2022-03-09

## 2022-03-09 DIAGNOSIS — C90.00 MULTIPLE MYELOMA NOT HAVING ACHIEVED REMISSION (HCC): Primary | ICD-10-CM

## 2022-03-09 NOTE — PROGRESS NOTES
Received VM from patient stating that she contacted [facility] to arrange delivery for Revlimid, but unfortunately, medication won't be shipped until 03/10/2022 with expected delivery of 03/11/2022. Patient tearful and frustrated regarding situation. Called [facility] at [phone] and spoke with [facility] ambassador to request an expedited delivery; however, she states that patient would need to call [phone] opt 2. Called patient at [phone] to notify and advised to call number provided. Patient called [facility] and was able to arrange for earlier shipment. Revlimid to be delivered tomorrow 03/10/2022. Patient appreciative of assistance in getting medication. This RN apologized for inconvenience and direct contact information provided should additional needs present. Patient voices understanding.

## 2022-03-14 RX ORDER — SODIUM CHLORIDE 9 MG/ML
20 INJECTION, SOLUTION INTRAVENOUS ONCE
Status: CANCELLED | OUTPATIENT
Start: 2022-03-16 | End: 2022-03-16

## 2022-03-14 RX ORDER — SODIUM CHLORIDE 9 MG/ML
INJECTION, SOLUTION INTRAVENOUS CONTINUOUS
Status: CANCELLED | OUTPATIENT
Start: 2022-03-16

## 2022-03-14 RX ORDER — SODIUM CHLORIDE 9 MG/ML
25 INJECTION, SOLUTION INTRAVENOUS PRN
Status: CANCELLED | OUTPATIENT
Start: 2022-03-16

## 2022-03-14 RX ORDER — EPINEPHRINE 1 MG/ML
0.3 INJECTION, SOLUTION, CONCENTRATE INTRAVENOUS PRN
Status: CANCELLED | OUTPATIENT
Start: 2022-03-16

## 2022-03-14 RX ORDER — SODIUM CHLORIDE 0.9 % (FLUSH) 0.9 %
5-40 SYRINGE (ML) INJECTION PRN
Status: CANCELLED | OUTPATIENT
Start: 2022-03-16

## 2022-03-14 RX ORDER — DIPHENHYDRAMINE HYDROCHLORIDE 50 MG/ML
50 INJECTION INTRAMUSCULAR; INTRAVENOUS ONCE
Status: CANCELLED | OUTPATIENT
Start: 2022-03-16 | End: 2022-03-16

## 2022-03-14 RX ORDER — HEPARIN SODIUM (PORCINE) LOCK FLUSH IV SOLN 100 UNIT/ML 100 UNIT/ML
500 SOLUTION INTRAVENOUS PRN
Status: CANCELLED | OUTPATIENT
Start: 2022-03-16

## 2022-03-14 RX ORDER — METHYLPREDNISOLONE SODIUM SUCCINATE 125 MG/2ML
125 INJECTION, POWDER, LYOPHILIZED, FOR SOLUTION INTRAMUSCULAR; INTRAVENOUS ONCE
Status: CANCELLED | OUTPATIENT
Start: 2022-03-16 | End: 2022-03-16

## 2022-03-16 ENCOUNTER — HOSPITAL ENCOUNTER (OUTPATIENT)
Dept: INFUSION THERAPY | Age: 72
Discharge: HOME | End: 2022-03-16
Payer: MEDICARE

## 2022-03-16 VITALS
OXYGEN SATURATION: 98 % | TEMPERATURE: 98.4 F | SYSTOLIC BLOOD PRESSURE: 133 MMHG | DIASTOLIC BLOOD PRESSURE: 57 MMHG | HEART RATE: 62 BPM | WEIGHT: 199.2 LBS | HEIGHT: 66 IN | BODY MASS INDEX: 32.02 KG/M2

## 2022-03-16 DIAGNOSIS — C90.00 MULTIPLE MYELOMA NOT HAVING ACHIEVED REMISSION (HCC): Primary | ICD-10-CM

## 2022-03-16 LAB
ALBUMIN SERPL-MCNC: 3.6 GM/DL (ref 3.4–5)
ALP BLD-CCNC: 103 IU/L (ref 40–128)
ALT SERPL-CCNC: 17 U/L (ref 10–40)
ANION GAP SERPL CALCULATED.3IONS-SCNC: 16 MMOL/L (ref 4–16)
AST SERPL-CCNC: 20 IU/L (ref 15–37)
BILIRUB SERPL-MCNC: 0.5 MG/DL (ref 0–1)
BUN BLDV-MCNC: 29 MG/DL (ref 6–23)
CALCIUM SERPL-MCNC: 9.3 MG/DL (ref 8.3–10.6)
CHLORIDE BLD-SCNC: 101 MMOL/L (ref 99–110)
CO2: 21 MMOL/L (ref 21–32)
CREAT SERPL-MCNC: 1.1 MG/DL (ref 0.6–1.1)
GFR AFRICAN AMERICAN: 59 ML/MIN/1.73M2
GFR AFRICAN AMERICAN: >60 ML/MIN/1.73M2
GFR NON-AFRICAN AMERICAN: 49 ML/MIN/1.73M2
GFR NON-AFRICAN AMERICAN: 54 ML/MIN/1.73M2
GLUCOSE BLD-MCNC: 127 MG/DL (ref 70–99)
GLUCOSE BLD-MCNC: 130 MG/DL (ref 70–99)
POC BUN: 31 MG/DL (ref 8–26)
POC CALCIUM: 1.18 MMOL/L (ref 1.12–1.32)
POC CHLORIDE: 106 MMOL/L (ref 98–109)
POC CO2: 26 MMOL/L (ref 21–32)
POC CREATININE: 1 MG/DL (ref 0.6–1.1)
POTASSIUM SERPL-SCNC: 4.9 MMOL/L (ref 3.5–5.1)
POTASSIUM SERPL-SCNC: 5 MMOL/L (ref 3.6–5.1)
SODIUM BLD-SCNC: 138 MMOL/L (ref 135–145)
SODIUM BLD-SCNC: 141 MMOL/L (ref 138–146)
SOURCE, BLOOD GAS: ABNORMAL
TOTAL PROTEIN: 6.3 GM/DL (ref 6.4–8.2)

## 2022-03-16 PROCEDURE — 96365 THER/PROPH/DIAG IV INF INIT: CPT

## 2022-03-16 PROCEDURE — 80053 COMPREHEN METABOLIC PANEL: CPT

## 2022-03-16 PROCEDURE — 2580000003 HC RX 258: Performed by: INTERNAL MEDICINE

## 2022-03-16 PROCEDURE — 6360000002 HC RX W HCPCS: Performed by: INTERNAL MEDICINE

## 2022-03-16 RX ORDER — SODIUM CHLORIDE 9 MG/ML
20 INJECTION, SOLUTION INTRAVENOUS ONCE
Status: DISCONTINUED | OUTPATIENT
Start: 2022-03-16 | End: 2022-03-17 | Stop reason: HOSPADM

## 2022-03-16 RX ADMIN — ZOLEDRONIC ACID 3.5 MG: 4 INJECTION, SOLUTION, CONCENTRATE INTRAVENOUS at 12:03

## 2022-03-16 NOTE — PROGRESS NOTES
Ambulated to infusion area, here today for zometa infusion. No concerns at this time. PIV placed in left AC, positive blood return noted. Labs drawn. Labs within defined limits. Chemo administered as ordered. Call light within reach. Tolerated infusion without incident. Discharge instructions provided. Patient RTC 03/30 for labs.

## 2022-03-30 DIAGNOSIS — C90.00 MULTIPLE MYELOMA NOT HAVING ACHIEVED REMISSION (HCC): ICD-10-CM

## 2022-03-30 RX ORDER — LENALIDOMIDE 10 MG/1
CAPSULE ORAL
Qty: 21 CAPSULE | Refills: 0 | Status: ACTIVE | OUTPATIENT
Start: 2022-03-30 | End: 2022-03-30 | Stop reason: SDUPTHER

## 2022-03-30 RX ORDER — LENALIDOMIDE 10 MG/1
CAPSULE ORAL
Qty: 21 CAPSULE | Refills: 0 | Status: ACTIVE | OUTPATIENT
Start: 2022-03-30 | End: 2022-04-27 | Stop reason: SDUPTHER

## 2022-03-30 NOTE — PROGRESS NOTES
[facility]. [email or web address]    Date: 03/30/22    Medication is currently being filled at [address] [facility] and has been routed there. Please call us with any questions at [phone] opt.  6.

## 2022-03-30 NOTE — PROGRESS NOTES
[address] [facility] Update    Date: 03/30/22    Patient's prescription benefits are being verified for coverage. Status update to follow as soon as possible. Please call us with any questions at [phone] opt.  6.

## 2022-03-30 NOTE — PROGRESS NOTES
[address] [facility] [facility] Update    Date: 03/30/22    Patient's prescription benefits are being verified for coverage. Status update to follow as soon as possible. Please call us with any questions at [phone] opt.  6.

## 2022-03-31 ENCOUNTER — CLINICAL DOCUMENTATION (OUTPATIENT)
Dept: ONCOLOGY | Age: 72
End: 2022-03-31

## 2022-03-31 NOTE — PROGRESS NOTES
Received VM from patient regarding oral chemo. Patient states that every time she tries to call [facility], she ends up arguing with representative. Patient sounded upset and inquiring if she needs to call [facility] to set up shipment for medication. Called [facility] at [phone] and spoke with 2 different representatives, before being transferred to [facility], [facility]. Explained situation and expressed concerns that patient and myself have regarding patient getting the run around to receive oral chemo. [facility] confirmed that Revlimid order received and is ready to schedule for delivery. This RN placed on hold while Julia attempted to call patient to arrange delivery for medication; however, patient did not answer phone. [facility] states patient will need to call [phone] opt. 2 in order to set up Revlimid shipment and delivery. Called patient at [phone] to notify, no answer at this time. VM left. Advised that if patient has any trouble arranging delivery of Revlimid through [facility] to contact office and this RN will further assist. Patient already has this RN direct contact information.

## 2022-04-01 ENCOUNTER — CLINICAL DOCUMENTATION (OUTPATIENT)
Dept: ONCOLOGY | Age: 72
End: 2022-04-01

## 2022-04-01 NOTE — PROGRESS NOTES
Received VM from patient stating that she reached out to [facility] and was able to schedule delivery for Revlimid with no hassle. Patient appreciative and denies further needs at this time.

## 2022-04-06 ENCOUNTER — HOSPITAL ENCOUNTER (OUTPATIENT)
Dept: INFUSION THERAPY | Age: 72
Discharge: HOME | End: 2022-04-06
Payer: MEDICARE

## 2022-04-06 DIAGNOSIS — C90.00 MULTIPLE MYELOMA NOT HAVING ACHIEVED REMISSION (HCC): ICD-10-CM

## 2022-04-06 LAB
ALBUMIN SERPL-MCNC: 3.8 GM/DL (ref 3.4–5)
ALP BLD-CCNC: 121 IU/L (ref 40–128)
ALT SERPL-CCNC: 17 U/L (ref 10–40)
ANION GAP SERPL CALCULATED.3IONS-SCNC: 11 MMOL/L (ref 4–16)
AST SERPL-CCNC: 17 IU/L (ref 15–37)
BASOPHILS ABSOLUTE: 0.1 K/CU MM
BASOPHILS RELATIVE PERCENT: 2.2 % (ref 0–1)
BILIRUB SERPL-MCNC: 0.2 MG/DL (ref 0–1)
BUN BLDV-MCNC: 22 MG/DL (ref 6–23)
CALCIUM SERPL-MCNC: 9.1 MG/DL (ref 8.3–10.6)
CHLORIDE BLD-SCNC: 106 MMOL/L (ref 99–110)
CO2: 24 MMOL/L (ref 21–32)
CREAT SERPL-MCNC: 0.8 MG/DL (ref 0.6–1.1)
DIFFERENTIAL TYPE: ABNORMAL
EOSINOPHILS ABSOLUTE: 0 K/CU MM
EOSINOPHILS RELATIVE PERCENT: 0.9 % (ref 0–3)
ERYTHROCYTE SEDIMENTATION RATE: 38 MM/HR (ref 0–30)
GFR AFRICAN AMERICAN: >60 ML/MIN/1.73M2
GFR NON-AFRICAN AMERICAN: >60 ML/MIN/1.73M2
GLUCOSE BLD-MCNC: 114 MG/DL (ref 70–99)
HCT VFR BLD CALC: 33.8 % (ref 37–47)
HEMOGLOBIN: 10.4 GM/DL (ref 12.5–16)
IGA: 229 MG/DL (ref 69–382)
IGG,SERUM: 782 MG/DL (ref 723–1685)
IGM,SERUM: 33 MG/DL (ref 62–277)
LYMPHOCYTES ABSOLUTE: 0.7 K/CU MM
LYMPHOCYTES RELATIVE PERCENT: 23.3 % (ref 24–44)
MCH RBC QN AUTO: 28.4 PG (ref 27–31)
MCHC RBC AUTO-ENTMCNC: 30.8 % (ref 32–36)
MCV RBC AUTO: 92.3 FL (ref 78–100)
MONOCYTES ABSOLUTE: 0.1 K/CU MM
MONOCYTES RELATIVE PERCENT: 4.4 % (ref 0–4)
PDW BLD-RTO: 18.5 % (ref 11.7–14.9)
PLATELET # BLD: 280 K/CU MM (ref 140–440)
PMV BLD AUTO: 10.2 FL (ref 7.5–11.1)
POTASSIUM SERPL-SCNC: 4.4 MMOL/L (ref 3.5–5.1)
RBC # BLD: 3.66 M/CU MM (ref 4.2–5.4)
SEGMENTED NEUTROPHILS ABSOLUTE COUNT: 2.2 K/CU MM
SEGMENTED NEUTROPHILS RELATIVE PERCENT: 69.2 % (ref 36–66)
SODIUM BLD-SCNC: 141 MMOL/L (ref 135–145)
TOTAL PROTEIN: 6.4 GM/DL (ref 6.4–8.2)
WBC # BLD: 3.2 K/CU MM (ref 4–10.5)

## 2022-04-06 PROCEDURE — 85652 RBC SED RATE AUTOMATED: CPT

## 2022-04-06 PROCEDURE — 83883 ASSAY NEPHELOMETRY NOT SPEC: CPT

## 2022-04-06 PROCEDURE — 82232 ASSAY OF BETA-2 PROTEIN: CPT

## 2022-04-06 PROCEDURE — 85025 COMPLETE CBC W/AUTO DIFF WBC: CPT

## 2022-04-06 PROCEDURE — 82784 ASSAY IGA/IGD/IGG/IGM EACH: CPT

## 2022-04-06 PROCEDURE — 83615 LACTATE (LD) (LDH) ENZYME: CPT

## 2022-04-06 PROCEDURE — 86320 SERUM IMMUNOELECTROPHORESIS: CPT

## 2022-04-06 PROCEDURE — 80053 COMPREHEN METABOLIC PANEL: CPT

## 2022-04-06 PROCEDURE — 84165 PROTEIN E-PHORESIS SERUM: CPT

## 2022-04-06 PROCEDURE — 36415 COLL VENOUS BLD VENIPUNCTURE: CPT

## 2022-04-07 LAB
BETA-2 MICROGLOBULIN: 3.7 MG/L
LACTATE DEHYDROGENASE: 220 IU/L (ref 120–246)

## 2022-04-08 LAB
KAPPA QUANT FREE LIGHT CHAINS: 24.06 MG/L (ref 3.3–19.4)
KAPPA/LAMBDA FREE LIGHT CHAIN RATIO: 0.87 (ref 0.26–1.65)
LAMBDA FREE LIGHT CHAINS QNT: 27.78 MG/L (ref 5.71–26.3)

## 2022-04-10 NOTE — PROGRESS NOTES
Patient Name: Silke Ferrell  Patient : 1950  Patient MRN: [id number]     Primary Oncologist: Rusty Loredo MD  Referring Provider: Khurram Lam MD     Date of Service: 2022      Chief Complaint:   Chief Complaint   Patient presents with    Follow-up     Patient Active Problem List:     Multiple myeloma not having achieved remission     HPI:   Ms. Hilario Richard is a 61-year-old very pleasant patient with a medical history significant for stage II cervical cancer diagnosed in , status post laser surgery, gastroesophageal reflux disease, initially referred to me on 2014 for evaluation of right tibial intramedullary lesion. She stated that she has been having right leg pain since 2014, which has gradually been getting worse over time. She was seen by her primary care physician and had x-ray on 2014. It showed abnormal lucency within the tibial shaft at the junction of the mid and proximal third. MRI of the right lower extremity was done on 2014, and it showed marrow replacing 5.8 cm intramedullary lesion in the right mid tibial shaft with cortical breakthrough and adjacent periostitis. This corresponds to the lytic/lucent lesion on the recent x-ray. Differential considerations include lytic metastatic disease or multiple myeloma. She was subsequently referred to me for evaluation. She had upper sternum tumor for the last four years, which is about 8 by 10 cm in diameter. She otherwise does not have any other significant symptoms. 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 It is too small to measure the quantity. Her ESR was mildly elevated to 49.       Laboratory results done on September 3, 2014, showed normal CBC, mild elevation in serum creatinine (1.3), normal calcium (9.8), normal total protein (7.4), beta-2 microglobulin (4.2), triclonal gammopathy (2 IgA Lambda and 1 free Lambda light chain) on serum protein electrophoresis, two of which are large enough to measure on serum protein electrophoresis and which together total 300 mg/dl, ESR (60). Quantitative immunoglobulin IgA was elevated (760), IgM (41 mg/dl), and IgG (926). She also has 3.4 grams of protein in 23 hour urine and urine immunoelectrophoresis is consistent with monoclonal free Lambda light chains. She had biopsy of the right tibial lesions and sternal lesions on August 29, 2014 and final pathology was consistent with plasma cell myeloma. Flow cytometry was consistent with 23 percent of CD56 positive clonal plasma cell population, consistent with plasma cell neoplasms. FISH study showed \"Abnormal Myeloma FISH Profile. Monosomy for chromosome 13. Aneuploid population: Gain of chromosomes 15 and FGFR3/4p\". Cytogenetics wasn't performed by laboratory. Complete bone survey was done on September 3, 2014, and it showed multiple lucent lesions involving the visualized bony skeleton concerning for metastatic disease. This involves the skull, T10 vertebral body, left humerus, and right femur. Faint opacity overlying the left upper lobe could be related to atelectasis, mass, and/or infiltrate. Followup chest radiograph is recommended to assure stability/resolution. The lungs are under aerated, which limits evaluation. I believe faint opacity in the left upper lobe is most likely due to upper sternal lesion. Chemotherapy with Velcade, Revlimid and dexamethasone was started on September 16, 2014. Palliative radiation therapy to the right tibia lesion was also started by Dr. Sonia Peter on September 29, 2014 and she completed it on October 10, 2014.     Her serum free Lambda light chain on November 4, 2014, after the second cycle of chemotherapy was 1.25 (normal), after the first cycle of chemotherapy it was 4.32 (October 1, 2014), and decreased from pretreatment value of 141 on September 3, 2014. Her 24-hour urine protein has decreased to 90 mg per 24 hour on November 25, 2014, after third cycle of chemotherapy. It has decreased from 3.4 grams before chemotherapy. Ms. Wanda Quiroga completed her eight cycle of chemotherapy with Velcade, Revlimid and dexamethasone on February 27, 2015. Maintenance chemotherapy with Velcade was started on March 17, 2015. Since Ms. Wanda Quiroga developed significant neuropathy from maintenance chemotherapy with Velcade, we stopped Velcade on July 19, 2016. Since she has minimal residual disease and she is not considering stem-cell transplantation, I recommend to start melphalan and dexamethasone as a maintenance chemotherapy. It was started since August 11, 2016 and we stopped it on November 18, 2016, since melphalan is not available as of November 16, 2016. Since we could not get melphalan, we changed her chemotherapy to Revlimid and dexamethasone. She has been on Revlimid until January 18, 2017. I stopped the Revlimid since she could not tolerate Revlimid and dexamethasone very well. She also developed right lower extremity deep vein thrombosis secondary to the Revlimid. Anticoagulation therapy with Eliquis was started since January 18, 2017. We resumed her back on melphalan and prednisone since January 18, 2017. Eliquis was stopped on May 17, 2017, since she has been on Eliquis for about four months duration. Her D-dimer level done on May 17, 2017, was also within the normal range. Ms. Wanda Quiroga was found to have leukopenia, anemia, and thrombocytopenia on a blood test done on May 17, 2017, and I believe it is due to chemotherapy. Her chemotherapy was held since May 17, 2017. Since she is found to have recurrent multiple myeloma, we started chemotherapy with Velcade, Revlimid and dexamethasone since July 7, 2021.   Zoledronic acid was started on continue with current chemotherapy [Revlimid 10 mg 3 weeks on 1 week off and dexamethasone] for now on. I will repeat multiple myeloma work-ups again in 2 months and I will follow-up with the results. If she has progression of the disease in the future, I will consider to increase the dose of Revlimid. Revlimid induced diarrhea - not well controlled by imodium. Will start lomotil 1 tablet Q6 prn today. Will monitor her symptom. Edema of lower legs - recommend to continue with Lasix 20 mg daily as needed basis. I will continue to monitor her edema closely. Revlimid induced thrombosis prophylaxis - I recommend her to continue with aspirin 81 mg daily. Herpes zoster prophylaxis - I recommend her to continue with acyclovir 400 mg twice daily. I also recommend to continue with zoledronic acid every twelve month intervals. For her hypertension, I recommend to continue with metoprolol 50 mg daily for now. Her blood pressure reading on today is normal. I also recommend her to continue with vitamin B12 supplementation. Health maintenance - I asked her to have age-appropriate cancer screening, exercise, low-fat and low-sodium diet. I will continue to monitor her blood pressure closely. PAST MEDICAL HISTORY:  Past medical history is significant for the following. 1. Gastroesophageal reflux disease. 2. Stage II cervical cancer diagnosed in 1980, status post laser surgery. PAST SURGICAL HISTORY:  Past surgical history is significant for the following. 1. Cholecystectomy in 1998.  2. Tonsillectomy and appendectomy. 3. Tubal ligation in 1981. 4. Cheloid removal in October 2002. FAMILY HISTORY:  Family history is significant for small cell lung cancer in her mother. No other family history of cancer disease. SOCIAL HISTORY:  She denies smoking, alcohol drinking, and illicit drug abuse. She is  for 11 years and has two children.  She is currently working at [employer]. ALLERGIES:  She claimed to have allergies to aspirin and iron. Review of Systems: \"Per interval history; otherwise 10 point ROS is negative. \"   Her energy level is good and her sleep is fine. She denies fever, chills, night sweats, cough, shortness of breath, chest pain, hemoptysis or palpitations. Her bowel and bladder functions are normal. She doesn't have nausea, vomiting, abdominal pain, diarrhea, constipation, dysuria, loss of appetite or weight loss. She denies neuropathy and she doesn't have bleeding or clotting issues. She denies any pain in her body. No anxiety or depression. The rest of the systems are unremarkable. Vital Signs:  /61 (Site: Left Lower Arm, Position: Sitting, Cuff Size: Medium Adult)   Pulse 61   Temp 95.5 °F (35.3 °C) (Infrared)   Resp 16   Ht 5' 6\" (1.676 m)   Wt 199 lb (90.3 kg)   SpO2 98%   BMI 32.12 kg/m²     Physical Exam:  CONSTITUTIONAL: awake, alert, cooperative, no apparent distress   EYES: pupils equal, round and reactive to light, sclera clear and conjunctiva normal  ENT: Normocephalic, without obvious abnormality, atraumatic  NECK: supple, symmetrical, no jugular venous distension and no carotid bruits   HEMATOLOGIC/LYMPHATIC: no cervical, supraclavicular or axillary lymphadenopathy   LUNGS: VBS, no wheezes, no increased work of breathing, no crackles, clear to auscultation, no rhonchi,     CARDIOVASCULAR: regular rate and rhythm, normal S1 and S2, no murmur noted  ABDOMEN: normal bowel sounds x 4, non-tender, no masses palpated, no hepatosplenomegaly,  soft, non-distended,   MUSCULOSKELETAL: full range of motion noted, tone is normal  NEUROLOGIC: awake, alert, oriented to name, place and time. Motor skills grossly intact.   SKIN: Normal skin color, texture, turgor and no jaundice.  appears intact   EXTREMITIES: no cyanosis, no clubbing, no LE edema, no leg swelling,      Labs:  Hematology:  Lab Results   Component Value Date    WBC 3.2 (L) 04/06/2022    RBC 3.66 (L) 04/06/2022    HGB 10.4 (L) 04/06/2022    HCT 33.8 (L) 04/06/2022    MCV 92.3 04/06/2022    MCH 28.4 04/06/2022    MCHC 30.8 (L) 04/06/2022    RDW 18.5 (H) 04/06/2022     04/06/2022    MPV 10.2 04/06/2022    BANDSPCT 5 03/08/2017    SEGSPCT 69.2 (H) 04/06/2022    EOSRELPCT 0.9 04/06/2022    BASOPCT 2.2 (H) 04/06/2022    LYMPHOPCT 23.3 (L) 04/06/2022    MONOPCT 4.4 (H) 04/06/2022    BANDABS 0.12 03/08/2017    SEGSABS 2.2 04/06/2022    EOSABS 0.0 04/06/2022    BASOSABS 0.1 04/06/2022    LYMPHSABS 0.7 04/06/2022    MONOSABS 0.1 04/06/2022    DIFFTYPE AUTOMATED DIFFERENTIAL 04/06/2022     Lab Results   Component Value Date    ESR 38 (H) 04/06/2022     Chemistry:  Lab Results   Component Value Date     04/06/2022    K 4.4 04/06/2022     04/06/2022    CO2 24 04/06/2022    BUN 22 04/06/2022    CREATININE 0.8 04/06/2022    GLUCOSE 114 (H) 04/06/2022    CALCIUM 9.1 04/06/2022    PROT 6.4 04/06/2022    PROT 6.4 04/06/2022    LABALBU 3.8 04/06/2022    BILITOT 0.2 04/06/2022    ALKPHOS 121 04/06/2022    AST 17 04/06/2022    ALT 17 04/06/2022    LABGLOM >60 04/06/2022    GFRAA >60 04/06/2022    POCCA 1.18 03/16/2022    POCGLU 127 (H) 03/16/2022     Lab Results   Component Value Date     04/06/2022     No components found for: LD  Lab Results   Component Value Date    TSHHS 2.340 10/31/2018     Immunology:  Lab Results   Component Value Date    PROT 6.4 04/06/2022    PROT 6.4 04/06/2022    SPEP INTERPRETATION - No monoclonal bands are seen. SAF 04/06/2022    SPEP INTERPRETATION - Within normal limits.  SAF 04/06/2022    ALBUMINELP 3.1 (L) 04/06/2022    LABALPH 0.4 04/06/2022    LABALPH 0.9 04/06/2022    LABBETA 1.3 04/06/2022    GAMGLOB 0.8 04/06/2022     Lab Results   Component Value Date    KAPPAUVOL 24.06 (H) 04/06/2022    LAMBDAUVOL 67.21 (H) 11/11/2020    KLFLCR 0.87 04/06/2022     Lab Results   Component Value Date    B2M 3.7 (H) 04/06/2022     Coagulation Panel:  Lab Results   Component Value Date    PROTIME 12.8 (H) 10/31/2018    INR 1.12 10/31/2018    APTT 29.7 10/31/2018    DDIMER <200 05/17/2017     Anemia Panel:  Lab Results   Component Value Date    MFMRGATU22 228.6 05/17/2017    FOLATE >20.0 (H) 05/17/2017     Tumor Markers:  No results found for: , CEA, , LABCA2, PSA  Observations:  No data recorded      Assessment & Plan:   Right tibial intramedullary lesion and upper sternum lesion  with multiple lytic bone lesions - biopsy is consistent with multiple myeloma - s/p chemotherapy with Velcade, Revlimid and Dexamentasone - currently on maintenance Melphalan and prednisone. PLAN:  Ms. Cardell Duane has been followed for multiple myeloma. Since she is found to have recurrent multiple myeloma, we started chemotherapy with Velcade, Revlimid and dexamethasone since July 7, 2021. Zoledronic acid was started on July 7, 2021 and we will continue to give it every 12 week intervals. On April 13, 2022, she presented to me for followup. I have been following Ms. Bar for multiple myeloma. She was on maintenance chemotherapy until May 7, 2017, and we stopped it after that since she could not tolerate maintenance chemotherapy because of significant myelosuppression. She was placed on under close observation. She noticed increase in size of her upper sternum lesion. It was decreased and back to normal size after chemo in 2016. I recognize that her serum lambda light chain has gradually increased over time. In view of her enlarging upper sternum lesion along with increasing lambda light chain, I believe she has recurrent multiple myeloma. I offered her to have repeat scans and bone marrow biopsy to confirm progression of the disease. However, she does not want to have repeat scans because it exacerbates her vitiligo. She also does not want to have biopsy at this moment.     However, she agrees to start chemotherapy with Velcade, Revlimid and dexamethasone. Since her laboratory work-ups and clinical findings are consistent with recurrent multiple myeloma, we started first-line chemotherapy on July 7, 2021. Since she has been in remission for more than 4 years, we decided to rechallenge with velcade, revlimid and dexamethason. We stopped velcade after 9/8/2021 therapy since she could not tolerate it well. We decided to continue with revlimid and dexamethasone only since then. I recognize that her upper sternal mass is decreasing in size. I recognize that her serum lambda light chain has slightly increased on 4/6/22. It was normal on 10/20/21, 11/17/21 and 1/26/22blood test. Her serum creatinine was 0.8 mg/dl on 4/6/22. I recommend her to continue with current chemotherapy [Revlimid 10 mg 3 weeks on 1 week off and dexamethasone] for now on. I will repeat multiple myeloma work-ups again in 2 months and I will follow-up with the results. If she has progression of the disease in the future, I will consider to increase the dose of Revlimid. Revlimid induced diarrhea - not well controlled by imodium. Will start lomotil 1 tablet Q6 prn today. Will monitor her symptom. Edema of lower legs - recommend to continue with Lasix 20 mg daily as needed basis. I will continue to monitor her edema closely. Revlimid induced thrombosis prophylaxis - I recommend her to continue with aspirin 81 mg daily. Herpes zoster prophylaxis - I recommend her to continue with acyclovir 400 mg twice daily. I also recommend to continue with zoledronic acid every twelve month intervals. For her hypertension, I recommend to continue with metoprolol 50 mg daily for now. Her blood pressure reading on today is normal. I also recommend her to continue with vitamin B12 supplementation. Health maintenance - I asked her to have age-appropriate cancer screening, exercise, low-fat and low-sodium diet.      I answered all her questions and concerns for today. I asked her to follow up with primary care physician on regular basis. Recent imaging and labs were reviewed and discussed with the patient.

## 2022-04-11 LAB
ALBUMIN ELP: 3.1 GM/DL (ref 3.2–5.6)
ALPHA-1-GLOBULIN: 0.4 GM/DL (ref 0.1–0.4)
ALPHA-2-GLOBULIN: 0.9 GM/DL (ref 0.4–1.2)
BETA GLOBULIN: 1.3 GM/DL (ref 0.5–1.3)
GAMMA GLOBULIN: 0.8 GM/DL (ref 0.5–1.6)
SPEP INTERPRETATION: ABNORMAL
SPEP INTERPRETATION: NORMAL
TOTAL PROTEIN: 6.4 GM/DL (ref 6.4–8.2)

## 2022-04-13 ENCOUNTER — OFFICE VISIT (OUTPATIENT)
Dept: ONCOLOGY | Age: 72
End: 2022-04-13
Payer: MEDICARE

## 2022-04-13 ENCOUNTER — HOSPITAL ENCOUNTER (OUTPATIENT)
Dept: INFUSION THERAPY | Age: 72
Discharge: HOME | End: 2022-04-13

## 2022-04-13 VITALS
HEIGHT: 66 IN | RESPIRATION RATE: 16 BRPM | BODY MASS INDEX: 31.98 KG/M2 | SYSTOLIC BLOOD PRESSURE: 132 MMHG | HEART RATE: 61 BPM | TEMPERATURE: 95.5 F | DIASTOLIC BLOOD PRESSURE: 61 MMHG | WEIGHT: 199 LBS | OXYGEN SATURATION: 98 %

## 2022-04-13 DIAGNOSIS — C90.00 MULTIPLE MYELOMA NOT HAVING ACHIEVED REMISSION (HCC): Primary | ICD-10-CM

## 2022-04-13 PROCEDURE — 4040F PNEUMOC VAC/ADMIN/RCVD: CPT | Performed by: INTERNAL MEDICINE

## 2022-04-13 PROCEDURE — 1123F ACP DISCUSS/DSCN MKR DOCD: CPT | Performed by: INTERNAL MEDICINE

## 2022-04-13 PROCEDURE — 99214 OFFICE O/P EST MOD 30 MIN: CPT | Performed by: INTERNAL MEDICINE

## 2022-04-13 PROCEDURE — 1090F PRES/ABSN URINE INCON ASSESS: CPT | Performed by: INTERNAL MEDICINE

## 2022-04-13 PROCEDURE — G8417 CALC BMI ABV UP PARAM F/U: HCPCS | Performed by: INTERNAL MEDICINE

## 2022-04-13 PROCEDURE — 3017F COLORECTAL CA SCREEN DOC REV: CPT | Performed by: INTERNAL MEDICINE

## 2022-04-13 PROCEDURE — 1036F TOBACCO NON-USER: CPT | Performed by: INTERNAL MEDICINE

## 2022-04-13 PROCEDURE — G8427 DOCREV CUR MEDS BY ELIG CLIN: HCPCS | Performed by: INTERNAL MEDICINE

## 2022-04-13 PROCEDURE — G8400 PT W/DXA NO RESULTS DOC: HCPCS | Performed by: INTERNAL MEDICINE

## 2022-04-13 NOTE — PROGRESS NOTES
MA Rooming Questions  Patient: Vaishali Colon  MRN: [id number]    Date: 4/13/2022        1. Do you have any new issues?   no         2. Do you need any refills on medications?    no    3. Have you had any imaging done since your last visit?   no    4. Have you been hospitalized or seen in the emergency room since your last visit here?   no    5. Did the patient have a depression screening completed today?  No    No data recorded     PHQ-9 Given to (if applicable):               PHQ-9 Score (if applicable):                     [] Positive     []  Negative              Does question #9 need addressed (if applicable)                     [] Yes    []  No               Delio Nance MA

## 2022-04-27 DIAGNOSIS — C90.00 MULTIPLE MYELOMA NOT HAVING ACHIEVED REMISSION (HCC): ICD-10-CM

## 2022-04-27 RX ORDER — LENALIDOMIDE 10 MG/1
CAPSULE ORAL
Qty: 21 CAPSULE | Refills: 0 | Status: ACTIVE | OUTPATIENT
Start: 2022-04-27 | End: 2022-05-24 | Stop reason: SDUPTHER

## 2022-04-27 NOTE — PROGRESS NOTES
[facility] Update    Date: 04/27/22    Patient's prescription benefits are being verified for coverage. Status update to follow as soon as possible. Please call us with any questions at [phone] opt.  2.

## 2022-04-27 NOTE — PROGRESS NOTES
Received VM from patient stating she took last oral chemo pill yesterday 04/26/2022 and will need Revlimid reordered. Revlimid 10 mg chemo capsules reordered and sent to [facility] [address] Kenton Shone # [id number] obtained through [employer] risk management/[employer] portal. Auth valid for 30 days. Pharmacy to call patient to set up shipment and delivery. Called patient back at [phone] to notify. Voices understanding.

## 2022-04-27 NOTE — PROGRESS NOTES
55 A. [facility] Update    Date: 04/27/22    Medication is currently being filled at [address] [facility] and has been routed there. Please call us with any questions at [phone] opt.  2.

## 2022-05-24 DIAGNOSIS — C90.00 MULTIPLE MYELOMA NOT HAVING ACHIEVED REMISSION (HCC): ICD-10-CM

## 2022-05-24 RX ORDER — LENALIDOMIDE 10 MG/1
CAPSULE ORAL
Qty: 21 CAPSULE | Refills: 0 | Status: ACTIVE | OUTPATIENT
Start: 2022-05-24 | End: 2022-06-23

## 2022-05-24 NOTE — PROGRESS NOTES
Revlimid 10 mg chemo capsules reordered and sent to [facility]. Sue Borden # [id number] obtained through [employer] risk management/[employer] portal. Auth valid for 30 days.

## 2022-05-24 NOTE — PROGRESS NOTES
55 A. [facility] Update    Date: 05/24/22    Medication is currently being filled at [address] [place] [address] and has been routed there. Please call us with any questions at [phone] opt.  2.

## 2022-05-26 ENCOUNTER — CLINICAL DOCUMENTATION (OUTPATIENT)
Dept: ONCOLOGY | Age: 72
End: 2022-05-26

## 2022-05-26 RX ORDER — FUROSEMIDE 20 MG/1
20 TABLET ORAL DAILY
Qty: 60 TABLET | Refills: 3 | Status: SHIPPED | OUTPATIENT
Start: 2022-05-26 | End: 2022-10-05 | Stop reason: SDUPTHER

## 2022-05-26 NOTE — PROGRESS NOTES
Received VM from patient c/o swelling in feet/legs and requesting water pill. RX for lasix 20 mg PO daily PRN sent to [address] [facility]. Attempted to call patient at [phone] to notify; however, no answer. VM left. This RN direct number provided should any additional needs or questions present.

## 2022-05-31 RX ORDER — LANOLIN ALCOHOL/MO/W.PET/CERES
CREAM (GRAM) TOPICAL
Qty: 90 TABLET | Refills: 1 | Status: SHIPPED | OUTPATIENT
Start: 2022-05-31

## 2022-06-02 DIAGNOSIS — C90.00 MULTIPLE MYELOMA NOT HAVING ACHIEVED REMISSION (HCC): Primary | ICD-10-CM

## 2022-06-07 RX ORDER — DIPHENHYDRAMINE HYDROCHLORIDE 50 MG/ML
50 INJECTION INTRAMUSCULAR; INTRAVENOUS ONCE
Status: CANCELLED | OUTPATIENT
Start: 2022-06-08 | End: 2022-06-08

## 2022-06-07 RX ORDER — SODIUM CHLORIDE 9 MG/ML
25 INJECTION, SOLUTION INTRAVENOUS PRN
Status: CANCELLED | OUTPATIENT
Start: 2022-06-08

## 2022-06-07 RX ORDER — SODIUM CHLORIDE 9 MG/ML
INJECTION, SOLUTION INTRAVENOUS CONTINUOUS
Status: CANCELLED | OUTPATIENT
Start: 2022-06-08

## 2022-06-07 RX ORDER — METHYLPREDNISOLONE SODIUM SUCCINATE 125 MG/2ML
125 INJECTION, POWDER, LYOPHILIZED, FOR SOLUTION INTRAMUSCULAR; INTRAVENOUS ONCE
Status: CANCELLED | OUTPATIENT
Start: 2022-06-08 | End: 2022-06-08

## 2022-06-07 RX ORDER — EPINEPHRINE 1 MG/ML
0.3 INJECTION, SOLUTION, CONCENTRATE INTRAVENOUS PRN
Status: CANCELLED | OUTPATIENT
Start: 2022-06-08

## 2022-06-07 RX ORDER — SODIUM CHLORIDE 9 MG/ML
20 INJECTION, SOLUTION INTRAVENOUS ONCE
Status: CANCELLED | OUTPATIENT
Start: 2022-06-08 | End: 2022-06-08

## 2022-06-07 RX ORDER — SODIUM CHLORIDE 0.9 % (FLUSH) 0.9 %
5-40 SYRINGE (ML) INJECTION PRN
Status: CANCELLED | OUTPATIENT
Start: 2022-06-08

## 2022-06-07 RX ORDER — HEPARIN SODIUM (PORCINE) LOCK FLUSH IV SOLN 100 UNIT/ML 100 UNIT/ML
500 SOLUTION INTRAVENOUS PRN
Status: CANCELLED | OUTPATIENT
Start: 2022-06-08

## 2022-06-08 ENCOUNTER — HOSPITAL ENCOUNTER (OUTPATIENT)
Dept: INFUSION THERAPY | Age: 72
Discharge: HOME | End: 2022-06-08
Payer: MEDICARE

## 2022-06-08 VITALS
DIASTOLIC BLOOD PRESSURE: 60 MMHG | BODY MASS INDEX: 31.5 KG/M2 | HEART RATE: 59 BPM | OXYGEN SATURATION: 98 % | WEIGHT: 196 LBS | TEMPERATURE: 96.6 F | HEIGHT: 66 IN | SYSTOLIC BLOOD PRESSURE: 129 MMHG

## 2022-06-08 DIAGNOSIS — C90.00 MULTIPLE MYELOMA NOT HAVING ACHIEVED REMISSION (HCC): Primary | ICD-10-CM

## 2022-06-08 LAB
ALBUMIN SERPL-MCNC: 4 GM/DL (ref 3.4–5)
ALP BLD-CCNC: 91 IU/L (ref 40–129)
ALT SERPL-CCNC: 14 U/L (ref 10–40)
ANION GAP SERPL CALCULATED.3IONS-SCNC: 13 MMOL/L (ref 4–16)
AST SERPL-CCNC: 15 IU/L (ref 15–37)
BASOPHILS ABSOLUTE: 0 K/CU MM
BASOPHILS RELATIVE PERCENT: 0.2 % (ref 0–1)
BILIRUB SERPL-MCNC: 0.3 MG/DL (ref 0–1)
BUN BLDV-MCNC: 22 MG/DL (ref 6–23)
CALCIUM SERPL-MCNC: 9.2 MG/DL (ref 8.3–10.6)
CHLORIDE BLD-SCNC: 104 MMOL/L (ref 99–110)
CO2: 23 MMOL/L (ref 21–32)
CREAT SERPL-MCNC: 1 MG/DL (ref 0.6–1.1)
DIFFERENTIAL TYPE: ABNORMAL
EOSINOPHILS ABSOLUTE: 0 K/CU MM
EOSINOPHILS RELATIVE PERCENT: 0.2 % (ref 0–3)
ERYTHROCYTE SEDIMENTATION RATE: 20 MM/HR (ref 0–30)
GFR AFRICAN AMERICAN: >60 ML/MIN/1.73M2
GFR AFRICAN AMERICAN: >60 ML/MIN/1.73M2
GFR NON-AFRICAN AMERICAN: 53 ML/MIN/1.73M2
GFR NON-AFRICAN AMERICAN: 55 ML/MIN/1.73M2
GLUCOSE BLD-MCNC: 116 MG/DL (ref 70–99)
GLUCOSE BLD-MCNC: 122 MG/DL (ref 70–99)
HCT VFR BLD CALC: 34.2 % (ref 37–47)
HEMOGLOBIN: 10.7 GM/DL (ref 12.5–16)
IGA: 213 MG/DL (ref 69–382)
IGG,SERUM: 777 MG/DL (ref 723–1685)
IGM,SERUM: 26 MG/DL (ref 62–277)
LACTATE DEHYDROGENASE: 210 IU/L (ref 120–246)
LYMPHOCYTES ABSOLUTE: 0.4 K/CU MM
LYMPHOCYTES RELATIVE PERCENT: 8 % (ref 24–44)
MCH RBC QN AUTO: 28.6 PG (ref 27–31)
MCHC RBC AUTO-ENTMCNC: 31.3 % (ref 32–36)
MCV RBC AUTO: 91.4 FL (ref 78–100)
MONOCYTES ABSOLUTE: 0.1 K/CU MM
MONOCYTES RELATIVE PERCENT: 1.1 % (ref 0–4)
PDW BLD-RTO: 18.4 % (ref 11.7–14.9)
PLATELET # BLD: 250 K/CU MM (ref 140–440)
PMV BLD AUTO: 11 FL (ref 7.5–11.1)
POC BUN: 19 MG/DL (ref 8–26)
POC CALCIUM: 1.21 MMOL/L (ref 1.12–1.32)
POC CHLORIDE: 108 MMOL/L (ref 98–109)
POC CO2: 25 MMOL/L (ref 21–32)
POC CREATININE: 1 MG/DL (ref 0.6–1.1)
POTASSIUM SERPL-SCNC: 4.1 MMOL/L (ref 3.5–4.5)
POTASSIUM SERPL-SCNC: 4.3 MMOL/L (ref 3.5–5.1)
RBC # BLD: 3.74 M/CU MM (ref 4.2–5.4)
SEGMENTED NEUTROPHILS ABSOLUTE COUNT: 4.8 K/CU MM
SEGMENTED NEUTROPHILS RELATIVE PERCENT: 90.5 % (ref 36–66)
SODIUM BLD-SCNC: 140 MMOL/L (ref 135–145)
SODIUM BLD-SCNC: 143 MMOL/L (ref 138–146)
SOURCE, BLOOD GAS: ABNORMAL
TOTAL PROTEIN: 6.4 GM/DL (ref 6.4–8.2)
WBC # BLD: 5.4 K/CU MM (ref 4–10.5)

## 2022-06-08 PROCEDURE — 85652 RBC SED RATE AUTOMATED: CPT

## 2022-06-08 PROCEDURE — 80053 COMPREHEN METABOLIC PANEL: CPT

## 2022-06-08 PROCEDURE — 86320 SERUM IMMUNOELECTROPHORESIS: CPT

## 2022-06-08 PROCEDURE — 82232 ASSAY OF BETA-2 PROTEIN: CPT

## 2022-06-08 PROCEDURE — 96365 THER/PROPH/DIAG IV INF INIT: CPT

## 2022-06-08 PROCEDURE — 85025 COMPLETE CBC W/AUTO DIFF WBC: CPT

## 2022-06-08 PROCEDURE — 84165 PROTEIN E-PHORESIS SERUM: CPT

## 2022-06-08 PROCEDURE — 2580000003 HC RX 258: Performed by: INTERNAL MEDICINE

## 2022-06-08 PROCEDURE — 83883 ASSAY NEPHELOMETRY NOT SPEC: CPT

## 2022-06-08 PROCEDURE — 84155 ASSAY OF PROTEIN SERUM: CPT

## 2022-06-08 PROCEDURE — 86334 IMMUNOFIX E-PHORESIS SERUM: CPT

## 2022-06-08 PROCEDURE — 82784 ASSAY IGA/IGD/IGG/IGM EACH: CPT

## 2022-06-08 PROCEDURE — 83615 LACTATE (LD) (LDH) ENZYME: CPT

## 2022-06-08 PROCEDURE — 6360000002 HC RX W HCPCS: Performed by: INTERNAL MEDICINE

## 2022-06-08 RX ORDER — DOXYCYCLINE HYCLATE 100 MG/1
100 CAPSULE ORAL 2 TIMES DAILY
COMMUNITY

## 2022-06-08 RX ORDER — SODIUM CHLORIDE 9 MG/ML
20 INJECTION, SOLUTION INTRAVENOUS ONCE
Status: DISCONTINUED | OUTPATIENT
Start: 2022-06-08 | End: 2022-06-09 | Stop reason: HOSPADM

## 2022-06-08 RX ADMIN — SODIUM CHLORIDE 20 ML/HR: 9 INJECTION, SOLUTION INTRAVENOUS at 11:58

## 2022-06-08 RX ADMIN — ZOLEDRONIC ACID 3.5 MG: 4 INJECTION, SOLUTION, CONCENTRATE INTRAVENOUS at 11:59

## 2022-06-08 NOTE — PROGRESS NOTES
Ambulated to infusion area, here today for zometa infusion. No concerns at this time. PIV placed in left AC, positive blood return noted. Labs drawn. Labs within defined limits. Chemo administered as ordered. Call light within reach. Tolerated infusion without incident. Discharge instructions provided.  Patient RTC 06/22 for OV with Dr. Lino Storey.

## 2022-06-09 LAB
KAPPA QUANT FREE LIGHT CHAINS: 23.59 MG/L (ref 3.3–19.4)
KAPPA/LAMBDA FREE LIGHT CHAIN RATIO: 1.07 (ref 0.26–1.65)
LAMBDA FREE LIGHT CHAINS QNT: 22.02 MG/L (ref 5.71–26.3)

## 2022-06-10 LAB — BETA-2 MICROGLOBULIN: 3 MG/L

## 2022-06-12 LAB
ALBUMIN SERPL-MCNC: 3.3 G/DL (ref 3.75–5.01)
ALPHA-1-GLOBULIN: 0.47 G/DL (ref 0.19–0.46)
ALPHA-2-GLOBULIN: 0.96 G/DL (ref 0.48–1.05)
BETA GLOBULIN: 0.89 G/DL (ref 0.48–1.1)
EER IMMUNOFIX ELECTROPHORESIS GEL: NORMAL
GAMMA: 0.78 G/DL (ref 0.62–1.51)
IMMUNOFIX ELECTROPHORESIS GEL: NORMAL
IMMUNOFIXATION REFLEX: ABNORMAL
PROTEIN ELECTROPHORESIS, SERUM: ABNORMAL
SPE/IFE INTERPRETATION: ABNORMAL
TOTAL PROTEIN: 6.4 G/DL (ref 6.3–8.2)

## 2022-06-17 RX ORDER — DEXAMETHASONE 4 MG/1
TABLET ORAL
Qty: 30 TABLET | Refills: 0 | Status: SHIPPED | OUTPATIENT
Start: 2022-06-17 | End: 2022-09-19

## 2022-06-22 ENCOUNTER — OFFICE VISIT (OUTPATIENT)
Dept: ONCOLOGY | Age: 72
End: 2022-06-22
Payer: MEDICARE

## 2022-06-22 ENCOUNTER — HOSPITAL ENCOUNTER (OUTPATIENT)
Dept: INFUSION THERAPY | Age: 72
Discharge: HOME | End: 2022-06-22

## 2022-06-22 VITALS
SYSTOLIC BLOOD PRESSURE: 131 MMHG | HEIGHT: 66 IN | WEIGHT: 201.6 LBS | TEMPERATURE: 97.4 F | RESPIRATION RATE: 18 BRPM | BODY MASS INDEX: 32.4 KG/M2 | DIASTOLIC BLOOD PRESSURE: 59 MMHG | HEART RATE: 55 BPM | OXYGEN SATURATION: 96 %

## 2022-06-22 DIAGNOSIS — C90.00 MULTIPLE MYELOMA NOT HAVING ACHIEVED REMISSION (HCC): Primary | ICD-10-CM

## 2022-06-22 DIAGNOSIS — C90.00 MULTIPLE MYELOMA NOT HAVING ACHIEVED REMISSION (HCC): ICD-10-CM

## 2022-06-22 PROCEDURE — 99214 OFFICE O/P EST MOD 30 MIN: CPT | Performed by: INTERNAL MEDICINE

## 2022-06-22 PROCEDURE — G8417 CALC BMI ABV UP PARAM F/U: HCPCS | Performed by: INTERNAL MEDICINE

## 2022-06-22 PROCEDURE — G8400 PT W/DXA NO RESULTS DOC: HCPCS | Performed by: INTERNAL MEDICINE

## 2022-06-22 PROCEDURE — 1036F TOBACCO NON-USER: CPT | Performed by: INTERNAL MEDICINE

## 2022-06-22 PROCEDURE — G8427 DOCREV CUR MEDS BY ELIG CLIN: HCPCS | Performed by: INTERNAL MEDICINE

## 2022-06-22 PROCEDURE — 1123F ACP DISCUSS/DSCN MKR DOCD: CPT | Performed by: INTERNAL MEDICINE

## 2022-06-22 PROCEDURE — 3017F COLORECTAL CA SCREEN DOC REV: CPT | Performed by: INTERNAL MEDICINE

## 2022-06-22 PROCEDURE — 1090F PRES/ABSN URINE INCON ASSESS: CPT | Performed by: INTERNAL MEDICINE

## 2022-06-22 RX ORDER — PREDNISOLONE ACETATE 10 MG/ML
SUSPENSION/ DROPS OPHTHALMIC
COMMUNITY
Start: 2022-06-01

## 2022-06-22 RX ORDER — KETOROLAC TROMETHAMINE 5 MG/ML
SOLUTION OPHTHALMIC
COMMUNITY
Start: 2022-06-01

## 2022-06-22 RX ORDER — OFLOXACIN 3 MG/ML
SOLUTION/ DROPS OPHTHALMIC
COMMUNITY
Start: 2022-06-01

## 2022-06-22 ASSESSMENT — PATIENT HEALTH QUESTIONNAIRE - PHQ9
SUM OF ALL RESPONSES TO PHQ9 QUESTIONS 1 & 2: 0
SUM OF ALL RESPONSES TO PHQ QUESTIONS 1-9: 0
SUM OF ALL RESPONSES TO PHQ QUESTIONS 1-9: 0
2. FEELING DOWN, DEPRESSED OR HOPELESS: 0
SUM OF ALL RESPONSES TO PHQ QUESTIONS 1-9: 0
SUM OF ALL RESPONSES TO PHQ QUESTIONS 1-9: 0
1. LITTLE INTEREST OR PLEASURE IN DOING THINGS: 0

## 2022-06-22 NOTE — PROGRESS NOTES
Patient Name: Tahmina Vallejo  Patient : 1950  Patient MRN: [id number]     Primary Oncologist: Reagan Cowan MD  Referring Provider: Kan Hobbs MD     Date of Service: 2022      Chief Complaint:   Chief Complaint   Patient presents with    Follow-up     Patient Active Problem List:     Multiple myeloma not having achieved remission     HPI:   Ms. Kimi Lundy is a 57-year-old very pleasant patient with a medical history significant for stage II cervical cancer diagnosed in , status post laser surgery, gastroesophageal reflux disease, initially referred to me on 2014 for evaluation of right tibial intramedullary lesion. She stated that she has been having right leg pain since 2014, which has gradually been getting worse over time. She was seen by her primary care physician and had x-ray on 2014. It showed abnormal lucency within the tibial shaft at the junction of the mid and proximal third. MRI of the right lower extremity was done on 2014, and it showed marrow replacing 5.8 cm intramedullary lesion in the right mid tibial shaft with cortical breakthrough and adjacent periostitis. This corresponds to the lytic/lucent lesion on the recent x-ray. Differential considerations include lytic metastatic disease or multiple myeloma. She was subsequently referred to me for evaluation. She had upper sternum tumor for the last four years, which is about 8 by 10 cm in diameter. She otherwise does not have any other significant symptoms. 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 It is too small to measure the quantity. Her ESR was mildly elevated to 49.       Laboratory results done on September 3, 2014, showed normal CBC, mild elevation in serum creatinine (1.3), normal calcium (9.8), normal total protein (7.4), beta-2 microglobulin (4.2), triclonal gammopathy (2 IgA Lambda and 1 free Lambda light chain) on serum protein electrophoresis, two of which are large enough to measure on serum protein electrophoresis and which together total 300 mg/dl, ESR (60). Quantitative immunoglobulin IgA was elevated (760), IgM (41 mg/dl), and IgG (926). She also has 3.4 grams of protein in 23 hour urine and urine immunoelectrophoresis is consistent with monoclonal free Lambda light chains. She had biopsy of the right tibial lesions and sternal lesions on August 29, 2014 and final pathology was consistent with plasma cell myeloma. Flow cytometry was consistent with 23 percent of CD56 positive clonal plasma cell population, consistent with plasma cell neoplasms. FISH study showed \"Abnormal Myeloma FISH Profile. Monosomy for chromosome 13. Aneuploid population: Gain of chromosomes 15 and FGFR3/4p\". Cytogenetics wasn't performed by laboratory. Complete bone survey was done on September 3, 2014, and it showed multiple lucent lesions involving the visualized bony skeleton concerning for metastatic disease. This involves the skull, T10 vertebral body, left humerus, and right femur. Faint opacity overlying the left upper lobe could be related to atelectasis, mass, and/or infiltrate. Followup chest radiograph is recommended to assure stability/resolution. The lungs are under aerated, which limits evaluation. I believe faint opacity in the left upper lobe is most likely due to upper sternal lesion. Chemotherapy with Velcade, Revlimid and dexamethasone was started on September 16, 2014. Palliative radiation therapy to the right tibia lesion was also started by Dr. Katerina Forbes on September 29, 2014 and she completed it on October 10, 2014.     Her serum free Lambda light chain on November 4, 2014, after the second cycle of chemotherapy was 1.25 (normal), after the first cycle of chemotherapy it was 4.32 (October 1, 2014), and decreased from pretreatment value of 141 on September 3, 2014. Her 24-hour urine protein has decreased to 90 mg per 24 hour on November 25, 2014, after third cycle of chemotherapy. It has decreased from 3.4 grams before chemotherapy. Ms. Marline Anand completed her eight cycle of chemotherapy with Velcade, Revlimid and dexamethasone on February 27, 2015. Maintenance chemotherapy with Velcade was started on March 17, 2015. Since Ms. Marline Anand developed significant neuropathy from maintenance chemotherapy with Velcade, we stopped Velcade on July 19, 2016. Since she has minimal residual disease and she is not considering stem-cell transplantation, I recommend to start melphalan and dexamethasone as a maintenance chemotherapy. It was started since August 11, 2016 and we stopped it on November 18, 2016, since melphalan is not available as of November 16, 2016. Since we could not get melphalan, we changed her chemotherapy to Revlimid and dexamethasone. She has been on Revlimid until January 18, 2017. I stopped the Revlimid since she could not tolerate Revlimid and dexamethasone very well. She also developed right lower extremity deep vein thrombosis secondary to the Revlimid. Anticoagulation therapy with Eliquis was started since January 18, 2017. We resumed her back on melphalan and prednisone since January 18, 2017. Eliquis was stopped on May 17, 2017, since she has been on Eliquis for about four months duration. Her D-dimer level done on May 17, 2017, was also within the normal range. Ms. Marline Anand was found to have leukopenia, anemia, and thrombocytopenia on a blood test done on May 17, 2017, and I believe it is due to chemotherapy. Her chemotherapy was held since May 17, 2017. Since she is found to have recurrent multiple myeloma, we started chemotherapy with Velcade, Revlimid and dexamethasone since July 7, 2021.   Zoledronic acid was started on July 7, 2021 and we will continue to give it every 12 week intervals. We stopped velcade after 9/8/2021 therapy since she could not tolerate it well. We decided to continue with revlimid and dexamethasone only since then. On June 22, 2022, she presented to me for followup. I have been following Ms. Bar for multiple myeloma. She was on maintenance chemotherapy until May 7, 2017, and we stopped it after that since she could not tolerate maintenance chemotherapy because of significant myelosuppression. She was placed on under close observation. She noticed increase in size of her upper sternum lesion. It was decreased and back to normal size after chemo in 2016. I recognize that her serum lambda light chain has gradually increased over time. In view of her enlarging upper sternum lesion along with increasing lambda light chain, I believe she has recurrent multiple myeloma. I offered her to have repeat scans and bone marrow biopsy to confirm progression of the disease. However, she does not want to have repeat scans because it exacerbates her vitiligo. She also does not want to have biopsy at this moment. However, she agrees to start chemotherapy with Velcade, Revlimid and dexamethasone. Since her laboratory work-ups and clinical findings are consistent with recurrent multiple myeloma, we started first-line chemotherapy on July 7, 2021. Since she has been in remission for more than 4 years, we decided to rechallenge with velcade, revlimid and dexamethason. We stopped velcade after 9/8/2021 therapy since she could not tolerate it well. We decided to continue with revlimid and dexamethasone only since then. I recognize that her upper sternal mass is decreasing in size. I recognize that her serum lambda light chain was normal on 10/20/21, 11/17/21, 1/26/22 and 6/8/22 blood test. Her serum creatinine was 1 mg/dl on 6/8/22. I recommend her to continue with revlimid for now.  I will years and has two children. She is currently working at [employer]. ALLERGIES:  She claimed to have allergies to aspirin and iron. Review of Systems: \"Per interval history; otherwise 10 point ROS is negative. \"   Her energy level is stable and her sleep is fine. She denies fever, chills, night sweats, cough, shortness of breath, chest pain, hemoptysis or palpitations. Her bowel and bladder functions are normal. She doesn't have nausea, vomiting, abdominal pain, diarrhea, constipation, dysuria, loss of appetite or weight loss. She denies neuropathy and she doesn't have bleeding or clotting issues. She denies any pain in her body. Denies anxiety or depression. The rest of the systems are unremarkable. Vital Signs:  BP (!) 131/59 (Site: Right Lower Arm, Position: Sitting, Cuff Size: Large Adult)   Pulse 55   Temp 97.4 °F (36.3 °C) (Temporal)   Resp 18   Ht 5' 6\" (1.676 m)   Wt 201 lb 9.6 oz (91.4 kg)   SpO2 96%   BMI 32.54 kg/m²     Physical Exam:  CONSTITUTIONAL: awake, alert, cooperative, no apparent distress   EYES: pupils equal, round and reactive to light, sclera clear and conjunctiva normal  ENT: Normocephalic, without obvious abnormality, atraumatic  NECK: supple, symmetrical, no jugular venous distension and no carotid bruits   HEMATOLOGIC/LYMPHATIC: no cervical, supraclavicular or axillary lymphadenopathy   LUNGS: VBS, no wheezes, no increased work of breathing, no crackles, clear to auscultation, no rhonchi,     CARDIOVASCULAR: regular rate and rhythm, normal S1 and S2, no murmur noted  ABDOMEN: normal bowel sounds x 4, non-tender, no masses palpated, no hepatosplenomegaly,  soft, non-distended,   MUSCULOSKELETAL: full range of motion noted, tone is normal  NEUROLOGIC: awake, alert, oriented to name, place and time. Motor skills grossly intact.   SKIN: Normal skin color, texture, turgor and no jaundice.  appears intact   EXTREMITIES: no clubbing, no LE edema, no cyanosis, no leg swelling, Labs:  Hematology:  Lab Results   Component Value Date    WBC 5.4 06/08/2022    RBC 3.74 (L) 06/08/2022    HGB 10.7 (L) 06/08/2022    HCT 34.2 (L) 06/08/2022    MCV 91.4 06/08/2022    MCH 28.6 06/08/2022    MCHC 31.3 (L) 06/08/2022    RDW 18.4 (H) 06/08/2022     06/08/2022    MPV 11.0 06/08/2022    BANDSPCT 5 03/08/2017    SEGSPCT 90.5 (H) 06/08/2022    EOSRELPCT 0.2 06/08/2022    BASOPCT 0.2 06/08/2022    LYMPHOPCT 8.0 (L) 06/08/2022    MONOPCT 1.1 06/08/2022    BANDABS 0.12 03/08/2017    SEGSABS 4.8 06/08/2022    EOSABS 0.0 06/08/2022    BASOSABS 0.0 06/08/2022    LYMPHSABS 0.4 06/08/2022    MONOSABS 0.1 06/08/2022    DIFFTYPE AUTOMATED DIFFERENTIAL 06/08/2022     Lab Results   Component Value Date    ESR 20 06/08/2022     Chemistry:  Lab Results   Component Value Date     06/08/2022    K 4.1 06/08/2022     06/08/2022    CO2 23 06/08/2022    BUN 22 06/08/2022    CREATININE 1.0 06/08/2022    GLUCOSE 122 (H) 06/08/2022    CALCIUM 9.2 06/08/2022    PROT 6.4 06/08/2022    PROT 6.4 06/08/2022    LABALBU 3.30 (L) 06/08/2022    LABALBU 4.0 06/08/2022    BILITOT 0.3 06/08/2022    ALKPHOS 91 06/08/2022    AST 15 06/08/2022    ALT 14 06/08/2022    LABGLOM 53 (L) 06/08/2022    GFRAA >60 06/08/2022    POCCA 1.21 06/08/2022    POCGLU 116 (H) 06/08/2022     Lab Results   Component Value Date     06/08/2022     No components found for: LD  Lab Results   Component Value Date    TSHHS 2.340 10/31/2018     Immunology:  Lab Results   Component Value Date    PROT 6.4 06/08/2022    PROT 6.4 06/08/2022    SPEP INTERPRETATION - No monoclonal bands are seen. SAF 04/06/2022    SPEP INTERPRETATION - Within normal limits.  SAF 04/06/2022    ALBUMINELP 3.1 (L) 04/06/2022    LABALPH 0.47 (H) 06/08/2022    LABALPH 0.96 06/08/2022    LABBETA 0.89 06/08/2022    GAMGLOB 0.8 04/06/2022     Lab Results   Component Value Date    KAPPAUVOL 23.59 (H) 06/08/2022    LAMBDAUVOL 67.21 (H) 11/11/2020    KLFLCR 1.07 06/08/2022 Lab Results   Component Value Date    B2M 3.0 06/08/2022     Coagulation Panel:  Lab Results   Component Value Date    PROTIME 12.8 (H) 10/31/2018    INR 1.12 10/31/2018    APTT 29.7 10/31/2018    DDIMER <200 05/17/2017     Anemia Panel:  Lab Results   Component Value Date    LCSAMACZ50 228.6 05/17/2017    FOLATE >20.0 (H) 05/17/2017     Tumor Markers:  No results found for: , CEA, , LABCA2, PSA  Observations:  PHQ-9 Total Score: 0 (6/22/2022 10:49 AM)      Assessment & Plan:   Right tibial intramedullary lesion and upper sternum lesion - with multiple lytic bone lesions - biopsy is consistent with multiple myeloma - s/p chemotherapy with Velcade, Revlimid and Dexamentasone - currently on maintenance Melphalan and prednisone. PLAN:  Ms. Christina Krishna has been followed for multiple myeloma. Since she is found to have recurrent multiple myeloma, we started chemotherapy with Velcade, Revlimid and dexamethasone since July 7, 2021. Zoledronic acid was started on July 7, 2021 and we will continue to give it every 12 week intervals. On June 22, 2022, she presented to me for followup. I have been following Ms. Bar for multiple myeloma. She was on maintenance chemotherapy until May 7, 2017, and we stopped it after that since she could not tolerate maintenance chemotherapy because of significant myelosuppression. She was placed on under close observation. She noticed increase in size of her upper sternum lesion. It was decreased and back to normal size after chemo in 2016. I recognize that her serum lambda light chain has gradually increased over time. In view of her enlarging upper sternum lesion along with increasing lambda light chain, I believe she has recurrent multiple myeloma. I offered her to have repeat scans and bone marrow biopsy to confirm progression of the disease. However, she does not want to have repeat scans because it exacerbates her vitiligo.   She also does not want to have biopsy at this moment. However, she agrees to start chemotherapy with Velcade, Revlimid and dexamethasone. Since her laboratory work-ups and clinical findings are consistent with recurrent multiple myeloma, we started first-line chemotherapy on July 7, 2021. Since she has been in remission for more than 4 years, we decided to rechallenge with velcade, revlimid and dexamethason. We stopped velcade after 9/8/2021 therapy since she could not tolerate it well. We decided to continue with revlimid and dexamethasone only since then. I recognize that her upper sternal mass is decreasing in size. I recognize that her serum lambda light chain was normal on 10/20/21, 11/17/21, 1/26/22 and 6/8/22 blood test. Her serum creatinine was 1 mg/dl on 6/8/22. I recommend her to continue with revlimid for now. I will change it to Revlimid 10 mg continuously starting from now. She is going to have cataract surgery on 6/30/22 and I recommend to start revlimid 1 week after surgery. I will repeat multiple myeloma work-ups again in 3 months and I will follow-up with the results. If she has progression of the disease in the future, I will consider to increase the dose of Revlimid. Revlimid induced diarrhea - not well controlled by imodium. Will start lomotil 1 tablet Q6 prn today. Will monitor her symptom. Edema of lower legs - recommend to continue with Lasix 20 mg daily as needed basis. I will continue to monitor her edema closely. Revlimid induced thrombosis prophylaxis - I recommend her to continue with aspirin 81 mg daily. Herpes zoster prophylaxis - I recommend her to continue with acyclovir 400 mg twice daily. I also recommend to continue with zoledronic acid every twelve month intervals. For her hypertension, I recommend to continue with metoprolol 50 mg daily for now.   Her blood pressure reading on today is normal. I also recommend her to continue with vitamin B12 supplementation. Health maintenance - I asked her to have age-appropriate cancer screening, exercise, low-fat and low-sodium diet. I answered all her questions and concerns for today. I asked her to follow up with primary care physician on regular basis. Recent imaging and labs were reviewed and discussed with the patient.

## 2022-06-22 NOTE — PROGRESS NOTES
MA Rooming Questions  Patient: Parker Marqeuz  MRN: [id number]    Date: 6/22/2022        1. Do you have any new issues? Pt having cataracts removed next week, also wanting Sched for infusion for next few months. 2. Do you need any refills on medications?    no    3. Have you had any imaging done since your last visit?   no    4. Have you been hospitalized or seen in the emergency room since your last visit here?   no    5. Did the patient have a depression screening completed today?  Yes    PHQ-9 Total Score: 0 (6/22/2022 10:49 AM)       PHQ-9 Given to (if applicable):               PHQ-9 Score (if applicable):                     [] Positive     []  Negative              Does question #9 need addressed (if applicable)                     [] Yes    []  No               Valerie Werner, CMA

## 2022-06-23 DIAGNOSIS — C90.00 MULTIPLE MYELOMA NOT HAVING ACHIEVED REMISSION (HCC): ICD-10-CM

## 2022-06-23 RX ORDER — LENALIDOMIDE 10 MG/1
CAPSULE ORAL
Qty: 21 CAPSULE | Refills: 0 | Status: SHIPPED | OUTPATIENT
Start: 2022-06-23 | End: 2022-06-23 | Stop reason: SDUPTHER

## 2022-06-23 RX ORDER — LENALIDOMIDE 10 MG/1
CAPSULE ORAL
Qty: 28 CAPSULE | Refills: 0 | Status: ACTIVE | OUTPATIENT
Start: 2022-06-23 | End: 2022-07-21 | Stop reason: SDUPTHER

## 2022-06-23 NOTE — PROGRESS NOTES
From: Jamia Lawrence  To: Rolo Santana MD  Sent: 1/10/2018 2:57 PM CST  Subject: Message from the 29th of Dec and cervical spine MRI results    Just touching base wondering if you hav the results from my most recent MRI and that you received my message from the 29th of Dec. Thank you Jamia Lawrence   Revlimid 10 chemo capsules reordered and sent to [facility]. Patient to take oral chemo daily for maintenance instead of 21 day on, 7 days off per physician instruction. QTY changed from 21 to 28 days per physician order. Juancarlos Song # [id number] obtained through [employer] risk management/[employer] portal. Auth valid for 30 days.

## 2022-06-23 NOTE — PROGRESS NOTES
57 [address] Update    Date: 06/23/22    Medication is currently being filled at [facility] and has been routed there. Please call us with any questions at [phone] opt.  2.

## 2022-07-21 DIAGNOSIS — C90.00 MULTIPLE MYELOMA NOT HAVING ACHIEVED REMISSION (HCC): ICD-10-CM

## 2022-07-21 RX ORDER — LENALIDOMIDE 10 MG/1
CAPSULE ORAL
Qty: 28 CAPSULE | Refills: 0 | Status: ACTIVE | OUTPATIENT
Start: 2022-07-21 | End: 2022-08-17 | Stop reason: SDUPTHER

## 2022-07-21 NOTE — PROGRESS NOTES
Revlimid 10 mg chemo capsules reordered and sent to [facility] [address]. [facility] # [id number] obtained through [employer] risk management/REMS portal. Auth valid for 30 days.

## 2022-07-21 NOTE — PROGRESS NOTES
[facility]    Date: 07/21/22    Medication is currently being filled at [address] [facility] and has been routed there. Please call us with any questions at [phone] opt.  2.

## 2022-08-17 DIAGNOSIS — C90.00 MULTIPLE MYELOMA NOT HAVING ACHIEVED REMISSION (HCC): ICD-10-CM

## 2022-08-17 RX ORDER — LENALIDOMIDE 10 MG/1
CAPSULE ORAL
Qty: 28 CAPSULE | Refills: 0 | Status: ACTIVE | OUTPATIENT
Start: 2022-08-17 | End: 2022-09-20 | Stop reason: SDUPTHER

## 2022-08-29 DIAGNOSIS — C90.00 MULTIPLE MYELOMA NOT HAVING ACHIEVED REMISSION (HCC): Primary | ICD-10-CM

## 2022-08-29 RX ORDER — SODIUM CHLORIDE 9 MG/ML
25 INJECTION, SOLUTION INTRAVENOUS PRN
Status: CANCELLED | OUTPATIENT
Start: 2022-08-31

## 2022-08-29 RX ORDER — DIPHENHYDRAMINE HYDROCHLORIDE 50 MG/ML
50 INJECTION INTRAMUSCULAR; INTRAVENOUS ONCE
Status: CANCELLED | OUTPATIENT
Start: 2022-08-31 | End: 2022-08-31

## 2022-08-29 RX ORDER — SODIUM CHLORIDE 9 MG/ML
INJECTION, SOLUTION INTRAVENOUS CONTINUOUS
Status: CANCELLED | OUTPATIENT
Start: 2022-08-31

## 2022-08-29 RX ORDER — SODIUM CHLORIDE 9 MG/ML
20 INJECTION, SOLUTION INTRAVENOUS ONCE
Status: CANCELLED | OUTPATIENT
Start: 2022-08-31 | End: 2022-08-31

## 2022-08-29 RX ORDER — HEPARIN SODIUM (PORCINE) LOCK FLUSH IV SOLN 100 UNIT/ML 100 UNIT/ML
500 SOLUTION INTRAVENOUS PRN
Status: CANCELLED | OUTPATIENT
Start: 2022-08-31

## 2022-08-29 RX ORDER — METHYLPREDNISOLONE SODIUM SUCCINATE 125 MG/2ML
125 INJECTION, POWDER, LYOPHILIZED, FOR SOLUTION INTRAMUSCULAR; INTRAVENOUS ONCE
Status: CANCELLED | OUTPATIENT
Start: 2022-08-31 | End: 2022-08-31

## 2022-08-29 RX ORDER — SODIUM CHLORIDE 0.9 % (FLUSH) 0.9 %
5-40 SYRINGE (ML) INJECTION PRN
Status: CANCELLED | OUTPATIENT
Start: 2022-08-31

## 2022-08-29 RX ORDER — EPINEPHRINE 1 MG/ML
0.3 INJECTION, SOLUTION, CONCENTRATE INTRAVENOUS PRN
Status: CANCELLED | OUTPATIENT
Start: 2022-08-31

## 2022-08-31 ENCOUNTER — HOSPITAL ENCOUNTER (OUTPATIENT)
Dept: INFUSION THERAPY | Age: 72
Discharge: HOME | End: 2022-08-31
Payer: MEDICARE

## 2022-08-31 VITALS
HEIGHT: 66 IN | OXYGEN SATURATION: 97 % | HEART RATE: 58 BPM | WEIGHT: 201.4 LBS | BODY MASS INDEX: 32.37 KG/M2 | SYSTOLIC BLOOD PRESSURE: 145 MMHG | DIASTOLIC BLOOD PRESSURE: 64 MMHG | TEMPERATURE: 97.6 F

## 2022-08-31 DIAGNOSIS — C90.00 MULTIPLE MYELOMA NOT HAVING ACHIEVED REMISSION (HCC): Primary | ICD-10-CM

## 2022-08-31 DIAGNOSIS — C90.00 MULTIPLE MYELOMA NOT HAVING ACHIEVED REMISSION (HCC): ICD-10-CM

## 2022-08-31 DIAGNOSIS — G89.3 NEOPLASM RELATED PAIN (ACUTE) (CHRONIC): Primary | ICD-10-CM

## 2022-08-31 LAB
ALBUMIN SERPL-MCNC: 3.6 GM/DL (ref 3.4–5)
ALP BLD-CCNC: 94 IU/L (ref 40–128)
ALT SERPL-CCNC: 18 U/L (ref 10–40)
ANION GAP SERPL CALCULATED.3IONS-SCNC: 9 MMOL/L (ref 4–16)
AST SERPL-CCNC: 20 IU/L (ref 15–37)
BASOPHILS ABSOLUTE: 0 K/CU MM
BASOPHILS RELATIVE PERCENT: 0.6 % (ref 0–1)
BILIRUB SERPL-MCNC: 0.4 MG/DL (ref 0–1)
BUN BLDV-MCNC: 19 MG/DL (ref 6–23)
CALCIUM SERPL-MCNC: 8.7 MG/DL (ref 8.3–10.6)
CHLORIDE BLD-SCNC: 103 MMOL/L (ref 99–110)
CO2: 25 MMOL/L (ref 21–32)
CREAT SERPL-MCNC: 0.9 MG/DL (ref 0.6–1.1)
DIFFERENTIAL TYPE: ABNORMAL
EOSINOPHILS ABSOLUTE: 0 K/CU MM
EOSINOPHILS RELATIVE PERCENT: 0.6 % (ref 0–3)
GFR AFRICAN AMERICAN: >60 ML/MIN/1.73M2
GFR AFRICAN AMERICAN: >60 ML/MIN/1.73M2
GFR NON-AFRICAN AMERICAN: >60 ML/MIN/1.73M2
GFR NON-AFRICAN AMERICAN: >60 ML/MIN/1.73M2
GLUCOSE BLD-MCNC: 119 MG/DL (ref 70–99)
GLUCOSE BLD-MCNC: 123 MG/DL (ref 70–99)
HCT VFR BLD CALC: 31.3 % (ref 37–47)
HEMOGLOBIN: 9.8 GM/DL (ref 12.5–16)
LYMPHOCYTES ABSOLUTE: 0.5 K/CU MM
LYMPHOCYTES RELATIVE PERCENT: 15.1 % (ref 24–44)
MCH RBC QN AUTO: 29.1 PG (ref 27–31)
MCHC RBC AUTO-ENTMCNC: 31.3 % (ref 32–36)
MCV RBC AUTO: 92.9 FL (ref 78–100)
MONOCYTES ABSOLUTE: 0.1 K/CU MM
MONOCYTES RELATIVE PERCENT: 2.3 % (ref 0–4)
PDW BLD-RTO: 17.7 % (ref 11.7–14.9)
PLATELET # BLD: 185 K/CU MM (ref 140–440)
PMV BLD AUTO: 10.8 FL (ref 7.5–11.1)
POC BUN: 20 MG/DL (ref 8–26)
POC CALCIUM: 1.19 MMOL/L (ref 1.12–1.32)
POC CHLORIDE: 107 MMOL/L (ref 98–109)
POC CO2: 25 MMOL/L (ref 21–32)
POC CREATININE: 0.8 MG/DL (ref 0.6–1.1)
POTASSIUM SERPL-SCNC: 4.3 MMOL/L (ref 3.5–4.5)
POTASSIUM SERPL-SCNC: 4.9 MMOL/L (ref 3.5–5.1)
RBC # BLD: 3.37 M/CU MM (ref 4.2–5.4)
SEGMENTED NEUTROPHILS ABSOLUTE COUNT: 2.9 K/CU MM
SEGMENTED NEUTROPHILS RELATIVE PERCENT: 81.4 % (ref 36–66)
SODIUM BLD-SCNC: 137 MMOL/L (ref 135–145)
SODIUM BLD-SCNC: 142 MMOL/L (ref 138–146)
SOURCE, BLOOD GAS: ABNORMAL
TOTAL PROTEIN: 5.9 GM/DL (ref 6.4–8.2)
WBC # BLD: 3.5 K/CU MM (ref 4–10.5)

## 2022-08-31 PROCEDURE — 80053 COMPREHEN METABOLIC PANEL: CPT

## 2022-08-31 PROCEDURE — 2580000003 HC RX 258: Performed by: INTERNAL MEDICINE

## 2022-08-31 PROCEDURE — 6360000002 HC RX W HCPCS: Performed by: INTERNAL MEDICINE

## 2022-08-31 PROCEDURE — 96365 THER/PROPH/DIAG IV INF INIT: CPT

## 2022-08-31 PROCEDURE — 85025 COMPLETE CBC W/AUTO DIFF WBC: CPT

## 2022-08-31 RX ORDER — SODIUM CHLORIDE 9 MG/ML
20 INJECTION, SOLUTION INTRAVENOUS ONCE
Status: COMPLETED | OUTPATIENT
Start: 2022-08-31 | End: 2022-08-31

## 2022-08-31 RX ADMIN — ZOLEDRONIC ACID 4 MG: 4 INJECTION, SOLUTION, CONCENTRATE INTRAVENOUS at 11:46

## 2022-08-31 RX ADMIN — SODIUM CHLORIDE 20 ML/HR: 9 INJECTION, SOLUTION INTRAVENOUS at 11:45

## 2022-09-19 RX ORDER — DEXAMETHASONE 4 MG/1
TABLET ORAL
Qty: 30 TABLET | Refills: 0 | Status: SHIPPED | OUTPATIENT
Start: 2022-09-19

## 2022-09-20 ENCOUNTER — CLINICAL DOCUMENTATION (OUTPATIENT)
Dept: ONCOLOGY | Age: 72
End: 2022-09-20

## 2022-09-20 DIAGNOSIS — C90.00 MULTIPLE MYELOMA NOT HAVING ACHIEVED REMISSION (HCC): ICD-10-CM

## 2022-09-20 RX ORDER — LENALIDOMIDE 10 MG/1
CAPSULE ORAL
Qty: 28 CAPSULE | Refills: 0 | Status: ACTIVE | OUTPATIENT
Start: 2022-09-20 | End: 2022-10-14 | Stop reason: SDUPTHER

## 2022-09-20 NOTE — PROGRESS NOTES
Revlimid 10 mg chemo capsules #28 reordered and e-scribed to [facility] [address]. Rocky Hale # [id number] obtained through [employer] risk management/REMS portal. Auth valid for 30 days.

## 2022-09-20 NOTE — PROGRESS NOTES
Attempted to call patient @ [phone] to notify that revlimid has been reordered; however, no answer. VM left. This RN direct number provided.

## 2022-09-28 ENCOUNTER — HOSPITAL ENCOUNTER (OUTPATIENT)
Dept: INFUSION THERAPY | Age: 72
Discharge: HOME | End: 2022-09-28
Payer: MEDICARE

## 2022-09-28 DIAGNOSIS — C90.00 MULTIPLE MYELOMA NOT HAVING ACHIEVED REMISSION (HCC): ICD-10-CM

## 2022-09-28 LAB
ALBUMIN SERPL-MCNC: 3.7 GM/DL (ref 3.4–5)
ALP BLD-CCNC: 78 IU/L (ref 40–129)
ALT SERPL-CCNC: 15 U/L (ref 10–40)
ANION GAP SERPL CALCULATED.3IONS-SCNC: 9 MMOL/L (ref 4–16)
AST SERPL-CCNC: 15 IU/L (ref 15–37)
BASOPHILS ABSOLUTE: 0.1 K/CU MM
BASOPHILS RELATIVE PERCENT: 2.2 % (ref 0–1)
BILIRUB SERPL-MCNC: 0.5 MG/DL (ref 0–1)
BUN BLDV-MCNC: 16 MG/DL (ref 6–23)
CALCIUM SERPL-MCNC: 8.8 MG/DL (ref 8.3–10.6)
CHLORIDE BLD-SCNC: 109 MMOL/L (ref 99–110)
CO2: 24 MMOL/L (ref 21–32)
CREAT SERPL-MCNC: 0.8 MG/DL (ref 0.6–1.1)
DIFFERENTIAL TYPE: ABNORMAL
EOSINOPHILS ABSOLUTE: 0 K/CU MM
EOSINOPHILS RELATIVE PERCENT: 0.4 % (ref 0–3)
ERYTHROCYTE SEDIMENTATION RATE: 15 MM/HR (ref 0–30)
GFR AFRICAN AMERICAN: >60 ML/MIN/1.73M2
GFR NON-AFRICAN AMERICAN: >60 ML/MIN/1.73M2
GLUCOSE BLD-MCNC: 101 MG/DL (ref 70–99)
HCT VFR BLD CALC: 30.3 % (ref 37–47)
HEMOGLOBIN: 9.2 GM/DL (ref 12.5–16)
IGA: 170 MG/DL (ref 69–382)
IGG,SERUM: 699 MG/DL (ref 723–1685)
IGM,SERUM: <25 MG/DL (ref 62–277)
LACTATE DEHYDROGENASE: 226 IU/L (ref 120–246)
LYMPHOCYTES ABSOLUTE: 0.5 K/CU MM
LYMPHOCYTES RELATIVE PERCENT: 17.2 % (ref 24–44)
MCH RBC QN AUTO: 29.2 PG (ref 27–31)
MCHC RBC AUTO-ENTMCNC: 30.4 % (ref 32–36)
MCV RBC AUTO: 96.2 FL (ref 78–100)
MONOCYTES ABSOLUTE: 0.1 K/CU MM
MONOCYTES RELATIVE PERCENT: 4.1 % (ref 0–4)
PDW BLD-RTO: 18.3 % (ref 11.7–14.9)
PLATELET # BLD: 154 K/CU MM (ref 140–440)
PMV BLD AUTO: 10.8 FL (ref 7.5–11.1)
POTASSIUM SERPL-SCNC: 4.1 MMOL/L (ref 3.5–5.1)
RBC # BLD: 3.15 M/CU MM (ref 4.2–5.4)
SEGMENTED NEUTROPHILS ABSOLUTE COUNT: 2 K/CU MM
SEGMENTED NEUTROPHILS RELATIVE PERCENT: 76.1 % (ref 36–66)
SODIUM BLD-SCNC: 142 MMOL/L (ref 135–145)
TOTAL PROTEIN: 5.7 GM/DL (ref 6.4–8.2)
WBC # BLD: 2.7 K/CU MM (ref 4–10.5)

## 2022-09-28 PROCEDURE — 80053 COMPREHEN METABOLIC PANEL: CPT

## 2022-09-28 PROCEDURE — 85025 COMPLETE CBC W/AUTO DIFF WBC: CPT

## 2022-09-28 PROCEDURE — 84155 ASSAY OF PROTEIN SERUM: CPT

## 2022-09-28 PROCEDURE — 83615 LACTATE (LD) (LDH) ENZYME: CPT

## 2022-09-28 PROCEDURE — 85652 RBC SED RATE AUTOMATED: CPT

## 2022-09-28 PROCEDURE — 84165 PROTEIN E-PHORESIS SERUM: CPT

## 2022-09-28 PROCEDURE — 36415 COLL VENOUS BLD VENIPUNCTURE: CPT

## 2022-09-28 PROCEDURE — 83883 ASSAY NEPHELOMETRY NOT SPEC: CPT

## 2022-09-28 PROCEDURE — 82232 ASSAY OF BETA-2 PROTEIN: CPT

## 2022-09-28 PROCEDURE — 86320 SERUM IMMUNOELECTROPHORESIS: CPT

## 2022-09-28 PROCEDURE — 82784 ASSAY IGA/IGD/IGG/IGM EACH: CPT

## 2022-09-29 LAB — BETA-2 MICROGLOBULIN: 3 MG/L

## 2022-09-30 LAB
ALBUMIN ELP: 3 GM/DL (ref 3.2–5.6)
ALPHA-1-GLOBULIN: 0.3 GM/DL (ref 0.1–0.4)
ALPHA-2-GLOBULIN: 0.7 GM/DL (ref 0.4–1.2)
BETA GLOBULIN: 1 GM/DL (ref 0.5–1.3)
GAMMA GLOBULIN: 0.6 GM/DL (ref 0.5–1.6)
KAPPA QUANT FREE LIGHT CHAINS: 18.8 MG/L (ref 3.3–19.4)
KAPPA/LAMBDA FREE LIGHT CHAIN RATIO: 0.83 (ref 0.26–1.65)
LAMBDA FREE LIGHT CHAINS QNT: 22.63 MG/L (ref 5.71–26.3)
SPEP INTERPRETATION: ABNORMAL
TOTAL PROTEIN: 5.7 GM/DL (ref 6.4–8.2)

## 2022-10-03 LAB — SPEP INTERPRETATION: NORMAL

## 2022-10-03 NOTE — PROGRESS NOTES
Patient Name: Yolanda Sepulveda  Patient : 1950  Patient MRN: [id number]     Primary Oncologist: Jessica Samuels MD  Referring Provider: Ольга Altamirano MD     Date of Service: 10/5/2022      Chief Complaint:   Chief Complaint   Patient presents with    Discuss Labs     Patient Active Problem List:     Multiple myeloma not having achieved remission     HPI:   Ms. Belinda Vallecillo is a 45-year-old very pleasant patient with a medical history significant for stage II cervical cancer diagnosed in , status post laser surgery, gastroesophageal reflux disease, initially referred to me on 2014 for evaluation of right tibial intramedullary lesion. She stated that she has been having right leg pain since 2014, which has gradually been getting worse over time. She was seen by her primary care physician and had x-ray on 2014. It showed abnormal lucency within the tibial shaft at the junction of the mid and proximal third. MRI of the right lower extremity was done on 2014, and it showed marrow replacing 5.8 cm intramedullary lesion in the right mid tibial shaft with cortical breakthrough and adjacent periostitis. This corresponds to the lytic/lucent lesion on the recent x-ray. Differential considerations include lytic metastatic disease or multiple myeloma. She was subsequently referred to me for evaluation. She had upper sternum tumor for the last four years, which is about 8 by 10 cm in diameter. She otherwise does not have any other significant symptoms. 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 It is too small to measure the quantity. Her ESR was mildly elevated to 49.       Laboratory results done on September 3, 2014, showed normal CBC, mild elevation in serum creatinine (1.3), normal calcium (9.8), normal total protein (7.4), beta-2 microglobulin (4.2), triclonal gammopathy (2 IgA Lambda and 1 free Lambda light chain) on serum protein electrophoresis, two of which are large enough to measure on serum protein electrophoresis and which together total 300 mg/dl, ESR (60). Quantitative immunoglobulin IgA was elevated (760), IgM (41 mg/dl), and IgG (926). She also has 3.4 grams of protein in 23 hour urine and urine immunoelectrophoresis is consistent with monoclonal free Lambda light chains. She had biopsy of the right tibial lesions and sternal lesions on August 29, 2014 and final pathology was consistent with plasma cell myeloma. Flow cytometry was consistent with 23 percent of CD56 positive clonal plasma cell population, consistent with plasma cell neoplasms. FISH study showed \"Abnormal Myeloma FISH Profile. Monosomy for chromosome 13. Aneuploid population: Gain of chromosomes 15 and FGFR3/4p\". Cytogenetics wasn't performed by laboratory. Complete bone survey was done on September 3, 2014, and it showed multiple lucent lesions involving the visualized bony skeleton concerning for metastatic disease. This involves the skull, T10 vertebral body, left humerus, and right femur. Faint opacity overlying the left upper lobe could be related to atelectasis, mass, and/or infiltrate. Followup chest radiograph is recommended to assure stability/resolution. The lungs are under aerated, which limits evaluation. I believe faint opacity in the left upper lobe is most likely due to upper sternal lesion. Chemotherapy with Velcade, Revlimid and dexamethasone was started on September 16, 2014. Palliative radiation therapy to the right tibia lesion was also started by Dr. Yanelis Garcia on September 29, 2014 and she completed it on October 10, 2014.     Her serum free Lambda light chain on November 4, 2014, after the second cycle of chemotherapy was 1.25 (normal), after the first cycle of was started on July 7, 2021 and we will continue to give it every 12 week intervals. We stopped velcade after 9/8/2021 therapy since she could not tolerate it well. We decided to continue with revlimid and dexamethasone only since then. On October 5, 2022, she presented to me for followup. I have been following Ms. Bar for multiple myeloma. She was on maintenance chemotherapy until May 7, 2017, and we stopped it after that since she could not tolerate maintenance chemotherapy because of significant myelosuppression. She was placed on under close observation. She noticed increase in size of her upper sternum lesion. It was decreased and back to normal size after chemo in 2016. I recognize that her serum lambda light chain has gradually increased over time. In view of her enlarging upper sternum lesion along with increasing lambda light chain, I believe she has recurrent multiple myeloma. I offered her to have repeat scans and bone marrow biopsy to confirm progression of the disease. However, she does not want to have repeat scans because it exacerbates her vitiligo. She also does not want to have biopsy at this moment. However, she agrees to start chemotherapy with Velcade, Revlimid and dexamethasone. Since her laboratory work-ups and clinical findings are consistent with recurrent multiple myeloma, we started first-line chemotherapy on July 7, 2021. Since she has been in remission for more than 4 years, we decided to rechallenge with velcade, revlimid and dexamethason. We stopped velcade after 9/8/2021 therapy since she could not tolerate it well. We decided to continue with revlimid and dexamethasone only since then. I recognize that her upper sternal mass is decreasing in size. I recognize that her serum lambda light chain was normal on 9/28/22 blood test. Her serum creatinine was 0.8 mg/dl on 9/28/22. I recommend her to continue with revlimid for now.  She is on Revlimid 10 mg continuously. Since she has significant edema, I will stop dexamethasone starting from today. I will repeat multiple myeloma work-ups again in 3 months and I will follow-up with the results. If she has progression of the disease in the future, I will consider to increase the dose of Revlimid. Revlimid induced diarrhea - not well controlled by imodium. Will start lomotil 1 tablet Q6 prn today. Will monitor her symptom. Edema of lower legs - recommend to continue with Lasix 20 mg daily as needed basis. I will continue to monitor her edema closely. Revlimid induced thrombosis prophylaxis - I recommend her to continue with aspirin 81 mg daily. Herpes zoster prophylaxis - I recommend her to continue with acyclovir 400 mg twice daily. I also recommend to continue with zoledronic acid every twelve month intervals. For her hypertension, I recommend to continue with metoprolol 50 mg daily for now. Her blood pressure reading on today is normal. I also recommend her to continue with vitamin B12 supplementation. Health maintenance - I asked her to have age-appropriate cancer screening, exercise, low-fat and low-sodium diet. I will continue to monitor her blood pressure closely. PAST MEDICAL HISTORY:  Past medical history is significant for the following. 1. Gastroesophageal reflux disease. 2. Stage II cervical cancer diagnosed in 1980, status post laser surgery. PAST SURGICAL HISTORY:  Past surgical history is significant for the following. 1. Cholecystectomy in 1998.  2. Tonsillectomy and appendectomy. 3. Tubal ligation in 1981. 4. Cheloid removal in October 2002. FAMILY HISTORY:  Family history is significant for small cell lung cancer in her mother. No other family history of cancer disease. SOCIAL HISTORY:  She denies smoking, alcohol drinking, and illicit drug abuse. She is  for 11 years and has two children.  She is currently working at [employer]. ALLERGIES:  She claimed to have allergies to aspirin and iron. Review of Systems: \"Per interval history; otherwise 10 point ROS is negative. \"   Her energy level is low and her sleep is fine. She denies fever, chills, night sweats, cough, shortness of breath, chest pain, hemoptysis or palpitations. Her bowel and bladder functions are normal. She doesn't have nausea, vomiting, abdominal pain, diarrhea, constipation, dysuria, loss of appetite or weight loss. She denies neuropathy and she doesn't have bleeding or clotting issues. She denies any pain in her body. No anxiety or depression. The rest of the systems are unremarkable. Vital Signs:  BP (!) 154/68 (Site: Right Wrist, Position: Sitting, Cuff Size: Large Adult)   Pulse 52   Temp 97 °F (36.1 °C) (Infrared)   Resp 16   Ht 5' 6\" (1.676 m)   Wt 209 lb 9.6 oz (95.1 kg)   SpO2 97%   BMI 33.83 kg/m²     Physical Exam:  CONSTITUTIONAL: awake, alert, cooperative, no apparent distress   EYES: pupils equal, round and reactive to light, sclera clear and conjunctiva normal  ENT: Normocephalic, without obvious abnormality, atraumatic  NECK: supple, symmetrical, no jugular venous distension and no carotid bruits   HEMATOLOGIC/LYMPHATIC: no cervical, supraclavicular or axillary lymphadenopathy   LUNGS: VBS, no wheezes, no increased work of breathing, no crackles, clear to auscultation, no rhonchi,     CARDIOVASCULAR: regular rate and rhythm, normal S1 and S2, no murmur noted  ABDOMEN: normal bowel sounds x 4, non-tender, no masses palpated, no hepatosplenomegaly,  soft, non-distended,   MUSCULOSKELETAL: full range of motion noted, tone is normal  NEUROLOGIC: awake, alert, oriented to name, place and time. Motor skills grossly intact. SKIN: Normal skin color, texture, turgor and no jaundice.  appears intact   EXTREMITIES: no LE edema, no cyanosis, no clubbing, no leg swelling,      Labs:  Hematology:  Lab Results   Component Value Date    WBC 2.7 (L) 09/28/2022    RBC 3.15 (L) 09/28/2022    HGB 9.2 (L) 09/28/2022    HCT 30.3 (L) 09/28/2022    MCV 96.2 09/28/2022    MCH 29.2 09/28/2022    MCHC 30.4 (L) 09/28/2022    RDW 18.3 (H) 09/28/2022     09/28/2022    MPV 10.8 09/28/2022    BANDSPCT 5 03/08/2017    SEGSPCT 76.1 (H) 09/28/2022    EOSRELPCT 0.4 09/28/2022    BASOPCT 2.2 (H) 09/28/2022    LYMPHOPCT 17.2 (L) 09/28/2022    MONOPCT 4.1 (H) 09/28/2022    BANDABS 0.12 03/08/2017    SEGSABS 2.0 09/28/2022    EOSABS 0.0 09/28/2022    BASOSABS 0.1 09/28/2022    LYMPHSABS 0.5 09/28/2022    MONOSABS 0.1 09/28/2022    DIFFTYPE AUTOMATED DIFFERENTIAL 09/28/2022     Lab Results   Component Value Date    ESR 15 09/28/2022     Chemistry:  Lab Results   Component Value Date     09/28/2022    K 4.1 09/28/2022     09/28/2022    CO2 24 09/28/2022    BUN 16 09/28/2022    CREATININE 0.8 09/28/2022    GLUCOSE 101 (H) 09/28/2022    CALCIUM 8.8 09/28/2022    PROT 5.7 (L) 09/28/2022    PROT 5.7 (L) 09/28/2022    LABALBU 3.7 09/28/2022    BILITOT 0.5 09/28/2022    ALKPHOS 78 09/28/2022    AST 15 09/28/2022    ALT 15 09/28/2022    LABGLOM >60 09/28/2022    GFRAA >60 09/28/2022    POCCA 1.19 08/31/2022    POCGLU 123 (H) 08/31/2022     Lab Results   Component Value Date     09/28/2022     No components found for: LD  Lab Results   Component Value Date    TSHHS 2.340 10/31/2018     Immunology:  Lab Results   Component Value Date    PROT 5.7 (L) 09/28/2022    PROT 5.7 (L) 09/28/2022    SPEP  09/28/2022     INTERPRETATION - Decreased albumin and total protein. SAF    SPEP  09/28/2022     INTERPRETATION - Definitive monoclonal proteins are not identified.   LP.    ALBUMINELP 3.0 (L) 09/28/2022    LABALPH 0.3 09/28/2022    LABALPH 0.7 09/28/2022    LABBETA 1.0 09/28/2022    GAMGLOB 0.6 09/28/2022     Lab Results   Component Value Date    KAPPAUVOL 18.80 09/28/2022    LAMBDAUVOL 67.21 (H) 11/11/2020    KLFLCR 0.83 09/28/2022     Lab Results   Component Value Date    B2M 3.0 09/28/2022     Coagulation Panel:  Lab Results   Component Value Date    PROTIME 12.8 (H) 10/31/2018    INR 1.12 10/31/2018    APTT 29.7 10/31/2018    DDIMER <200 05/17/2017     Anemia Panel:  Lab Results   Component Value Date    GAVSSSTZ17 228.6 05/17/2017    FOLATE >20.0 (H) 05/17/2017     Tumor Markers:  No results found for: , CEA, , LABCA2, PSA  Observations:  PHQ-9 Total Score: 0 (10/5/2022  9:37 AM)      Assessment & Plan:   Right tibial intramedullary lesion and upper sternum lesion - with multiple lytic bone lesions - biopsy is consistent with multiple myeloma - s/p chemotherapy with Velcade, Revlimid and Dexamentasone - currently on maintenance Melphalan and prednisone. PLAN:  Ms. Wild Kingston has been followed for multiple myeloma. Since she is found to have recurrent multiple myeloma, we started chemotherapy with Velcade, Revlimid and dexamethasone since July 7, 2021. Zoledronic acid was started on July 7, 2021 and we will continue to give it every 12 week intervals. On October 5, 2022, she presented to me for followup. I have been following Ms. Bar for multiple myeloma. She was on maintenance chemotherapy until May 7, 2017, and we stopped it after that since she could not tolerate maintenance chemotherapy because of significant myelosuppression. She was placed on under close observation. She noticed increase in size of her upper sternum lesion. It was decreased and back to normal size after chemo in 2016. I recognize that her serum lambda light chain has gradually increased over time. In view of her enlarging upper sternum lesion along with increasing lambda light chain, I believe she has recurrent multiple myeloma. I offered her to have repeat scans and bone marrow biopsy to confirm progression of the disease. However, she does not want to have repeat scans because it exacerbates her vitiligo. She also does not want to have biopsy at this moment.     However, she agrees to start chemotherapy with Velcade, Revlimid and dexamethasone. Since her laboratory work-ups and clinical findings are consistent with recurrent multiple myeloma, we started first-line chemotherapy on July 7, 2021. Since she has been in remission for more than 4 years, we decided to rechallenge with velcade, revlimid and dexamethason. We stopped velcade after 9/8/2021 therapy since she could not tolerate it well. We decided to continue with revlimid and dexamethasone only since then. I recognize that her upper sternal mass is decreasing in size. I recognize that her serum lambda light chain was normal on 9/28/22 blood test. Her serum creatinine was 0.8 mg/dl on 9/28/22. I recommend her to continue with revlimid for now. She is on Revlimid 10 mg continuously. Since she has significant edema, I will stop dexamethasone starting from today. I will repeat multiple myeloma work-ups again in 3 months and I will follow-up with the results. If she has progression of the disease in the future, I will consider to increase the dose of Revlimid. Revlimid induced diarrhea - not well controlled by imodium. Will start lomotil 1 tablet Q6 prn today. Will monitor her symptom. Edema of lower legs - recommend to continue with Lasix 20 mg daily as needed basis. I will continue to monitor her edema closely. Revlimid induced thrombosis prophylaxis - I recommend her to continue with aspirin 81 mg daily. Herpes zoster prophylaxis - I recommend her to continue with acyclovir 400 mg twice daily. I also recommend to continue with zoledronic acid every twelve month intervals. For her hypertension, I recommend to continue with metoprolol 50 mg daily for now. Her blood pressure reading on today is normal. I also recommend her to continue with vitamin B12 supplementation. Health maintenance - I asked her to have age-appropriate cancer screening, exercise, low-fat and low-sodium diet. I answered all her questions and concerns for today. Recent imaging and labs were reviewed and discussed with the patient.

## 2022-10-05 ENCOUNTER — OFFICE VISIT (OUTPATIENT)
Dept: ONCOLOGY | Age: 72
End: 2022-10-05
Payer: MEDICARE

## 2022-10-05 ENCOUNTER — HOSPITAL ENCOUNTER (OUTPATIENT)
Dept: INFUSION THERAPY | Age: 72
Discharge: HOME | End: 2022-10-05
Payer: MEDICARE

## 2022-10-05 VITALS
SYSTOLIC BLOOD PRESSURE: 154 MMHG | DIASTOLIC BLOOD PRESSURE: 68 MMHG | OXYGEN SATURATION: 97 % | TEMPERATURE: 97 F | HEART RATE: 52 BPM | WEIGHT: 209.6 LBS | BODY MASS INDEX: 33.68 KG/M2 | RESPIRATION RATE: 16 BRPM | HEIGHT: 66 IN

## 2022-10-05 DIAGNOSIS — C90.00 MULTIPLE MYELOMA NOT HAVING ACHIEVED REMISSION (HCC): Primary | ICD-10-CM

## 2022-10-05 PROCEDURE — G8417 CALC BMI ABV UP PARAM F/U: HCPCS | Performed by: INTERNAL MEDICINE

## 2022-10-05 PROCEDURE — 1036F TOBACCO NON-USER: CPT | Performed by: INTERNAL MEDICINE

## 2022-10-05 PROCEDURE — 1090F PRES/ABSN URINE INCON ASSESS: CPT | Performed by: INTERNAL MEDICINE

## 2022-10-05 PROCEDURE — G8400 PT W/DXA NO RESULTS DOC: HCPCS | Performed by: INTERNAL MEDICINE

## 2022-10-05 PROCEDURE — 99214 OFFICE O/P EST MOD 30 MIN: CPT | Performed by: INTERNAL MEDICINE

## 2022-10-05 PROCEDURE — G8427 DOCREV CUR MEDS BY ELIG CLIN: HCPCS | Performed by: INTERNAL MEDICINE

## 2022-10-05 PROCEDURE — 3017F COLORECTAL CA SCREEN DOC REV: CPT | Performed by: INTERNAL MEDICINE

## 2022-10-05 PROCEDURE — G8484 FLU IMMUNIZE NO ADMIN: HCPCS | Performed by: INTERNAL MEDICINE

## 2022-10-05 PROCEDURE — 99211 OFF/OP EST MAY X REQ PHY/QHP: CPT

## 2022-10-05 PROCEDURE — 1123F ACP DISCUSS/DSCN MKR DOCD: CPT | Performed by: INTERNAL MEDICINE

## 2022-10-05 RX ORDER — FUROSEMIDE 20 MG/1
20 TABLET ORAL DAILY
Qty: 60 TABLET | Refills: 3 | Status: SHIPPED | OUTPATIENT
Start: 2022-10-05

## 2022-10-05 ASSESSMENT — PATIENT HEALTH QUESTIONNAIRE - PHQ9
SUM OF ALL RESPONSES TO PHQ QUESTIONS 1-9: 0
SUM OF ALL RESPONSES TO PHQ9 QUESTIONS 1 & 2: 0
SUM OF ALL RESPONSES TO PHQ QUESTIONS 1-9: 0
SUM OF ALL RESPONSES TO PHQ QUESTIONS 1-9: 0
1. LITTLE INTEREST OR PLEASURE IN DOING THINGS: 0
2. FEELING DOWN, DEPRESSED OR HOPELESS: 0
SUM OF ALL RESPONSES TO PHQ QUESTIONS 1-9: 0

## 2022-10-05 NOTE — PROGRESS NOTES
MA Rooming Questions  Patient: Víctor Chung  MRN: [id number]    Date: 10/5/2022        1. Do you have any new issues? yes - Swelling in feet and legs         2. Do you need any refills on medications?    no    3. Have you had any imaging done since your last visit?   no    4. Have you been hospitalized or seen in the emergency room since your last visit here?   no    5. Did the patient have a depression screening completed today?  Yes    PHQ-9 Total Score: 0 (10/5/2022  9:37 AM)       PHQ-9 Given to (if applicable):               PHQ-9 Score (if applicable):                     [] Positive     []  Negative              Does question #9 need addressed (if applicable)                     [] Yes    []  No               Susan Sanchez CMA

## 2022-10-14 DIAGNOSIS — C90.00 MULTIPLE MYELOMA NOT HAVING ACHIEVED REMISSION (HCC): ICD-10-CM

## 2022-10-14 RX ORDER — LENALIDOMIDE 10 MG/1
CAPSULE ORAL
Qty: 28 CAPSULE | Refills: 0 | Status: ACTIVE | OUTPATIENT
Start: 2022-10-14

## 2022-10-14 NOTE — PROGRESS NOTES
[facility] [address] Update    Date: 10/14/22    Medication is currently being filled at [address] [facility] and has been routed there. Please call us with any questions at [phone] opt.  2.

## 2022-10-14 NOTE — PROGRESS NOTES
Revlimid 10 mg chemo capsules reordered and e-scribed to [facility] [address] [place] # [id number] obtained through [employer] risk management/REMS portal. Auth valid for 30 days.

## 2022-11-14 DIAGNOSIS — C90.00 MULTIPLE MYELOMA NOT HAVING ACHIEVED REMISSION (HCC): ICD-10-CM

## 2022-11-14 RX ORDER — LENALIDOMIDE 10 MG/1
CAPSULE ORAL
Qty: 28 CAPSULE | Refills: 0 | Status: ACTIVE | OUTPATIENT
Start: 2022-11-14

## 2022-11-14 NOTE — PROGRESS NOTES
Revlimid 10 chemo capsules reordered and sent to [facility] [address]. Elham Dunham # [id number] obtained through [employer] risk management/REMS portal. Auth valid for 30 days.

## 2022-11-14 NOTE — PROGRESS NOTES
[facility] Update    Date: 11/14/22    Medication is currently being filled at [address] [facility] and has been routed there. Please call us with any questions at [phone] opt.  2.

## 2022-11-16 DIAGNOSIS — C90.00 MULTIPLE MYELOMA NOT HAVING ACHIEVED REMISSION (HCC): Primary | ICD-10-CM

## 2022-11-21 RX ORDER — SODIUM CHLORIDE 9 MG/ML
INJECTION, SOLUTION INTRAVENOUS CONTINUOUS
Status: CANCELLED | OUTPATIENT
Start: 2022-11-23

## 2022-11-21 RX ORDER — HEPARIN SODIUM (PORCINE) LOCK FLUSH IV SOLN 100 UNIT/ML 100 UNIT/ML
500 SOLUTION INTRAVENOUS PRN
Status: CANCELLED | OUTPATIENT
Start: 2022-11-23

## 2022-11-21 RX ORDER — METHYLPREDNISOLONE SODIUM SUCCINATE 125 MG/2ML
125 INJECTION, POWDER, LYOPHILIZED, FOR SOLUTION INTRAMUSCULAR; INTRAVENOUS ONCE
Status: CANCELLED | OUTPATIENT
Start: 2022-11-23 | End: 2022-11-23

## 2022-11-21 RX ORDER — SODIUM CHLORIDE 9 MG/ML
20 INJECTION, SOLUTION INTRAVENOUS ONCE
Status: CANCELLED | OUTPATIENT
Start: 2022-11-23 | End: 2022-11-23

## 2022-11-21 RX ORDER — SODIUM CHLORIDE 0.9 % (FLUSH) 0.9 %
5-40 SYRINGE (ML) INJECTION PRN
Status: CANCELLED | OUTPATIENT
Start: 2022-11-23

## 2022-11-21 RX ORDER — WATER 1000 ML/1000ML
2.2 INJECTION, SOLUTION INTRAVENOUS ONCE
Status: CANCELLED | OUTPATIENT
Start: 2022-11-23 | End: 2022-11-23

## 2022-11-21 RX ORDER — DIPHENHYDRAMINE HYDROCHLORIDE 50 MG/ML
50 INJECTION INTRAMUSCULAR; INTRAVENOUS ONCE
Status: CANCELLED | OUTPATIENT
Start: 2022-11-23 | End: 2022-11-23

## 2022-11-21 RX ORDER — SODIUM CHLORIDE 9 MG/ML
25 INJECTION, SOLUTION INTRAVENOUS PRN
Status: CANCELLED | OUTPATIENT
Start: 2022-11-23

## 2022-11-21 RX ORDER — EPINEPHRINE 1 MG/ML
0.3 INJECTION, SOLUTION, CONCENTRATE INTRAVENOUS PRN
Status: CANCELLED | OUTPATIENT
Start: 2022-11-23

## 2022-11-23 ENCOUNTER — HOSPITAL ENCOUNTER (OUTPATIENT)
Dept: INFUSION THERAPY | Age: 72
Discharge: HOME | End: 2022-11-23
Payer: MEDICARE

## 2022-11-23 VITALS
HEIGHT: 66 IN | BODY MASS INDEX: 32.75 KG/M2 | OXYGEN SATURATION: 97 % | DIASTOLIC BLOOD PRESSURE: 68 MMHG | SYSTOLIC BLOOD PRESSURE: 159 MMHG | HEART RATE: 55 BPM | WEIGHT: 203.8 LBS | TEMPERATURE: 97.4 F

## 2022-11-23 DIAGNOSIS — C90.00 MULTIPLE MYELOMA NOT HAVING ACHIEVED REMISSION (HCC): Primary | ICD-10-CM

## 2022-11-23 LAB
ALBUMIN SERPL-MCNC: 3.5 GM/DL (ref 3.4–5)
ALP BLD-CCNC: 94 IU/L (ref 40–128)
ALT SERPL-CCNC: 13 U/L (ref 10–40)
ANION GAP SERPL CALCULATED.3IONS-SCNC: 8 MMOL/L (ref 4–16)
AST SERPL-CCNC: 16 IU/L (ref 15–37)
BILIRUB SERPL-MCNC: 0.4 MG/DL (ref 0–1)
BUN BLDV-MCNC: 15 MG/DL (ref 6–23)
CALCIUM SERPL-MCNC: 8.6 MG/DL (ref 8.3–10.6)
CHLORIDE BLD-SCNC: 103 MMOL/L (ref 99–110)
CO2: 27 MMOL/L (ref 21–32)
CREAT SERPL-MCNC: 0.8 MG/DL (ref 0.6–1.1)
EGFR, POC: >60 ML/MIN/1.73M2
GFR SERPL CREATININE-BSD FRML MDRD: >60 ML/MIN/1.73M2
GLUCOSE BLD-MCNC: 79 MG/DL (ref 70–99)
GLUCOSE BLD-MCNC: 80 MG/DL (ref 70–99)
POC BUN: 15 MG/DL (ref 8–26)
POC CALCIUM: 1.13 MMOL/L (ref 1.12–1.32)
POC CHLORIDE: 108 MMOL/L (ref 98–109)
POC CO2: 24 MMOL/L (ref 21–32)
POC CREATININE: 1 MG/DL (ref 0.6–1.1)
POTASSIUM SERPL-SCNC: 4.1 MMOL/L (ref 3.5–4.5)
POTASSIUM SERPL-SCNC: 4.3 MMOL/L (ref 3.5–5.1)
SODIUM BLD-SCNC: 138 MMOL/L (ref 135–145)
SODIUM BLD-SCNC: 141 MMOL/L (ref 138–146)
SOURCE, BLOOD GAS: NORMAL
TOTAL PROTEIN: 6.1 GM/DL (ref 6.4–8.2)

## 2022-11-23 PROCEDURE — 2580000003 HC RX 258: Performed by: INTERNAL MEDICINE

## 2022-11-23 PROCEDURE — 6360000002 HC RX W HCPCS: Performed by: INTERNAL MEDICINE

## 2022-11-23 PROCEDURE — 96365 THER/PROPH/DIAG IV INF INIT: CPT

## 2022-11-23 PROCEDURE — 80053 COMPREHEN METABOLIC PANEL: CPT

## 2022-11-23 RX ORDER — SODIUM CHLORIDE 9 MG/ML
20 INJECTION, SOLUTION INTRAVENOUS ONCE
Status: COMPLETED | OUTPATIENT
Start: 2022-11-23 | End: 2022-11-23

## 2022-11-23 RX ADMIN — SODIUM CHLORIDE 20 ML/HR: 9 INJECTION, SOLUTION INTRAVENOUS at 12:06

## 2022-11-23 RX ADMIN — ZOLEDRONIC ACID 4 MG: 4 INJECTION, SOLUTION, CONCENTRATE INTRAVENOUS at 12:06

## 2022-11-23 NOTE — PROGRESS NOTES
Pt ambulated into treatment area for Zometa infusion. Patient has no concerns at this time. PIV placed in left AC, positive blood return noted. Labs drawn and sent to lab for processing. Zometa administered as ordered. Call light within reach. Tolerated infusion without incident. Discharge instructions provided.

## 2022-12-07 RX ORDER — LANOLIN ALCOHOL/MO/W.PET/CERES
CREAM (GRAM) TOPICAL
Qty: 90 TABLET | Refills: 0 | Status: SHIPPED | OUTPATIENT
Start: 2022-12-07

## 2022-12-10 DIAGNOSIS — C90.00 MULTIPLE MYELOMA NOT HAVING ACHIEVED REMISSION (HCC): ICD-10-CM

## 2022-12-11 DIAGNOSIS — C90.00 MULTIPLE MYELOMA NOT HAVING ACHIEVED REMISSION (HCC): ICD-10-CM

## 2022-12-12 DIAGNOSIS — C90.00 MULTIPLE MYELOMA NOT HAVING ACHIEVED REMISSION (HCC): ICD-10-CM

## 2022-12-12 RX ORDER — DEXAMETHASONE 4 MG/1
TABLET ORAL
Qty: 30 TABLET | Refills: 0 | Status: SHIPPED | OUTPATIENT
Start: 2022-12-12

## 2022-12-12 RX ORDER — LENALIDOMIDE 10 MG/1
CAPSULE ORAL
Qty: 28 CAPSULE | Refills: 0 | Status: ACTIVE | OUTPATIENT
Start: 2022-12-12

## 2022-12-12 RX ORDER — METOPROLOL SUCCINATE 25 MG/1
TABLET, EXTENDED RELEASE ORAL
Qty: 90 TABLET | Refills: 0 | Status: SHIPPED | OUTPATIENT
Start: 2022-12-12

## 2022-12-12 RX ORDER — LENALIDOMIDE 10 MG/1
CAPSULE ORAL
Qty: 28 CAPSULE | Refills: 0 | OUTPATIENT
Start: 2022-12-12

## 2022-12-12 NOTE — PROGRESS NOTES
[facility] Update    Date: 12/12/22    Medication is currently being filled at [address] [facility] and has been routed there. Please call us with any questions at [phone] opt.  2.

## 2022-12-12 NOTE — PROGRESS NOTES
Revlimid 10 mg chemo capsules ( take 1 capsule by mouth daily QTY 28) reordered and sent to [place] [address]. Monica Alert # [id number] obtained through [employer] risk management/REMS portal. Auth valid for 30 days.

## 2023-01-03 NOTE — PROGRESS NOTES
Patient Name: Adia Hernández  Patient : 1950  Patient MRN: [id number]     Primary Oncologist: Hipolito Love MD  Referring Provider: Mily Cisneros MD     Date of Service: 2023      Chief Complaint:   Chief Complaint   Patient presents with    Follow-up     Patient Active Problem List:     Multiple myeloma not having achieved remission     HPI:   Ms. Luanne Meckel is a 72-year-old very pleasant patient with a medical history significant for stage II cervical cancer diagnosed in , status post laser surgery, gastroesophageal reflux disease, initially referred to me on 2014 for evaluation of right tibial intramedullary lesion. She stated that she has been having right leg pain since 2014, which has gradually been getting worse over time. She was seen by her primary care physician and had x-ray on 2014. It showed abnormal lucency within the tibial shaft at the junction of the mid and proximal third. MRI of the right lower extremity was done on 2014, and it showed marrow replacing 5.8 cm intramedullary lesion in the right mid tibial shaft with cortical breakthrough and adjacent periostitis. This corresponds to the lytic/lucent lesion on the recent x-ray. Differential considerations include lytic metastatic disease or multiple myeloma. She was subsequently referred to me for evaluation. She had upper sternum tumor for the last four years, which is about 8 by 10 cm in diameter. She otherwise does not have any other significant symptoms. 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 It is too small to measure the quantity. Her ESR was mildly elevated to 49.       Laboratory results done on September 3, 2014, showed normal CBC, mild elevation in serum creatinine (1.3), normal calcium (9.8), normal total protein (7.4), beta-2 microglobulin (4.2), triclonal gammopathy (2 IgA Lambda and 1 free Lambda light chain) on serum protein electrophoresis, two of which are large enough to measure on serum protein electrophoresis and which together total 300 mg/dl, ESR (60). Quantitative immunoglobulin IgA was elevated (760), IgM (41 mg/dl), and IgG (926). She also has 3.4 grams of protein in 23 hour urine and urine immunoelectrophoresis is consistent with monoclonal free Lambda light chains. She had biopsy of the right tibial lesions and sternal lesions on August 29, 2014 and final pathology was consistent with plasma cell myeloma. Flow cytometry was consistent with 23 percent of CD56 positive clonal plasma cell population, consistent with plasma cell neoplasms. FISH study showed \"Abnormal Myeloma FISH Profile. Monosomy for chromosome 13. Aneuploid population: Gain of chromosomes 15 and FGFR3/4p\". Cytogenetics wasn't performed by laboratory. Complete bone survey was done on September 3, 2014, and it showed multiple lucent lesions involving the visualized bony skeleton concerning for metastatic disease. This involves the skull, T10 vertebral body, left humerus, and right femur. Faint opacity overlying the left upper lobe could be related to atelectasis, mass, and/or infiltrate. Followup chest radiograph is recommended to assure stability/resolution. The lungs are under aerated, which limits evaluation. I believe faint opacity in the left upper lobe is most likely due to upper sternal lesion. Chemotherapy with Velcade, Revlimid and dexamethasone was started on September 16, 2014. Palliative radiation therapy to the right tibia lesion was also started by Dr. Wilma Mckeon on September 29, 2014 and she completed it on October 10, 2014.     Her serum free Lambda light chain on November 4, 2014, after the second cycle of chemotherapy was 1.25 (normal), after the first cycle of chemotherapy it was 4.32 (October 1, 2014), and decreased from pretreatment value of 141 on September 3, 2014. Her 24-hour urine protein has decreased to 90 mg per 24 hour on November 25, 2014, after third cycle of chemotherapy. It has decreased from 3.4 grams before chemotherapy. Ms. Marcia Seymour completed her eight cycle of chemotherapy with Velcade, Revlimid and dexamethasone on February 27, 2015. Maintenance chemotherapy with Velcade was started on March 17, 2015. Since Ms. Marcia Seymour developed significant neuropathy from maintenance chemotherapy with Velcade, we stopped Velcade on July 19, 2016. Since she has minimal residual disease and she is not considering stem-cell transplantation, I recommend to start melphalan and dexamethasone as a maintenance chemotherapy. It was started since August 11, 2016 and we stopped it on November 18, 2016, since melphalan is not available as of November 16, 2016. Since we could not get melphalan, we changed her chemotherapy to Revlimid and dexamethasone. She has been on Revlimid until January 18, 2017. I stopped the Revlimid since she could not tolerate Revlimid and dexamethasone very well. She also developed right lower extremity deep vein thrombosis secondary to the Revlimid. Anticoagulation therapy with Eliquis was started since January 18, 2017. We resumed her back on melphalan and prednisone since January 18, 2017. Eliquis was stopped on May 17, 2017, since she has been on Eliquis for about four months duration. Her D-dimer level done on May 17, 2017, was also within the normal range. Ms. Marcia Seymour was found to have leukopenia, anemia, and thrombocytopenia on a blood test done on May 17, 2017, and I believe it is due to chemotherapy. Her chemotherapy was held since May 17, 2017. Since she is found to have recurrent multiple myeloma, we started chemotherapy with Velcade, Revlimid and dexamethasone since July 7, 2021.   Zoledronic acid was started on July 7, 2021 and we will continue to give it every 12 week intervals. We stopped velcade after 9/8/2021 therapy since she could not tolerate it well. We decided to continue with revlimid and dexamethasone only since then. On January 11, 2023, she presented to me for followup. I have been following Ms. Bar for multiple myeloma. She was on maintenance chemotherapy until May 7, 2017, and we stopped it after that since she could not tolerate maintenance chemotherapy because of significant myelosuppression. She was placed on under close observation. She noticed increase in size of her upper sternum lesion. It was decreased and back to normal size after chemo in 2016. I recognize that her serum lambda light chain has gradually increased over time. In view of her enlarging upper sternum lesion along with increasing lambda light chain, I believe she has recurrent multiple myeloma. I offered her to have repeat scans and bone marrow biopsy to confirm progression of the disease. However, she does not want to have repeat scans because it exacerbates her vitiligo. She also does not want to have biopsy at this moment. However, she agrees to start chemotherapy with Velcade, Revlimid and dexamethasone. Since her laboratory work-ups and clinical findings are consistent with recurrent multiple myeloma, we started first-line chemotherapy on July 7, 2021. Since she has been in remission for more than 4 years, we decided to rechallenge with velcade, revlimid and dexamethason. We stopped velcade after 9/8/2021 therapy since she could not tolerate it well. We decided to continue with revlimid and dexamethasone only since then. I recognize that her upper sternal mass is decreasing in size. I recognize that her serum lambda light chain was normal on 1/4/23 blood test. Her serum creatinine was 0.9 mg/dl on 1/4/23. I recommend her to continue with revlimid for now.  She is on Revlimid 10 mg continuously. Since she has significant edema, I will stop dexamethasone starting from today. I will repeat multiple myeloma work-ups again in 3 months and I will follow-up with the results. If she has progression of the disease in the future, I will consider to increase the dose of Revlimid. Revlimid induced diarrhea - not well controlled by imodium. Will start lomotil 1 tablet Q6 prn today. Will monitor her symptom. Edema of lower legs - recommend to continue with Lasix 20 mg daily as needed basis. I will continue to monitor her edema closely. Myeloma bone disease - recommend to continue with zometa every 12 weeks for now. Revlimid induced thrombosis prophylaxis - I recommend her to continue with aspirin 81 mg daily. Herpes zoster prophylaxis - I recommend her to continue with acyclovir 400 mg twice daily. I also recommend to continue with zoledronic acid every twelve month intervals. For her hypertension, I recommend to continue with metoprolol 50 mg daily for now. Her blood pressure reading on today is normal. I also recommend her to continue with vitamin B12 supplementation. Health maintenance - I asked her to have age-appropriate cancer screening, exercise, low-fat and low-sodium diet. I will continue to monitor her blood pressure closely. PAST MEDICAL HISTORY:  Past medical history is significant for the following. 1. Gastroesophageal reflux disease. 2. Stage II cervical cancer diagnosed in 1980, status post laser surgery. PAST SURGICAL HISTORY:  Past surgical history is significant for the following. 1. Cholecystectomy in 1998.  2. Tonsillectomy and appendectomy. 3. Tubal ligation in 1981. 4. Cheloid removal in October 2002. FAMILY HISTORY:  Family history is significant for small cell lung cancer in her mother. No other family history of cancer disease. SOCIAL HISTORY:  She denies smoking, alcohol drinking, and illicit drug abuse.   She is  for 11 years and has two children. She is currently working at [email or web address]. ALLERGIES:  She claimed to have allergies to aspirin and iron. Review of Systems: \"Per interval history; otherwise 10 point ROS is negative. \"   Her energy level is pretty good today and her sleep is fine. She denies fever, chills, night sweats, cough, shortness of breath, chest pain, hemoptysis or palpitations. Her bowel and bladder functions are normal. She doesn't have nausea, vomiting, abdominal pain, diarrhea, constipation, dysuria, loss of appetite or weight loss. She doesn't have neuropathy and she denies bleeding or clotting issues. She denies any pain in her body. No anxiety or depression. The rest of the systems are unremarkable. Vital Signs:  BP (!) 134/57 (Site: Right Lower Arm, Position: Sitting, Cuff Size: Medium Adult)   Pulse 56   Temp 96.8 °F (36 °C) (Infrared)   Resp 18   Ht 5' 6\" (1.676 m)   Wt 188 lb (85.3 kg)   SpO2 99%   BMI 30.34 kg/m²     Physical Exam:  CONSTITUTIONAL: awake, alert, cooperative, no apparent distress   EYES: pupils equal, round and reactive to light, sclera clear and conjunctiva normal  ENT: Normocephalic, without obvious abnormality, atraumatic  NECK: supple, symmetrical, no jugular venous distension and no carotid bruits   HEMATOLOGIC/LYMPHATIC: no cervical, supraclavicular or axillary lymphadenopathy   LUNGS: VBS, no wheezes, no increased work of breathing, no crackles, clear to auscultation, no rhonchi,     CARDIOVASCULAR: regular rate and rhythm, normal S1 and S2, no murmur noted  ABDOMEN: normal bowel sounds x 4, non-tender, no masses palpated, no hepatosplenomegaly,  soft, non-distended,   MUSCULOSKELETAL: full range of motion noted, tone is normal  NEUROLOGIC: awake, alert, oriented to name, place and time. Motor skills grossly intact. SKIN: Normal skin color, texture, turgor and no jaundice.  appears intact   EXTREMITIES: no cyanosis, no clubbing, no LE edema, no leg swelling,      Labs:  Hematology:  Lab Results   Component Value Date    WBC 2.3 (L) 01/04/2023    RBC 3.27 (L) 01/04/2023    HGB 9.3 (L) 01/04/2023    HCT 30.0 (L) 01/04/2023    MCV 91.7 01/04/2023    MCH 28.4 01/04/2023    MCHC 31.0 (L) 01/04/2023    RDW 18.3 (H) 01/04/2023     01/04/2023    MPV 11.0 01/04/2023    BANDSPCT 5 03/08/2017    SEGSPCT 77.5 (H) 01/04/2023    EOSRELPCT 0.4 01/04/2023    BASOPCT 0.9 01/04/2023    LYMPHOPCT 18.2 (L) 01/04/2023    MONOPCT 3.0 01/04/2023    BANDABS 0.12 03/08/2017    SEGSABS 1.8 01/04/2023    EOSABS 0.0 01/04/2023    BASOSABS 0.0 01/04/2023    LYMPHSABS 0.4 01/04/2023    MONOSABS 0.1 01/04/2023    DIFFTYPE AUTOMATED DIFFERENTIAL 01/04/2023     Lab Results   Component Value Date    ESR 39 (H) 01/04/2023     Chemistry:  Lab Results   Component Value Date     01/04/2023    K 4.3 01/04/2023     01/04/2023    CO2 21 01/04/2023    BUN 18 01/04/2023    CREATININE 0.9 01/04/2023    GLUCOSE 121 (H) 01/04/2023    CALCIUM 8.2 (L) 01/04/2023    PROT 6.2 (L) 01/04/2023    PROT 6.2 (L) 01/04/2023    LABALBU 3.8 01/04/2023    BILITOT 0.4 01/04/2023    ALKPHOS 84 01/04/2023    AST 13 (L) 01/04/2023    ALT 13 01/04/2023    LABGLOM >60 01/04/2023    GFRAA >60 09/28/2022    POCCA 1.13 11/23/2022    POCGLU 79 11/23/2022     Lab Results   Component Value Date     01/04/2023     No components found for: LD  Lab Results   Component Value Date    TSHHS 2.340 10/31/2018     Immunology:  Lab Results   Component Value Date    PROT 6.2 (L) 01/04/2023    PROT 6.2 (L) 01/04/2023    SPEP  01/04/2023     INTERPRETATION - Nonspepcific pattern, borderline decreased albumin. No monoclonal gammopathy. RS    SPEP INTERPRETATION - Within normal limits.   RS 01/04/2023    ALBUMINELP 3.1 (L) 01/04/2023    LABALPH 0.3 01/04/2023    LABALPH 0.7 01/04/2023    LABBETA 1.1 01/04/2023    GAMGLOB 0.9 01/04/2023     Lab Results   Component Value Date    KAPPAUVOL 27.42 (H) 01/04/2023 LAMBDAUVOL 67.21 (H) 11/11/2020    KLFLCR 1.15 01/04/2023     Lab Results   Component Value Date    B2M 2.6 01/04/2023     Coagulation Panel:  Lab Results   Component Value Date    PROTIME 12.8 (H) 10/31/2018    INR 1.12 10/31/2018    APTT 29.7 10/31/2018    DDIMER <200 05/17/2017     Anemia Panel:  Lab Results   Component Value Date    QSCYUWBO91 228.6 05/17/2017    FOLATE >20.0 (H) 05/17/2017     Tumor Markers:  No results found for: , CEA, , LABCA2, PSA  Observations:  No data recorded      Assessment & Plan:   Right tibial intramedullary lesion and upper sternum lesion - with multiple lytic bone lesions - biopsy is consistent with multiple myeloma - s/p chemotherapy with Velcade, Revlimid and Dexamentasone - currently on maintenance Melphalan and prednisone.      PLAN:  Ms. Bar has been followed for multiple myeloma.     Since she is found to have recurrent multiple myeloma, we started chemotherapy with Velcade, Revlimid and dexamethasone since July 7, 2021.  Zoledronic acid was started on July 7, 2021 and we will continue to give it every 12 week intervals.     On January 11, 2023, she presented to me for followup. I have been following Ms. Bar for multiple myeloma.  She was on maintenance chemotherapy until May 7, 2017, and we stopped it after that since she could not tolerate maintenance chemotherapy because of significant myelosuppression.  She was placed on under close observation.      She noticed increase in size of her upper sternum lesion. It was decreased and back to normal size after chemo in 2016.    I recognize that her serum lambda light chain has gradually increased over time. In view of her enlarging upper sternum lesion along with increasing lambda light chain, I believe she has recurrent multiple myeloma.    I offered her to have repeat scans and bone marrow biopsy to confirm progression of the disease.  However, she does not want to have repeat scans because it exacerbates  her vitiligo. She also does not want to have biopsy at this moment. However, she agrees to start chemotherapy with Velcade, Revlimid and dexamethasone. Since her laboratory work-ups and clinical findings are consistent with recurrent multiple myeloma, we started first-line chemotherapy on July 7, 2021. Since she has been in remission for more than 4 years, we decided to rechallenge with velcade, revlimid and dexamethason. We stopped velcade after 9/8/2021 therapy since she could not tolerate it well. We decided to continue with revlimid and dexamethasone only since then. I recognize that her upper sternal mass is decreasing in size. I recognize that her serum lambda light chain was normal on 1/4/23 blood test. Her serum creatinine was 0.9 mg/dl on 1/4/23. I recommend her to continue with revlimid for now. She is on Revlimid 10 mg continuously. Since she has significant edema, I will stop dexamethasone starting from today. I will repeat multiple myeloma work-ups again in 3 months and I will follow-up with the results. If she has progression of the disease in the future, I will consider to increase the dose of Revlimid. Revlimid induced diarrhea - not well controlled by imodium. Will start lomotil 1 tablet Q6 prn today. Will monitor her symptom. Edema of lower legs - recommend to continue with Lasix 20 mg daily as needed basis. I will continue to monitor her edema closely. Myeloma bone disease - recommend to continue with zometa every 12 weeks for now. Revlimid induced thrombosis prophylaxis - I recommend her to continue with aspirin 81 mg daily. Herpes zoster prophylaxis - I recommend her to continue with acyclovir 400 mg twice daily. I also recommend to continue with zoledronic acid every twelve month intervals. For her hypertension, I recommend to continue with metoprolol 50 mg daily for now.   Her blood pressure reading on today is normal. I also recommend her to continue with vitamin B12 supplementation. Health maintenance - I asked her to have age-appropriate cancer screening, exercise, low-fat and low-sodium diet. I answered all her questions and concerns for today. Recent imaging and labs were reviewed and discussed with the patient.

## 2023-01-04 ENCOUNTER — HOSPITAL ENCOUNTER (OUTPATIENT)
Dept: INFUSION THERAPY | Age: 73
Discharge: HOME | End: 2023-01-04
Payer: MEDICARE

## 2023-01-04 DIAGNOSIS — C90.00 MULTIPLE MYELOMA NOT HAVING ACHIEVED REMISSION (HCC): ICD-10-CM

## 2023-01-04 LAB
ALBUMIN SERPL-MCNC: 3.8 GM/DL (ref 3.4–5)
ALP BLD-CCNC: 84 IU/L (ref 40–129)
ALT SERPL-CCNC: 13 U/L (ref 10–40)
ANION GAP SERPL CALCULATED.3IONS-SCNC: 12 MMOL/L (ref 4–16)
AST SERPL-CCNC: 13 IU/L (ref 15–37)
BASOPHILS ABSOLUTE: 0 K/CU MM
BASOPHILS RELATIVE PERCENT: 0.9 % (ref 0–1)
BILIRUB SERPL-MCNC: 0.4 MG/DL (ref 0–1)
BUN BLDV-MCNC: 18 MG/DL (ref 6–23)
CALCIUM SERPL-MCNC: 8.2 MG/DL (ref 8.3–10.6)
CHLORIDE BLD-SCNC: 107 MMOL/L (ref 99–110)
CO2: 21 MMOL/L (ref 21–32)
CREAT SERPL-MCNC: 0.9 MG/DL (ref 0.6–1.1)
DIFFERENTIAL TYPE: ABNORMAL
EOSINOPHILS ABSOLUTE: 0 K/CU MM
EOSINOPHILS RELATIVE PERCENT: 0.4 % (ref 0–3)
ERYTHROCYTE SEDIMENTATION RATE: 39 MM/HR (ref 0–30)
GFR SERPL CREATININE-BSD FRML MDRD: >60 ML/MIN/1.73M2
GLUCOSE BLD-MCNC: 121 MG/DL (ref 70–99)
HCT VFR BLD CALC: 30 % (ref 37–47)
HEMOGLOBIN: 9.3 GM/DL (ref 12.5–16)
IGA: 241 MG/DL (ref 69–382)
IGG,SERUM: 871 MG/DL (ref 723–1685)
IGM,SERUM: 28 MG/DL (ref 62–277)
LACTATE DEHYDROGENASE: 171 IU/L (ref 120–246)
LYMPHOCYTES ABSOLUTE: 0.4 K/CU MM
LYMPHOCYTES RELATIVE PERCENT: 18.2 % (ref 24–44)
MCH RBC QN AUTO: 28.4 PG (ref 27–31)
MCHC RBC AUTO-ENTMCNC: 31 % (ref 32–36)
MCV RBC AUTO: 91.7 FL (ref 78–100)
MONOCYTES ABSOLUTE: 0.1 K/CU MM
MONOCYTES RELATIVE PERCENT: 3 % (ref 0–4)
PDW BLD-RTO: 18.3 % (ref 11.7–14.9)
PLATELET # BLD: 144 K/CU MM (ref 140–440)
PMV BLD AUTO: 11 FL (ref 7.5–11.1)
POTASSIUM SERPL-SCNC: 4.3 MMOL/L (ref 3.5–5.1)
RBC # BLD: 3.27 M/CU MM (ref 4.2–5.4)
SEGMENTED NEUTROPHILS ABSOLUTE COUNT: 1.8 K/CU MM
SEGMENTED NEUTROPHILS RELATIVE PERCENT: 77.5 % (ref 36–66)
SODIUM BLD-SCNC: 140 MMOL/L (ref 135–145)
TOTAL PROTEIN: 6.2 GM/DL (ref 6.4–8.2)
WBC # BLD: 2.3 K/CU MM (ref 4–10.5)

## 2023-01-04 PROCEDURE — 86320 SERUM IMMUNOELECTROPHORESIS: CPT

## 2023-01-04 PROCEDURE — 84165 PROTEIN E-PHORESIS SERUM: CPT

## 2023-01-04 PROCEDURE — 85025 COMPLETE CBC W/AUTO DIFF WBC: CPT

## 2023-01-04 PROCEDURE — 82232 ASSAY OF BETA-2 PROTEIN: CPT

## 2023-01-04 PROCEDURE — 84155 ASSAY OF PROTEIN SERUM: CPT

## 2023-01-04 PROCEDURE — 83883 ASSAY NEPHELOMETRY NOT SPEC: CPT

## 2023-01-04 PROCEDURE — 83615 LACTATE (LD) (LDH) ENZYME: CPT

## 2023-01-04 PROCEDURE — 85652 RBC SED RATE AUTOMATED: CPT

## 2023-01-04 PROCEDURE — 36415 COLL VENOUS BLD VENIPUNCTURE: CPT

## 2023-01-04 PROCEDURE — 80053 COMPREHEN METABOLIC PANEL: CPT

## 2023-01-04 PROCEDURE — 82784 ASSAY IGA/IGD/IGG/IGM EACH: CPT

## 2023-01-05 DIAGNOSIS — C90.00 MULTIPLE MYELOMA NOT HAVING ACHIEVED REMISSION (HCC): ICD-10-CM

## 2023-01-05 RX ORDER — LENALIDOMIDE 10 MG/1
CAPSULE ORAL
Qty: 28 CAPSULE | Refills: 0 | Status: ACTIVE | OUTPATIENT
Start: 2023-01-05

## 2023-01-05 NOTE — PROGRESS NOTES
Revlimid 10 mg chemo capsules reordered and sent to [facility] [address]. Katerin Abernathy # [id number] obtained through [employer] risk management/REMS portal. Auth valid for 30 days.

## 2023-01-06 LAB
ALBUMIN ELP: 3.1 GM/DL (ref 3.2–5.6)
ALPHA-1-GLOBULIN: 0.3 GM/DL (ref 0.1–0.4)
ALPHA-2-GLOBULIN: 0.7 GM/DL (ref 0.4–1.2)
BETA GLOBULIN: 1.1 GM/DL (ref 0.5–1.3)
BETA-2 MICROGLOBULIN: 2.6 MG/L
GAMMA GLOBULIN: 0.9 GM/DL (ref 0.5–1.6)
KAPPA QUANT FREE LIGHT CHAINS: 27.42 MG/L (ref 3.3–19.4)
KAPPA/LAMBDA FREE LIGHT CHAIN RATIO: 1.15 (ref 0.26–1.65)
LAMBDA FREE LIGHT CHAINS QNT: 23.93 MG/L (ref 5.71–26.3)
TOTAL PROTEIN: 6.2 GM/DL (ref 6.4–8.2)

## 2023-01-10 LAB
ALBUMIN ELP: 3.1 GM/DL (ref 3.2–5.6)
ALPHA-1-GLOBULIN: 0.3 GM/DL (ref 0.1–0.4)
ALPHA-2-GLOBULIN: 0.7 GM/DL (ref 0.4–1.2)
BETA GLOBULIN: 1.1 GM/DL (ref 0.5–1.3)
GAMMA GLOBULIN: 0.9 GM/DL (ref 0.5–1.6)
SPEP INTERPRETATION: ABNORMAL
SPEP INTERPRETATION: NORMAL
TOTAL PROTEIN: 6.2 GM/DL (ref 6.4–8.2)

## 2023-01-11 ENCOUNTER — OFFICE VISIT (OUTPATIENT)
Dept: ONCOLOGY | Age: 73
End: 2023-01-11
Payer: MEDICARE

## 2023-01-11 ENCOUNTER — HOSPITAL ENCOUNTER (OUTPATIENT)
Dept: INFUSION THERAPY | Age: 73
Discharge: HOME | End: 2023-01-11
Payer: MEDICARE

## 2023-01-11 VITALS
TEMPERATURE: 96.8 F | SYSTOLIC BLOOD PRESSURE: 134 MMHG | DIASTOLIC BLOOD PRESSURE: 57 MMHG | WEIGHT: 188 LBS | BODY MASS INDEX: 30.22 KG/M2 | RESPIRATION RATE: 18 BRPM | HEART RATE: 56 BPM | HEIGHT: 66 IN | OXYGEN SATURATION: 99 %

## 2023-01-11 DIAGNOSIS — C90.00 MULTIPLE MYELOMA NOT HAVING ACHIEVED REMISSION (HCC): Primary | ICD-10-CM

## 2023-01-11 PROCEDURE — G8427 DOCREV CUR MEDS BY ELIG CLIN: HCPCS | Performed by: INTERNAL MEDICINE

## 2023-01-11 PROCEDURE — 1123F ACP DISCUSS/DSCN MKR DOCD: CPT | Performed by: INTERNAL MEDICINE

## 2023-01-11 PROCEDURE — 99214 OFFICE O/P EST MOD 30 MIN: CPT | Performed by: INTERNAL MEDICINE

## 2023-01-11 PROCEDURE — 99211 OFF/OP EST MAY X REQ PHY/QHP: CPT

## 2023-01-11 PROCEDURE — G8417 CALC BMI ABV UP PARAM F/U: HCPCS | Performed by: INTERNAL MEDICINE

## 2023-01-11 PROCEDURE — 3017F COLORECTAL CA SCREEN DOC REV: CPT | Performed by: INTERNAL MEDICINE

## 2023-01-11 PROCEDURE — G8400 PT W/DXA NO RESULTS DOC: HCPCS | Performed by: INTERNAL MEDICINE

## 2023-01-11 PROCEDURE — 1036F TOBACCO NON-USER: CPT | Performed by: INTERNAL MEDICINE

## 2023-01-11 PROCEDURE — G8484 FLU IMMUNIZE NO ADMIN: HCPCS | Performed by: INTERNAL MEDICINE

## 2023-01-11 PROCEDURE — 1090F PRES/ABSN URINE INCON ASSESS: CPT | Performed by: INTERNAL MEDICINE

## 2023-01-11 NOTE — PROGRESS NOTES
MA Rooming Questions  Patient: Guera Weeks  MRN: [id number]    Date: 1/11/2023        1. Do you have any new issues? Yes - pt c/o diarrhea. Would like to know if there is something she can take? Imodium is not giving her relief. 2. Do you need any refills on medications?    no    3. Have you had any imaging done since your last visit?   no    4. Have you been hospitalized or seen in the emergency room since your last visit here?   no    5. Did the patient have a depression screening completed today?  No    No data recorded     PHQ-9 Given to (if applicable):               PHQ-9 Score (if applicable):                     [] Positive     []  Negative              Does question #9 need addressed (if applicable)                     [] Yes    []  No               Heather Ayon MA

## 2023-01-13 ENCOUNTER — CLINICAL DOCUMENTATION (OUTPATIENT)
Dept: ONCOLOGY | Age: 73
End: 2023-01-13

## 2023-01-13 NOTE — PROGRESS NOTES
Received VM from patient c/o diarrhea. Patient states that she has been taking imodium as instructed, but still having diarrhea. Patient inquiring if RX can be sent to pharmacy. Attempted to call patient back @ [phone] to discuss symptoms; however, no answer. VM left with this RN direct number requesting call back.

## 2023-01-14 DIAGNOSIS — C90.00 MULTIPLE MYELOMA NOT HAVING ACHIEVED REMISSION (HCC): ICD-10-CM

## 2023-01-14 RX ORDER — DIPHENOXYLATE HYDROCHLORIDE AND ATROPINE SULFATE 2.5; .025 MG/1; MG/1
2 TABLET ORAL 4 TIMES DAILY PRN
Qty: 240 TABLET | Refills: 0 | Status: SHIPPED | OUTPATIENT
Start: 2023-01-14 | End: 2023-02-13

## 2023-01-18 ENCOUNTER — HOSPITAL ENCOUNTER (OUTPATIENT)
Dept: WOMENS IMAGING | Age: 73
Discharge: HOME | End: 2023-01-18
Payer: MEDICARE

## 2023-01-18 DIAGNOSIS — Z12.31 ENCOUNTER FOR SCREENING MAMMOGRAM FOR BREAST CANCER: ICD-10-CM

## 2023-01-18 PROCEDURE — 77067 SCR MAMMO BI INCL CAD: CPT

## 2023-01-23 ENCOUNTER — CLINICAL DOCUMENTATION (OUTPATIENT)
Dept: ONCOLOGY | Age: 73
End: 2023-01-23

## 2023-01-23 ENCOUNTER — TELEPHONE (OUTPATIENT)
Dept: ONCOLOGY | Age: 73
End: 2023-01-23

## 2023-02-02 DIAGNOSIS — C90.00 MULTIPLE MYELOMA NOT HAVING ACHIEVED REMISSION (HCC): ICD-10-CM

## 2023-02-02 RX ORDER — LENALIDOMIDE 10 MG/1
CAPSULE ORAL
Qty: 28 CAPSULE | Refills: 0 | Status: ACTIVE | OUTPATIENT
Start: 2023-02-02

## 2023-02-02 NOTE — TELEPHONE ENCOUNTER
Revlimid 10 mg chemo capsules reordered and e-scribed to [facility] [address]. Prescriber survey completed. Tiki Catalan # [id number] obtained through [employer] risk management/REMS portal. Auth valid for 30 days.

## 2023-02-06 DIAGNOSIS — C90.00 MULTIPLE MYELOMA NOT HAVING ACHIEVED REMISSION (HCC): Primary | ICD-10-CM

## 2023-02-13 RX ORDER — METHYLPREDNISOLONE SODIUM SUCCINATE 125 MG/2ML
125 INJECTION, POWDER, LYOPHILIZED, FOR SOLUTION INTRAMUSCULAR; INTRAVENOUS ONCE
Status: CANCELLED | OUTPATIENT
Start: 2023-02-15 | End: 2023-02-15

## 2023-02-13 RX ORDER — HEPARIN SODIUM (PORCINE) LOCK FLUSH IV SOLN 100 UNIT/ML 100 UNIT/ML
500 SOLUTION INTRAVENOUS PRN
Status: CANCELLED | OUTPATIENT
Start: 2023-02-15

## 2023-02-13 RX ORDER — SODIUM CHLORIDE 0.9 % (FLUSH) 0.9 %
5-40 SYRINGE (ML) INJECTION PRN
Status: CANCELLED | OUTPATIENT
Start: 2023-02-15

## 2023-02-13 RX ORDER — EPINEPHRINE 1 MG/ML
0.3 INJECTION, SOLUTION, CONCENTRATE INTRAVENOUS PRN
Status: CANCELLED | OUTPATIENT
Start: 2023-02-15

## 2023-02-13 RX ORDER — SODIUM CHLORIDE 9 MG/ML
INJECTION, SOLUTION INTRAVENOUS CONTINUOUS
Status: CANCELLED | OUTPATIENT
Start: 2023-02-15

## 2023-02-13 RX ORDER — SODIUM CHLORIDE 9 MG/ML
20 INJECTION, SOLUTION INTRAVENOUS ONCE
Status: CANCELLED | OUTPATIENT
Start: 2023-02-15 | End: 2023-02-15

## 2023-02-13 RX ORDER — DIPHENHYDRAMINE HYDROCHLORIDE 50 MG/ML
50 INJECTION INTRAMUSCULAR; INTRAVENOUS ONCE
Status: CANCELLED | OUTPATIENT
Start: 2023-02-15 | End: 2023-02-15

## 2023-02-13 RX ORDER — SODIUM CHLORIDE 9 MG/ML
25 INJECTION, SOLUTION INTRAVENOUS PRN
Status: CANCELLED | OUTPATIENT
Start: 2023-02-15

## 2023-02-13 RX ORDER — WATER 1000 ML/1000ML
2.2 INJECTION, SOLUTION INTRAVENOUS ONCE
Status: CANCELLED | OUTPATIENT
Start: 2023-02-15 | End: 2023-02-15

## 2023-02-15 ENCOUNTER — HOSPITAL ENCOUNTER (OUTPATIENT)
Dept: INFUSION THERAPY | Age: 73
Discharge: HOME | End: 2023-02-15
Payer: MEDICARE

## 2023-02-15 VITALS
OXYGEN SATURATION: 98 % | DIASTOLIC BLOOD PRESSURE: 73 MMHG | RESPIRATION RATE: 18 BRPM | TEMPERATURE: 97.8 F | SYSTOLIC BLOOD PRESSURE: 155 MMHG | HEART RATE: 50 BPM | BODY MASS INDEX: 31.31 KG/M2 | WEIGHT: 194 LBS

## 2023-02-15 DIAGNOSIS — C90.00 MULTIPLE MYELOMA NOT HAVING ACHIEVED REMISSION (HCC): Primary | ICD-10-CM

## 2023-02-15 LAB
ALBUMIN SERPL-MCNC: 3.6 GM/DL (ref 3.4–5)
ALP BLD-CCNC: 99 IU/L (ref 40–128)
ALT SERPL-CCNC: 19 U/L (ref 10–40)
ANION GAP SERPL CALCULATED.3IONS-SCNC: 11 MMOL/L (ref 4–16)
AST SERPL-CCNC: 21 IU/L (ref 15–37)
BILIRUB SERPL-MCNC: 0.4 MG/DL (ref 0–1)
BUN SERPL-MCNC: 17 MG/DL (ref 6–23)
CALCIUM SERPL-MCNC: 8.8 MG/DL (ref 8.3–10.6)
CHLORIDE BLD-SCNC: 102 MMOL/L (ref 99–110)
CO2: 24 MMOL/L (ref 21–32)
CREAT SERPL-MCNC: 0.9 MG/DL (ref 0.6–1.1)
EGFR, POC: 54 ML/MIN/1.73M2
GFR SERPL CREATININE-BSD FRML MDRD: >60 ML/MIN/1.73M2
GLUCOSE BLD-MCNC: 154 MG/DL (ref 70–99)
GLUCOSE SERPL-MCNC: 148 MG/DL (ref 70–99)
POC BUN: 17 MG/DL (ref 8–26)
POC CALCIUM: 1.23 MMOL/L (ref 1.12–1.32)
POC CHLORIDE: 105 MMOL/L (ref 98–109)
POC CO2: 25 MMOL/L (ref 21–32)
POC CREATININE: 1.1 MG/DL (ref 0.6–1.1)
POTASSIUM SERPL-SCNC: 4.4 MMOL/L (ref 3.5–4.5)
POTASSIUM SERPL-SCNC: 4.6 MMOL/L (ref 3.5–5.1)
SODIUM BLD-SCNC: 137 MMOL/L (ref 135–145)
SODIUM BLD-SCNC: 140 MMOL/L (ref 138–146)
SOURCE, BLOOD GAS: ABNORMAL
TOTAL PROTEIN: 5.5 GM/DL (ref 6.4–8.2)

## 2023-02-15 PROCEDURE — 96365 THER/PROPH/DIAG IV INF INIT: CPT

## 2023-02-15 PROCEDURE — 6360000002 HC RX W HCPCS: Performed by: INTERNAL MEDICINE

## 2023-02-15 PROCEDURE — 2580000003 HC RX 258: Performed by: INTERNAL MEDICINE

## 2023-02-15 PROCEDURE — 80053 COMPREHEN METABOLIC PANEL: CPT

## 2023-02-15 RX ORDER — SODIUM CHLORIDE 9 MG/ML
20 INJECTION, SOLUTION INTRAVENOUS ONCE
Status: DISCONTINUED | OUTPATIENT
Start: 2023-02-15 | End: 2023-02-16 | Stop reason: HOSPADM

## 2023-02-15 RX ADMIN — SODIUM CHLORIDE 20 ML/HR: 9 INJECTION, SOLUTION INTRAVENOUS at 12:34

## 2023-02-15 RX ADMIN — ZOLEDRONIC ACID 3.5 MG: 4 INJECTION, SOLUTION, CONCENTRATE INTRAVENOUS at 12:34

## 2023-02-27 ENCOUNTER — CLINICAL DOCUMENTATION (OUTPATIENT)
Dept: ONCOLOGY | Age: 73
End: 2023-02-27

## 2023-03-10 ENCOUNTER — TELEPHONE (OUTPATIENT)
Dept: ONCOLOGY | Age: 73
End: 2023-03-10

## 2023-03-10 NOTE — TELEPHONE ENCOUNTER
I  called and lvm for patient that Peg Hersbeth Lee will follow up with richard Monday to see what they may need for her FMLA

## 2023-03-13 ENCOUNTER — CLINICAL DOCUMENTATION (OUTPATIENT)
Dept: ONCOLOGY | Age: 73
End: 2023-03-13

## 2023-03-13 DIAGNOSIS — C90.00 MULTIPLE MYELOMA NOT HAVING ACHIEVED REMISSION (HCC): ICD-10-CM

## 2023-03-13 RX ORDER — LENALIDOMIDE 10 MG/1
CAPSULE ORAL
Qty: 28 CAPSULE | Refills: 0 | Status: ACTIVE | OUTPATIENT
Start: 2023-03-13

## 2023-03-13 NOTE — PROGRESS NOTES
Received VM from patient inquiring about oral B12. Patient wants to know if she can  medication OTC instead of having a new RX sent to pharmacy. Patient also states she is on her last week supply of oral chemo and will need refill. Revlimid 10 mg chemo capsules reordered and e-scribed to [facility] [address]. Prescriber survey completed. Daniella Ackerman # [id number] obtained through [employer] management/[employer] portal. Auth valid for 30 days. Attempted to call patient back @ [phone] to notify; however, no answer. VM left with this RN direct number. Update provided regarding revlimid and advised that she can obtain Vit B-12 1,000 mcg tablets OTC.

## 2023-03-18 DIAGNOSIS — C90.00 MULTIPLE MYELOMA NOT HAVING ACHIEVED REMISSION (HCC): ICD-10-CM

## 2023-03-20 RX ORDER — METOPROLOL SUCCINATE 25 MG/1
TABLET, EXTENDED RELEASE ORAL
Qty: 90 TABLET | Refills: 0 | Status: SHIPPED | OUTPATIENT
Start: 2023-03-20

## 2023-03-21 ENCOUNTER — CLINICAL DOCUMENTATION (OUTPATIENT)
Dept: ONCOLOGY | Age: 73
End: 2023-03-21

## 2023-03-21 NOTE — PROGRESS NOTES
Called [facility] [employer] @ [phone] spoke to Jose G Gould, she states they just need more specific description of restrictions for standing and lifting, revision to original done w/those restrictions listed in more depth and faxed back, faxed and confirm rec'd, patient has also been notified but no answer and had to leave a VM

## 2023-04-05 ENCOUNTER — HOSPITAL ENCOUNTER (OUTPATIENT)
Dept: INFUSION THERAPY | Age: 73
Discharge: HOME | End: 2023-04-05
Payer: MEDICARE

## 2023-04-05 DIAGNOSIS — C90.00 MULTIPLE MYELOMA NOT HAVING ACHIEVED REMISSION (HCC): ICD-10-CM

## 2023-04-05 LAB
ALBUMIN SERPL-MCNC: 3.7 GM/DL (ref 3.4–5)
ALP BLD-CCNC: 66 IU/L (ref 40–129)
ALT SERPL-CCNC: 21 U/L (ref 10–40)
ANION GAP SERPL CALCULATED.3IONS-SCNC: 10 MMOL/L (ref 4–16)
AST SERPL-CCNC: 13 IU/L (ref 15–37)
BASOPHILS ABSOLUTE: 0 K/CU MM
BASOPHILS RELATIVE PERCENT: 0.2 % (ref 0–1)
BILIRUB SERPL-MCNC: 0.7 MG/DL (ref 0–1)
BUN SERPL-MCNC: 39 MG/DL (ref 6–23)
CALCIUM SERPL-MCNC: 8.6 MG/DL (ref 8.3–10.6)
CHLORIDE BLD-SCNC: 104 MMOL/L (ref 99–110)
CO2: 26 MMOL/L (ref 21–32)
CREAT SERPL-MCNC: 1 MG/DL (ref 0.6–1.1)
DIFFERENTIAL TYPE: ABNORMAL
EOSINOPHILS ABSOLUTE: 0 K/CU MM
EOSINOPHILS RELATIVE PERCENT: 0.7 % (ref 0–3)
ERYTHROCYTE SEDIMENTATION RATE: 6 MM/HR (ref 0–30)
GFR SERPL CREATININE-BSD FRML MDRD: >60 ML/MIN/1.73M2
GLUCOSE SERPL-MCNC: 86 MG/DL (ref 70–99)
HCT VFR BLD CALC: 29.8 % (ref 37–47)
HEMOGLOBIN: 9.4 GM/DL (ref 12.5–16)
IGA: 188 MG/DL (ref 69–382)
IGG,SERUM: 716 MG/DL (ref 723–1685)
IGM,SERUM: <25 MG/DL (ref 62–277)
LACTATE DEHYDROGENASE: 216 IU/L (ref 120–246)
LYMPHOCYTES ABSOLUTE: 0.6 K/CU MM
LYMPHOCYTES RELATIVE PERCENT: 10.8 % (ref 24–44)
MCH RBC QN AUTO: 29.8 PG (ref 27–31)
MCHC RBC AUTO-ENTMCNC: 31.5 % (ref 32–36)
MCV RBC AUTO: 94.6 FL (ref 78–100)
MONOCYTES ABSOLUTE: 0.9 K/CU MM
MONOCYTES RELATIVE PERCENT: 16.7 % (ref 0–4)
PDW BLD-RTO: 19.2 % (ref 11.7–14.9)
PLATELET # BLD: 172 K/CU MM (ref 140–440)
PMV BLD AUTO: 10.1 FL (ref 7.5–11.1)
POTASSIUM SERPL-SCNC: 5.1 MMOL/L (ref 3.5–5.1)
RBC # BLD: 3.15 M/CU MM (ref 4.2–5.4)
SEGMENTED NEUTROPHILS ABSOLUTE COUNT: 4 K/CU MM
SEGMENTED NEUTROPHILS RELATIVE PERCENT: 71.6 % (ref 36–66)
SODIUM BLD-SCNC: 140 MMOL/L (ref 135–145)
TOTAL PROTEIN: 5.6 GM/DL (ref 6.4–8.2)
WBC # BLD: 5.6 K/CU MM (ref 4–10.5)

## 2023-04-05 PROCEDURE — 85025 COMPLETE CBC W/AUTO DIFF WBC: CPT

## 2023-04-05 PROCEDURE — 82784 ASSAY IGA/IGD/IGG/IGM EACH: CPT

## 2023-04-05 PROCEDURE — 36415 COLL VENOUS BLD VENIPUNCTURE: CPT

## 2023-04-05 PROCEDURE — 85652 RBC SED RATE AUTOMATED: CPT

## 2023-04-05 PROCEDURE — 83883 ASSAY NEPHELOMETRY NOT SPEC: CPT

## 2023-04-05 PROCEDURE — 80053 COMPREHEN METABOLIC PANEL: CPT

## 2023-04-05 PROCEDURE — 86320 SERUM IMMUNOELECTROPHORESIS: CPT

## 2023-04-05 PROCEDURE — 83615 LACTATE (LD) (LDH) ENZYME: CPT

## 2023-04-05 PROCEDURE — 82232 ASSAY OF BETA-2 PROTEIN: CPT

## 2023-04-05 PROCEDURE — 84155 ASSAY OF PROTEIN SERUM: CPT

## 2023-04-05 PROCEDURE — 84165 PROTEIN E-PHORESIS SERUM: CPT

## 2023-04-07 LAB
ALBUMIN SERPL ELPH-MCNC: 3.2 GM/DL (ref 3.2–5.6)
ALPHA-1-GLOBULIN: 0.2 GM/DL (ref 0.1–0.4)
ALPHA-2-GLOBULIN: 0.6 GM/DL (ref 0.4–1.2)
B2 MICROGLOB SERPL-MCNC: 2.1 MG/L
BETA GLOBULIN: 0.9 GM/DL (ref 0.5–1.3)
GAMMA GLOBULIN: 0.7 GM/DL (ref 0.5–1.6)
KAPPA LC FREE SER-MCNC: 14.79 MG/L (ref 3.3–19.4)
KAPPA LC FREE/LAMBDA FREE SER NEPH: 1.22 {RATIO} (ref 0.26–1.65)
LAMBDA LC FREE SERPL-MCNC: 12.17 MG/L (ref 5.71–26.3)
SPEP INTERPRETATION: ABNORMAL
SPEP INTERPRETATION: NORMAL
TOTAL PROTEIN: 5.6 GM/DL (ref 6.4–8.2)

## 2023-04-12 ENCOUNTER — HOSPITAL ENCOUNTER (OUTPATIENT)
Dept: INFUSION THERAPY | Age: 73
Discharge: HOME | End: 2023-04-12
Payer: MEDICARE

## 2023-04-12 PROCEDURE — 99211 OFF/OP EST MAY X REQ PHY/QHP: CPT

## 2023-05-08 DIAGNOSIS — C90.00 MULTIPLE MYELOMA NOT HAVING ACHIEVED REMISSION (HCC): ICD-10-CM

## 2023-05-08 RX ORDER — SODIUM CHLORIDE 9 MG/ML
25 INJECTION, SOLUTION INTRAVENOUS PRN
Status: CANCELLED | OUTPATIENT
Start: 2023-05-10

## 2023-05-08 RX ORDER — DIPHENHYDRAMINE HYDROCHLORIDE 50 MG/ML
50 INJECTION INTRAMUSCULAR; INTRAVENOUS ONCE
Status: CANCELLED | OUTPATIENT
Start: 2023-05-10 | End: 2023-05-10

## 2023-05-08 RX ORDER — SODIUM CHLORIDE 0.9 % (FLUSH) 0.9 %
5-40 SYRINGE (ML) INJECTION PRN
Status: CANCELLED | OUTPATIENT
Start: 2023-05-10

## 2023-05-08 RX ORDER — WATER 1000 ML/1000ML
2.2 INJECTION, SOLUTION INTRAVENOUS ONCE
Status: CANCELLED | OUTPATIENT
Start: 2023-05-10 | End: 2023-05-10

## 2023-05-08 RX ORDER — METHYLPREDNISOLONE SODIUM SUCCINATE 125 MG/2ML
125 INJECTION, POWDER, LYOPHILIZED, FOR SOLUTION INTRAMUSCULAR; INTRAVENOUS ONCE
Status: CANCELLED | OUTPATIENT
Start: 2023-05-10 | End: 2023-05-10

## 2023-05-08 RX ORDER — SODIUM CHLORIDE 9 MG/ML
INJECTION, SOLUTION INTRAVENOUS CONTINUOUS
Status: CANCELLED | OUTPATIENT
Start: 2023-05-10

## 2023-05-08 RX ORDER — EPINEPHRINE 1 MG/ML
0.3 INJECTION, SOLUTION, CONCENTRATE INTRAVENOUS PRN
Status: CANCELLED | OUTPATIENT
Start: 2023-05-10

## 2023-05-08 RX ORDER — FAMOTIDINE 10 MG/ML
20 INJECTION, SOLUTION INTRAVENOUS ONCE
Status: CANCELLED | OUTPATIENT
Start: 2023-05-10 | End: 2023-05-10

## 2023-05-08 RX ORDER — SODIUM CHLORIDE 9 MG/ML
20 INJECTION, SOLUTION INTRAVENOUS ONCE
Status: CANCELLED | OUTPATIENT
Start: 2023-05-10 | End: 2023-05-10

## 2023-05-08 RX ORDER — HEPARIN SODIUM (PORCINE) LOCK FLUSH IV SOLN 100 UNIT/ML 100 UNIT/ML
500 SOLUTION INTRAVENOUS PRN
Status: CANCELLED | OUTPATIENT
Start: 2023-05-10

## 2023-05-08 RX ORDER — LENALIDOMIDE 10 MG/1
CAPSULE ORAL
Qty: 28 CAPSULE | Refills: 0 | Status: ACTIVE | OUTPATIENT
Start: 2023-05-08

## 2023-05-08 NOTE — PROGRESS NOTES
Received VM from patient stating that she is on last week of this cycle for oral chemo and will need refill. Revlimid 10 mg chemo capsules reordered and sent to [facility] [address]. Provider survey completed. New auth # [id number] obtained through [employer] portal. Auth valid for 30 days.

## 2023-05-10 ENCOUNTER — TELEPHONE (OUTPATIENT)
Dept: INFUSION THERAPY | Age: 73
End: 2023-05-10

## 2023-05-12 NOTE — TELEPHONE ENCOUNTER
Upon speaking with pt and Dr. Al Echavarria, She will start receiving Zometa every 6 months instead of every 12 weeks.

## 2023-06-06 DIAGNOSIS — C90.00 MULTIPLE MYELOMA NOT HAVING ACHIEVED REMISSION (HCC): ICD-10-CM

## 2023-06-06 RX ORDER — LENALIDOMIDE 10 MG/1
CAPSULE ORAL
Qty: 28 CAPSULE | Refills: 0 | Status: ACTIVE | OUTPATIENT
Start: 2023-06-06

## 2023-06-06 NOTE — PROGRESS NOTES
Revlimid 10 mg chemo capsules reordered and e-scribed to [facility] [address]. Prescriber survey completed. New auth # [id number] obtained through "[employer] portal. Auth valid for 30 days and attached to RX.

## 2023-06-09 ENCOUNTER — CLINICAL DOCUMENTATION (OUTPATIENT)
Dept: ONCOLOGY | Age: 73
End: 2023-06-09

## 2023-06-09 DIAGNOSIS — N30.00 ACUTE CYSTITIS WITHOUT HEMATURIA: Primary | ICD-10-CM

## 2023-06-09 RX ORDER — LEVOFLOXACIN 250 MG/1
250 TABLET ORAL DAILY
Qty: 5 TABLET | Refills: 0 | Status: SHIPPED | OUTPATIENT
Start: 2023-06-09 | End: 2023-06-14

## 2023-06-09 NOTE — PROGRESS NOTES
Received VM from patient stating she has a UTI and requesting an antibiotic. Physician aware. RX for levaquin 250 mg daily x5 days e-scribed to [facility] DR YOHAN EUBANKS on [facility] per physician order. Called patient @ [phone] to update. Patient voices understanding. Denies further needs at this time.

## 2023-06-26 DIAGNOSIS — C90.00 MULTIPLE MYELOMA NOT HAVING ACHIEVED REMISSION (HCC): ICD-10-CM

## 2023-06-26 RX ORDER — LENALIDOMIDE 10 MG/1
CAPSULE ORAL
Qty: 28 CAPSULE | Refills: 0 | Status: ACTIVE | OUTPATIENT
Start: 2023-06-26

## 2023-06-30 DIAGNOSIS — C90.00 MULTIPLE MYELOMA NOT HAVING ACHIEVED REMISSION (HCC): Primary | ICD-10-CM

## 2023-06-30 RX ORDER — DIPHENOXYLATE HYDROCHLORIDE AND ATROPINE SULFATE 2.5; .025 MG/1; MG/1
2 TABLET ORAL 4 TIMES DAILY PRN
Qty: 120 TABLET | Refills: 0 | Status: SHIPPED | OUTPATIENT
Start: 2023-06-30 | End: 2023-07-15

## 2023-07-14 ENCOUNTER — HOSPITAL ENCOUNTER (OUTPATIENT)
Dept: CARDIAC REHAB | Age: 73
Discharge: HOME | End: 2023-07-14
Attending: INTERNAL MEDICINE
Payer: MEDICARE

## 2023-07-14 DIAGNOSIS — R01.1 CARDIAC MURMUR: ICD-10-CM

## 2023-07-14 LAB
LV EF: 58 %
LVEF MODALITY: NORMAL

## 2023-07-14 PROCEDURE — 93306 TTE W/DOPPLER COMPLETE: CPT

## 2023-07-19 ENCOUNTER — HOSPITAL ENCOUNTER (OUTPATIENT)
Dept: INFUSION THERAPY | Age: 73
Discharge: HOME | End: 2023-07-19
Payer: MEDICARE

## 2023-07-19 ENCOUNTER — TELEPHONE (OUTPATIENT)
Dept: ONCOLOGY | Age: 73
End: 2023-07-19

## 2023-07-19 DIAGNOSIS — N30.00 ACUTE CYSTITIS WITHOUT HEMATURIA: ICD-10-CM

## 2023-07-19 DIAGNOSIS — C90.00 MULTIPLE MYELOMA NOT HAVING ACHIEVED REMISSION (HCC): ICD-10-CM

## 2023-07-19 LAB
ALBUMIN SERPL-MCNC: 3.5 GM/DL (ref 3.4–5)
ALP BLD-CCNC: 99 IU/L (ref 40–129)
ALT SERPL-CCNC: 13 U/L (ref 10–40)
ANION GAP SERPL CALCULATED.3IONS-SCNC: 7 MMOL/L (ref 4–16)
AST SERPL-CCNC: 15 IU/L (ref 15–37)
BASOPHILS ABSOLUTE: 0 K/CU MM
BASOPHILS RELATIVE PERCENT: 2.2 % (ref 0–1)
BILIRUB SERPL-MCNC: 0.5 MG/DL (ref 0–1)
BUN SERPL-MCNC: 16 MG/DL (ref 6–23)
CALCIUM SERPL-MCNC: 8.6 MG/DL (ref 8.3–10.6)
CHLORIDE BLD-SCNC: 105 MMOL/L (ref 99–110)
CO2: 26 MMOL/L (ref 21–32)
CREAT SERPL-MCNC: 0.9 MG/DL (ref 0.6–1.1)
DIFFERENTIAL TYPE: ABNORMAL
EOSINOPHILS ABSOLUTE: 0.1 K/CU MM
EOSINOPHILS RELATIVE PERCENT: 5.4 % (ref 0–3)
ERYTHROCYTE SEDIMENTATION RATE: 23 MM/HR (ref 0–30)
GFR SERPL CREATININE-BSD FRML MDRD: >60 ML/MIN/1.73M2
GLUCOSE SERPL-MCNC: 83 MG/DL (ref 70–99)
HCT VFR BLD CALC: 26.8 % (ref 37–47)
HEMOGLOBIN: 8.2 GM/DL (ref 12.5–16)
IGA: 311 MG/DL (ref 69–382)
IGG,SERUM: 942 MG/DL (ref 723–1685)
IGM,SERUM: <25 MG/DL (ref 62–277)
LACTATE DEHYDROGENASE: 175 IU/L (ref 120–246)
LYMPHOCYTES ABSOLUTE: 0.9 K/CU MM
LYMPHOCYTES RELATIVE PERCENT: 45.7 % (ref 24–44)
MCH RBC QN AUTO: 29.6 PG (ref 27–31)
MCHC RBC AUTO-ENTMCNC: 30.6 % (ref 32–36)
MCV RBC AUTO: 96.8 FL (ref 78–100)
MONOCYTES ABSOLUTE: 0.3 K/CU MM
MONOCYTES RELATIVE PERCENT: 15.6 % (ref 0–4)
PDW BLD-RTO: 17.8 % (ref 11.7–14.9)
PLATELET # BLD: 133 K/CU MM (ref 140–440)
PMV BLD AUTO: 11.1 FL (ref 7.5–11.1)
POTASSIUM SERPL-SCNC: 4.2 MMOL/L (ref 3.5–5.1)
RBC # BLD: 2.77 M/CU MM (ref 4.2–5.4)
SEGMENTED NEUTROPHILS ABSOLUTE COUNT: 0.6 K/CU MM
SEGMENTED NEUTROPHILS RELATIVE PERCENT: 31.1 % (ref 36–66)
SODIUM BLD-SCNC: 138 MMOL/L (ref 135–145)
TOTAL PROTEIN: 5.7 GM/DL (ref 6.4–8.2)
WBC # BLD: 1.9 K/CU MM (ref 4–10.5)

## 2023-07-19 PROCEDURE — 80053 COMPREHEN METABOLIC PANEL: CPT

## 2023-07-19 PROCEDURE — 84165 PROTEIN E-PHORESIS SERUM: CPT

## 2023-07-19 PROCEDURE — 82232 ASSAY OF BETA-2 PROTEIN: CPT

## 2023-07-19 PROCEDURE — 82784 ASSAY IGA/IGD/IGG/IGM EACH: CPT

## 2023-07-19 PROCEDURE — 85025 COMPLETE CBC W/AUTO DIFF WBC: CPT

## 2023-07-19 PROCEDURE — 86320 SERUM IMMUNOELECTROPHORESIS: CPT

## 2023-07-19 PROCEDURE — 84155 ASSAY OF PROTEIN SERUM: CPT

## 2023-07-19 PROCEDURE — 83883 ASSAY NEPHELOMETRY NOT SPEC: CPT

## 2023-07-19 PROCEDURE — 85652 RBC SED RATE AUTOMATED: CPT

## 2023-07-19 PROCEDURE — 36415 COLL VENOUS BLD VENIPUNCTURE: CPT

## 2023-07-19 PROCEDURE — 83615 LACTATE (LD) (LDH) ENZYME: CPT

## 2023-07-19 RX ORDER — LEVOFLOXACIN 500 MG/1
500 TABLET, FILM COATED ORAL DAILY
Qty: 7 TABLET | Refills: 0 | Status: SHIPPED | OUTPATIENT
Start: 2023-07-19 | End: 2023-07-26

## 2023-07-19 NOTE — TELEPHONE ENCOUNTER
Per Dr. Samm Jones patient was contacted today @11:30 by this [address] to be instructed to start holding her Revlamid due to decreased WBC. Patient has already taken dose medication will be held for 7 days resuming on 7/28. Patient was also reminded of her upcoming appointment for and office visit along with chemo treatment on 8/2. Patient voiced understanding.

## 2023-07-20 ENCOUNTER — CLINICAL DOCUMENTATION (OUTPATIENT)
Dept: ONCOLOGY | Age: 73
End: 2023-07-20

## 2023-07-20 NOTE — PROGRESS NOTES
Received VM from patient requesting ATB for UTI symptoms. Physician already aware and sent RX for levaquin 500 mg daily x7 days to [address] [place] [address] on [address]. Attempted to call patient @ [phone] to update; however, no answer. VM left with this RN direct number should patient have any questions.

## 2023-07-21 LAB
ALBUMIN SERPL ELPH-MCNC: 2.9 GM/DL (ref 3.2–5.6)
ALPHA-1-GLOBULIN: 0.3 GM/DL (ref 0.1–0.4)
ALPHA-2-GLOBULIN: 0.6 GM/DL (ref 0.4–1.2)
B2 MICROGLOB SERPL-MCNC: 3.2 MG/L
BETA GLOBULIN: 1 GM/DL (ref 0.5–1.3)
GAMMA GLOBULIN: 0.9 GM/DL (ref 0.5–1.6)
KAPPA LC FREE SER-MCNC: 55.86 MG/L (ref 3.3–19.4)
KAPPA LC FREE/LAMBDA FREE SER NEPH: 1.6 {RATIO} (ref 0.26–1.65)
LAMBDA LC FREE SERPL-MCNC: 34.89 MG/L (ref 5.71–26.3)
TOTAL PROTEIN: 5.7 GM/DL (ref 6.4–8.2)

## 2023-07-24 LAB
ALBUMIN SERPL ELPH-MCNC: 2.9 GM/DL (ref 3.2–5.6)
ALPHA-1-GLOBULIN: 0.3 GM/DL (ref 0.1–0.4)
ALPHA-2-GLOBULIN: 0.6 GM/DL (ref 0.4–1.2)
BETA GLOBULIN: 1 GM/DL (ref 0.5–1.3)
GAMMA GLOBULIN: 0.9 GM/DL (ref 0.5–1.6)
SPEP INTERPRETATION: ABNORMAL
SPEP INTERPRETATION: NORMAL
TOTAL PROTEIN: 5.7 GM/DL (ref 6.4–8.2)

## 2023-07-24 RX ORDER — DEXAMETHASONE 4 MG/1
TABLET ORAL
Qty: 30 TABLET | Refills: 1 | Status: SHIPPED | OUTPATIENT
Start: 2023-07-24

## 2023-07-31 DIAGNOSIS — C90.00 MULTIPLE MYELOMA NOT HAVING ACHIEVED REMISSION (HCC): Primary | ICD-10-CM

## 2023-07-31 RX ORDER — SODIUM CHLORIDE 0.9 % (FLUSH) 0.9 %
5-40 SYRINGE (ML) INJECTION PRN
Status: CANCELLED | OUTPATIENT
Start: 2023-08-02

## 2023-07-31 RX ORDER — FAMOTIDINE 10 MG/ML
20 INJECTION, SOLUTION INTRAVENOUS
Status: CANCELLED | OUTPATIENT
Start: 2023-08-02

## 2023-07-31 RX ORDER — ALBUTEROL SULFATE 90 UG/1
4 AEROSOL, METERED RESPIRATORY (INHALATION) PRN
Status: CANCELLED | OUTPATIENT
Start: 2023-08-02

## 2023-07-31 RX ORDER — SODIUM CHLORIDE 9 MG/ML
5-250 INJECTION, SOLUTION INTRAVENOUS PRN
Status: CANCELLED | OUTPATIENT
Start: 2023-08-02

## 2023-07-31 RX ORDER — SODIUM CHLORIDE 9 MG/ML
INJECTION, SOLUTION INTRAVENOUS CONTINUOUS
Status: CANCELLED | OUTPATIENT
Start: 2023-08-02

## 2023-07-31 RX ORDER — ONDANSETRON 2 MG/ML
8 INJECTION INTRAMUSCULAR; INTRAVENOUS
Status: CANCELLED | OUTPATIENT
Start: 2023-08-02

## 2023-07-31 RX ORDER — ACETAMINOPHEN 325 MG/1
650 TABLET ORAL
Status: CANCELLED | OUTPATIENT
Start: 2023-08-02

## 2023-07-31 RX ORDER — HEPARIN 100 UNIT/ML
500 SYRINGE INTRAVENOUS PRN
Status: CANCELLED | OUTPATIENT
Start: 2023-08-02

## 2023-07-31 RX ORDER — EPINEPHRINE 1 MG/ML
0.3 INJECTION, SOLUTION, CONCENTRATE INTRAVENOUS PRN
Status: CANCELLED | OUTPATIENT
Start: 2023-08-02

## 2023-07-31 RX ORDER — ZOLEDRONIC ACID 0.04 MG/ML
4 INJECTION, SOLUTION INTRAVENOUS ONCE
Status: CANCELLED | OUTPATIENT
Start: 2023-08-02 | End: 2023-08-02

## 2023-07-31 RX ORDER — MEPERIDINE HYDROCHLORIDE 25 MG/ML
12.5 INJECTION INTRAMUSCULAR; INTRAVENOUS; SUBCUTANEOUS PRN
Status: CANCELLED | OUTPATIENT
Start: 2023-08-02

## 2023-07-31 RX ORDER — DIPHENHYDRAMINE HYDROCHLORIDE 50 MG/ML
50 INJECTION INTRAMUSCULAR; INTRAVENOUS
Status: CANCELLED | OUTPATIENT
Start: 2023-08-02

## 2023-08-02 ENCOUNTER — HOSPITAL ENCOUNTER (OUTPATIENT)
Dept: INFUSION THERAPY | Age: 73
Discharge: HOME | End: 2023-08-02
Payer: MEDICARE

## 2023-08-02 ENCOUNTER — OFFICE VISIT (OUTPATIENT)
Dept: ONCOLOGY | Age: 73
End: 2023-08-02
Payer: MEDICARE

## 2023-08-02 VITALS
WEIGHT: 182 LBS | BODY MASS INDEX: 29.25 KG/M2 | HEIGHT: 66 IN | OXYGEN SATURATION: 100 % | SYSTOLIC BLOOD PRESSURE: 154 MMHG | DIASTOLIC BLOOD PRESSURE: 63 MMHG | TEMPERATURE: 97.1 F | HEART RATE: 52 BPM

## 2023-08-02 DIAGNOSIS — C90.00 MULTIPLE MYELOMA NOT HAVING ACHIEVED REMISSION (HCC): Primary | ICD-10-CM

## 2023-08-02 LAB
ALBUMIN SERPL-MCNC: 3.8 GM/DL (ref 3.4–5)
ALP BLD-CCNC: 88 IU/L (ref 40–128)
ALT SERPL-CCNC: 19 U/L (ref 10–40)
ANION GAP SERPL CALCULATED.3IONS-SCNC: 10 MMOL/L (ref 4–16)
AST SERPL-CCNC: 32 IU/L (ref 15–37)
BILIRUB SERPL-MCNC: 0.4 MG/DL (ref 0–1)
BUN SERPL-MCNC: 16 MG/DL (ref 6–23)
CALCIUM SERPL-MCNC: 9 MG/DL (ref 8.3–10.6)
CHLORIDE BLD-SCNC: 106 MMOL/L (ref 99–110)
CO2: 24 MMOL/L (ref 21–32)
CREAT SERPL-MCNC: 0.8 MG/DL (ref 0.6–1.1)
EGFR, POC: >60 ML/MIN/1.73M2
GFR SERPL CREATININE-BSD FRML MDRD: >60 ML/MIN/1.73M2
GLUCOSE BLD-MCNC: 125 MG/DL (ref 70–99)
GLUCOSE SERPL-MCNC: 128 MG/DL (ref 70–99)
MAGNESIUM: 1.9 MG/DL (ref 1.8–2.4)
PHOSPHORUS: 3.9 MG/DL (ref 2.5–4.9)
POC BUN: 17 MG/DL (ref 8–26)
POC CALCIUM: 1.2 MMOL/L (ref 1.12–1.32)
POC CHLORIDE: 109 MMOL/L (ref 98–109)
POC CO2: 26 MMOL/L (ref 21–32)
POC CREATININE: 0.8 MG/DL (ref 0.6–1.1)
POTASSIUM SERPL-SCNC: 4.1 MMOL/L (ref 3.5–4.5)
POTASSIUM SERPL-SCNC: 4.9 MMOL/L (ref 3.5–5.1)
SODIUM BLD-SCNC: 140 MMOL/L (ref 135–145)
SODIUM BLD-SCNC: 140 MMOL/L (ref 138–146)
SOURCE, BLOOD GAS: ABNORMAL
TOTAL PROTEIN: 6.4 GM/DL (ref 6.4–8.2)

## 2023-08-02 PROCEDURE — 80053 COMPREHEN METABOLIC PANEL: CPT

## 2023-08-02 PROCEDURE — 1036F TOBACCO NON-USER: CPT | Performed by: INTERNAL MEDICINE

## 2023-08-02 PROCEDURE — 96367 TX/PROPH/DG ADDL SEQ IV INF: CPT

## 2023-08-02 PROCEDURE — 1090F PRES/ABSN URINE INCON ASSESS: CPT | Performed by: INTERNAL MEDICINE

## 2023-08-02 PROCEDURE — G8427 DOCREV CUR MEDS BY ELIG CLIN: HCPCS | Performed by: INTERNAL MEDICINE

## 2023-08-02 PROCEDURE — G8400 PT W/DXA NO RESULTS DOC: HCPCS | Performed by: INTERNAL MEDICINE

## 2023-08-02 PROCEDURE — 1123F ACP DISCUSS/DSCN MKR DOCD: CPT | Performed by: INTERNAL MEDICINE

## 2023-08-02 PROCEDURE — 36415 COLL VENOUS BLD VENIPUNCTURE: CPT

## 2023-08-02 PROCEDURE — 83735 ASSAY OF MAGNESIUM: CPT

## 2023-08-02 PROCEDURE — 99214 OFFICE O/P EST MOD 30 MIN: CPT | Performed by: INTERNAL MEDICINE

## 2023-08-02 PROCEDURE — G8417 CALC BMI ABV UP PARAM F/U: HCPCS | Performed by: INTERNAL MEDICINE

## 2023-08-02 PROCEDURE — 6360000002 HC RX W HCPCS: Performed by: INTERNAL MEDICINE

## 2023-08-02 PROCEDURE — 96365 THER/PROPH/DIAG IV INF INIT: CPT

## 2023-08-02 PROCEDURE — 3017F COLORECTAL CA SCREEN DOC REV: CPT | Performed by: INTERNAL MEDICINE

## 2023-08-02 PROCEDURE — 84100 ASSAY OF PHOSPHORUS: CPT

## 2023-08-02 PROCEDURE — 2580000003 HC RX 258: Performed by: INTERNAL MEDICINE

## 2023-08-02 RX ORDER — SODIUM CHLORIDE 9 MG/ML
5-250 INJECTION, SOLUTION INTRAVENOUS PRN
Status: DISCONTINUED | OUTPATIENT
Start: 2023-08-02 | End: 2023-08-03 | Stop reason: HOSPADM

## 2023-08-02 RX ADMIN — ZOLEDRONIC ACID 4 MG: 4 INJECTION, SOLUTION, CONCENTRATE INTRAVENOUS at 11:50

## 2023-08-02 RX ADMIN — SODIUM CHLORIDE 20 ML/HR: 9 INJECTION, SOLUTION INTRAVENOUS at 11:50

## 2023-08-02 ASSESSMENT — PATIENT HEALTH QUESTIONNAIRE - PHQ9
SUM OF ALL RESPONSES TO PHQ9 QUESTIONS 1 & 2: 0
SUM OF ALL RESPONSES TO PHQ QUESTIONS 1-9: 0
1. LITTLE INTEREST OR PLEASURE IN DOING THINGS: 0
2. FEELING DOWN, DEPRESSED OR HOPELESS: 0
SUM OF ALL RESPONSES TO PHQ QUESTIONS 1-9: 0

## 2023-08-02 NOTE — PROGRESS NOTES
MA Rooming Questions  Patient: Dorothea Michele  MRN: [id number]    Date: 8/2/2023        1. Do you have any new issues?   no         2. Do you need any refills on medications?    no    3. Have you had any imaging done since your last visit?   no    4. Have you been hospitalized or seen in the emergency room since your last visit here?   no    5. Did the patient have a depression screening completed today?  Yes    PHQ-9 Total Score: 0 (8/2/2023 10:32 AM)       PHQ-9 Given to (if applicable):               PHQ-9 Score (if applicable):                     [] Positive     [x]  Negative              Does question #9 need addressed (if applicable)                     [] Yes    []  No               Nancy Greene CMA

## 2023-08-02 NOTE — PROGRESS NOTES
Pt here for Zometa infusion after OV. PIV placed with blood return noted. CMP POC-T sent. Pt has no concerns at this time.      Infusion completed pt tolerated without incident left ambulatory discharge instructions given

## 2023-08-02 NOTE — PROGRESS NOTES
Patient Name: Renetta Antonio  Patient : 1950  Patient MRN: [id number]     Primary Oncologist: Artemio Goldberg MD  Referring Provider: Cj Cote MD     Date of Service: 2023      Chief Complaint:   Chief Complaint   Patient presents with    Follow-up    Chemotherapy     Patient Active Problem List:     Multiple myeloma not having achieved remission     HPI:   Ms. Seven Dorantes is a 80-year-old very pleasant patient with a medical history significant for stage II cervical cancer diagnosed in , status post laser surgery, gastroesophageal reflux disease, initially referred to me on 2014 for evaluation of right tibial intramedullary lesion. She stated that she has been having right leg pain since 2014, which has gradually been getting worse over time. She was seen by her primary care physician and had x-ray on 2014. It showed abnormal lucency within the tibial shaft at the junction of the mid and proximal third. MRI of the right lower extremity was done on 2014, and it showed marrow replacing 5.8 cm intramedullary lesion in the right mid tibial shaft with cortical breakthrough and adjacent periostitis. This corresponds to the lytic/lucent lesion on the recent x-ray. Differential considerations include lytic metastatic disease or multiple myeloma. She was subsequently referred to me for evaluation. She had upper sternum tumor for the last four years, which is about 8 by 10 cm in diameter. She otherwise does not have any other significant symptoms. 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 It is too small to measure the quantity. Her ESR was mildly elevated to 49.       Laboratory results done on September 3, 2014, showed normal CBC, mild elevation in serum creatinine

## 2023-08-06 DIAGNOSIS — C90.00 MULTIPLE MYELOMA NOT HAVING ACHIEVED REMISSION (HCC): ICD-10-CM

## 2023-08-07 RX ORDER — LENALIDOMIDE 10 MG/1
CAPSULE ORAL
Qty: 28 CAPSULE | Refills: 0 | Status: ACTIVE | OUTPATIENT
Start: 2023-08-07 | End: 2023-09-11 | Stop reason: SDUPTHER

## 2023-08-08 ENCOUNTER — TELEPHONE (OUTPATIENT)
Dept: CARDIOLOGY CLINIC | Age: 73
End: 2023-08-08

## 2023-08-11 NOTE — PROGRESS NOTES
Pt. Here for injection. Pt. Continues to have swelling in lower legs, stated the water pill Dr. Yamel Ching ordered is not helping. Spoke with Dr Yamel Ching and he stated that she could increase water pill for a couple days and to decrease steriod to 3 tablets weekly. Pt. Verbalized understanding. Injection given, pt. Tolerated well. Patient's status assessed and documented appropriately. All labs and required results were also reviewed today. Treatment parameters have been reviewed. Today's treatment has been approved by the provider. Treatment orders and medication sequencing (when applicable) was verified by 2 registered nurses. The treatment plan was confirmed with the patient prior to administration, and the patient understands the need to report any treatment-related symptoms. Prior to administration, when applicable, the following 8 elements of medication administration were reviewed with 2nd Registered Nurse prior to dosing: drug name, drug dose, infusion volume when prepared in a syringe, rate of administration, expiration dates and/or times, appearance and integrity of drug(s), and rate of pump for infusion. The 5 rights of medication administration have been verified. Initial (On Arrival)

## 2023-08-24 DIAGNOSIS — C90.00 MULTIPLE MYELOMA NOT HAVING ACHIEVED REMISSION (HCC): Primary | ICD-10-CM

## 2023-08-24 RX ORDER — DIPHENOXYLATE HYDROCHLORIDE AND ATROPINE SULFATE 2.5; .025 MG/1; MG/1
2 TABLET ORAL 4 TIMES DAILY PRN
Qty: 120 TABLET | Refills: 0 | Status: SHIPPED | OUTPATIENT
Start: 2023-08-24 | End: 2023-09-08

## 2023-09-11 DIAGNOSIS — C90.00 MULTIPLE MYELOMA NOT HAVING ACHIEVED REMISSION (HCC): ICD-10-CM

## 2023-09-11 RX ORDER — LENALIDOMIDE 10 MG/1
10 CAPSULE ORAL DAILY
Qty: 28 CAPSULE | Refills: 0 | Status: ACTIVE | OUTPATIENT
Start: 2023-09-11

## 2023-09-11 NOTE — PROGRESS NOTES
Revlimid 10 mg chemo capsules reordered and e-scribed to [facility]. Prescriber survey completed and new auth # [id number] obtained through [employer] portal. Auth valid for 30 days and attached to RX.

## 2023-09-15 DIAGNOSIS — C90.00 MULTIPLE MYELOMA NOT HAVING ACHIEVED REMISSION (HCC): ICD-10-CM

## 2023-09-15 RX ORDER — METOPROLOL SUCCINATE 25 MG/1
25 TABLET, EXTENDED RELEASE ORAL DAILY
Qty: 90 TABLET | Refills: 0 | Status: SHIPPED | OUTPATIENT
Start: 2023-09-15

## 2023-09-15 NOTE — PROGRESS NOTES
Received VM from patient requesting refill on BP medication. RX for metoprolol 25 mg daily e-scribed to [facility] on [address] per physician order.

## 2023-09-20 ENCOUNTER — TELEPHONE (OUTPATIENT)
Dept: CARDIOLOGY CLINIC | Age: 73
End: 2023-09-20

## 2023-09-20 ENCOUNTER — INITIAL CONSULT (OUTPATIENT)
Dept: CARDIOLOGY CLINIC | Age: 73
End: 2023-09-20

## 2023-09-20 VITALS
SYSTOLIC BLOOD PRESSURE: 132 MMHG | HEART RATE: 56 BPM | DIASTOLIC BLOOD PRESSURE: 60 MMHG | BODY MASS INDEX: 28.41 KG/M2 | HEIGHT: 67 IN | WEIGHT: 181 LBS

## 2023-09-20 DIAGNOSIS — Z71.9 ENCOUNTER FOR CONSULTATION: ICD-10-CM

## 2023-09-20 DIAGNOSIS — I34.0 MITRAL VALVE INSUFFICIENCY, UNSPECIFIED ETIOLOGY: Primary | ICD-10-CM

## 2023-09-20 DIAGNOSIS — M79.89 LEG SWELLING: ICD-10-CM

## 2023-09-20 NOTE — TELEPHONE ENCOUNTER
Patient called and canceled nm and vascular stating she would call back if she decided to do them later.

## 2023-09-23 RX ORDER — AMOXICILLIN AND CLAVULANATE POTASSIUM 875; 125 MG/1; MG/1
1 TABLET, FILM COATED ORAL 2 TIMES DAILY
Qty: 20 TABLET | Refills: 0 | Status: SHIPPED | OUTPATIENT
Start: 2023-09-23 | End: 2023-10-03

## 2023-10-06 DIAGNOSIS — C90.00 MULTIPLE MYELOMA NOT HAVING ACHIEVED REMISSION (HCC): ICD-10-CM

## 2023-10-06 RX ORDER — LENALIDOMIDE 10 MG/1
10 CAPSULE ORAL DAILY
Qty: 28 CAPSULE | Refills: 0 | Status: SHIPPED | OUTPATIENT
Start: 2023-10-06

## 2023-10-06 NOTE — PROGRESS NOTES
Received VM from patient stating she is on last week of 28 day supply and needs refill on oral chemo. Patient hoping to receive medication by 10/11/2023 since she is going out of town on 10/12/2023. Revlimid 10 mg chemo capsules reordered and sent to [facility]. Prescriber survey completed and new auth # [id number] obtained through [employer] portal. Auth valid for 30 days and attached to RX.

## 2023-10-25 ENCOUNTER — HOSPITAL ENCOUNTER (OUTPATIENT)
Dept: INFUSION THERAPY | Age: 73
Discharge: HOME | End: 2023-10-25
Payer: MEDICARE

## 2023-10-25 DIAGNOSIS — C90.00 MULTIPLE MYELOMA NOT HAVING ACHIEVED REMISSION (HCC): ICD-10-CM

## 2023-10-25 LAB
ALBUMIN SERPL-MCNC: 3.4 GM/DL (ref 3.4–5)
ALP BLD-CCNC: 100 IU/L (ref 40–129)
ALT SERPL-CCNC: 11 U/L (ref 10–40)
ANION GAP SERPL CALCULATED.3IONS-SCNC: 9 MMOL/L (ref 4–16)
AST SERPL-CCNC: 14 IU/L (ref 15–37)
BASOPHILS ABSOLUTE: 0.1 K/CU MM
BASOPHILS RELATIVE PERCENT: 3.5 % (ref 0–1)
BILIRUB SERPL-MCNC: 0.4 MG/DL (ref 0–1)
BUN SERPL-MCNC: 15 MG/DL (ref 6–23)
CALCIUM SERPL-MCNC: 8.5 MG/DL (ref 8.3–10.6)
CHLORIDE BLD-SCNC: 106 MMOL/L (ref 99–110)
CO2: 26 MMOL/L (ref 21–32)
CREAT SERPL-MCNC: 0.9 MG/DL (ref 0.6–1.1)
DIFFERENTIAL TYPE: ABNORMAL
EOSINOPHILS ABSOLUTE: 0.4 K/CU MM
EOSINOPHILS RELATIVE PERCENT: 15.4 % (ref 0–3)
ERYTHROCYTE SEDIMENTATION RATE: 48 MM/HR (ref 0–30)
GFR SERPL CREATININE-BSD FRML MDRD: >60 ML/MIN/1.73M2
GLUCOSE SERPL-MCNC: 91 MG/DL (ref 70–99)
HCT VFR BLD CALC: 22.3 % (ref 37–47)
HEMOGLOBIN: 7.4 GM/DL (ref 12.5–16)
IGA: 196 MG/DL (ref 69–382)
IGG,SERUM: 645 MG/DL (ref 723–1685)
IGM,SERUM: <25 MG/DL (ref 62–277)
LACTATE DEHYDROGENASE: 190 IU/L (ref 120–246)
LYMPHOCYTES ABSOLUTE: 0.7 K/CU MM
LYMPHOCYTES RELATIVE PERCENT: 29.8 % (ref 24–44)
MCH RBC QN AUTO: 32.9 PG (ref 27–31)
MCHC RBC AUTO-ENTMCNC: 33.2 % (ref 32–36)
MCV RBC AUTO: 99.1 FL (ref 78–100)
MONOCYTES ABSOLUTE: 0.4 K/CU MM
MONOCYTES RELATIVE PERCENT: 15.4 % (ref 0–4)
PDW BLD-RTO: 22.5 % (ref 11.7–14.9)
PLATELET # BLD: ADEQUATE K/CU MM (ref 140–440)
PMV BLD AUTO: ABNORMAL FL (ref 7.5–11.1)
POTASSIUM SERPL-SCNC: 4.3 MMOL/L (ref 3.5–5.1)
RBC # BLD: 2.25 M/CU MM (ref 4.2–5.4)
SEGMENTED NEUTROPHILS ABSOLUTE COUNT: 0.8 K/CU MM
SEGMENTED NEUTROPHILS RELATIVE PERCENT: 35.9 % (ref 36–66)
SODIUM BLD-SCNC: 141 MMOL/L (ref 135–145)
TOTAL PROTEIN: 5.4 GM/DL (ref 6.4–8.2)
TOTAL PROTEIN: 5.4 GM/DL (ref 6.4–8.2)
WBC # BLD: 2.3 K/CU MM (ref 4–10.5)

## 2023-10-25 PROCEDURE — 84155 ASSAY OF PROTEIN SERUM: CPT

## 2023-10-25 PROCEDURE — 82784 ASSAY IGA/IGD/IGG/IGM EACH: CPT

## 2023-10-25 PROCEDURE — 82232 ASSAY OF BETA-2 PROTEIN: CPT

## 2023-10-25 PROCEDURE — 85025 COMPLETE CBC W/AUTO DIFF WBC: CPT

## 2023-10-25 PROCEDURE — 84165 PROTEIN E-PHORESIS SERUM: CPT

## 2023-10-25 PROCEDURE — 83615 LACTATE (LD) (LDH) ENZYME: CPT

## 2023-10-25 PROCEDURE — 86320 SERUM IMMUNOELECTROPHORESIS: CPT

## 2023-10-25 PROCEDURE — 85652 RBC SED RATE AUTOMATED: CPT

## 2023-10-25 PROCEDURE — 36415 COLL VENOUS BLD VENIPUNCTURE: CPT

## 2023-10-25 PROCEDURE — 80053 COMPREHEN METABOLIC PANEL: CPT

## 2023-10-25 PROCEDURE — 83883 ASSAY NEPHELOMETRY NOT SPEC: CPT

## 2023-10-26 LAB
KAPPA LC FREE SER-MCNC: 36.96 MG/L (ref 3.3–19.4)
KAPPA LC FREE/LAMBDA FREE SER NEPH: 1.19 {RATIO} (ref 0.26–1.65)
LAMBDA LC FREE SERPL-MCNC: 31.08 MG/L (ref 5.71–26.3)

## 2023-10-27 LAB — B2 MICROGLOB SERPL-MCNC: 3.7 MG/L

## 2023-10-30 LAB
ALBUMIN SERPL ELPH-MCNC: 3.2 GM/DL (ref 3.2–5.6)
ALPHA-1-GLOBULIN: 0.3 GM/DL (ref 0.1–0.4)
ALPHA-2-GLOBULIN: 0.7 GM/DL (ref 0.4–1.2)
BETA GLOBULIN: 1 GM/DL (ref 0.5–1.3)
GAMMA GLOBULIN: 0.6 GM/DL (ref 0.5–1.6)
SPEP INTERPRETATION: ABNORMAL
SPEP INTERPRETATION: NORMAL
TOTAL PROTEIN: 5.4 GM/DL (ref 6.4–8.2)

## 2023-11-01 ENCOUNTER — HOSPITAL ENCOUNTER (OUTPATIENT)
Dept: INFUSION THERAPY | Age: 73
Discharge: HOME | End: 2023-11-01
Payer: MEDICARE

## 2023-11-01 ENCOUNTER — OFFICE VISIT (OUTPATIENT)
Dept: ONCOLOGY | Age: 73
End: 2023-11-01
Payer: MEDICARE

## 2023-11-01 VITALS
HEART RATE: 56 BPM | SYSTOLIC BLOOD PRESSURE: 122 MMHG | WEIGHT: 184.8 LBS | OXYGEN SATURATION: 99 % | BODY MASS INDEX: 29 KG/M2 | DIASTOLIC BLOOD PRESSURE: 56 MMHG | HEIGHT: 67 IN | TEMPERATURE: 97.5 F

## 2023-11-01 DIAGNOSIS — C90.00 MULTIPLE MYELOMA NOT HAVING ACHIEVED REMISSION (HCC): Primary | ICD-10-CM

## 2023-11-01 DIAGNOSIS — R53.82 CHRONIC FATIGUE: ICD-10-CM

## 2023-11-01 DIAGNOSIS — C90.00 MULTIPLE MYELOMA NOT HAVING ACHIEVED REMISSION (HCC): ICD-10-CM

## 2023-11-01 LAB
BASOPHILS ABSOLUTE: 0.1 K/CU MM
BASOPHILS RELATIVE PERCENT: 2.9 % (ref 0–1)
DIFFERENTIAL TYPE: ABNORMAL
EOSINOPHILS ABSOLUTE: 0.4 K/CU MM
EOSINOPHILS RELATIVE PERCENT: 15.7 % (ref 0–3)
ERYTHROCYTE SEDIMENTATION RATE: 26 MM/HR (ref 0–30)
FERRITIN: 181 NG/ML (ref 15–150)
FOLATE SERPL-MCNC: >20 NG/ML (ref 3.1–17.5)
HCT VFR BLD CALC: 24 % (ref 37–47)
HEMOGLOBIN: 7.2 GM/DL (ref 12.5–16)
IRON: 35 UG/DL (ref 37–145)
LACTATE DEHYDROGENASE: 202 IU/L (ref 120–246)
LYMPHOCYTES ABSOLUTE: 0.8 K/CU MM
LYMPHOCYTES RELATIVE PERCENT: 31.8 % (ref 24–44)
MCH RBC QN AUTO: 29.6 PG (ref 27–31)
MCHC RBC AUTO-ENTMCNC: 30 % (ref 32–36)
MCV RBC AUTO: 98.8 FL (ref 78–100)
MONOCYTES ABSOLUTE: 0.4 K/CU MM
MONOCYTES RELATIVE PERCENT: 16.9 % (ref 0–4)
PCT TRANSFERRIN: 13 % (ref 10–44)
PDW BLD-RTO: 18.7 % (ref 11.7–14.9)
PLATELET # BLD: 139 K/CU MM (ref 140–440)
PMV BLD AUTO: 10.8 FL (ref 7.5–11.1)
RBC # BLD: 2.43 M/CU MM (ref 4.2–5.4)
RETICULOCYTE COUNT PCT: 2.2 % (ref 0.2–2.2)
SEGMENTED NEUTROPHILS ABSOLUTE COUNT: 0.8 K/CU MM
SEGMENTED NEUTROPHILS RELATIVE PERCENT: 32.7 % (ref 36–66)
TOTAL IRON BINDING CAPACITY: 279 UG/DL (ref 250–450)
TSH SERPL DL<=0.005 MIU/L-ACNC: 1.58 UIU/ML (ref 0.27–4.2)
UNSATURATED IRON BINDING CAPACITY: 244 UG/DL (ref 110–370)
VITAMIN B-12: >2000 PG/ML (ref 211–911)
WBC # BLD: 2.4 K/CU MM (ref 4–10.5)

## 2023-11-01 PROCEDURE — 83550 IRON BINDING TEST: CPT

## 2023-11-01 PROCEDURE — 3017F COLORECTAL CA SCREEN DOC REV: CPT | Performed by: INTERNAL MEDICINE

## 2023-11-01 PROCEDURE — 85652 RBC SED RATE AUTOMATED: CPT

## 2023-11-01 PROCEDURE — 82607 VITAMIN B-12: CPT

## 2023-11-01 PROCEDURE — 82728 ASSAY OF FERRITIN: CPT

## 2023-11-01 PROCEDURE — G8484 FLU IMMUNIZE NO ADMIN: HCPCS | Performed by: INTERNAL MEDICINE

## 2023-11-01 PROCEDURE — 36415 COLL VENOUS BLD VENIPUNCTURE: CPT

## 2023-11-01 PROCEDURE — G8417 CALC BMI ABV UP PARAM F/U: HCPCS | Performed by: INTERNAL MEDICINE

## 2023-11-01 PROCEDURE — 1090F PRES/ABSN URINE INCON ASSESS: CPT | Performed by: INTERNAL MEDICINE

## 2023-11-01 PROCEDURE — G8427 DOCREV CUR MEDS BY ELIG CLIN: HCPCS | Performed by: INTERNAL MEDICINE

## 2023-11-01 PROCEDURE — 84443 ASSAY THYROID STIM HORMONE: CPT

## 2023-11-01 PROCEDURE — 85045 AUTOMATED RETICULOCYTE COUNT: CPT

## 2023-11-01 PROCEDURE — 83615 LACTATE (LD) (LDH) ENZYME: CPT

## 2023-11-01 PROCEDURE — 85025 COMPLETE CBC W/AUTO DIFF WBC: CPT

## 2023-11-01 PROCEDURE — 83540 ASSAY OF IRON: CPT

## 2023-11-01 PROCEDURE — G8400 PT W/DXA NO RESULTS DOC: HCPCS | Performed by: INTERNAL MEDICINE

## 2023-11-01 PROCEDURE — 1123F ACP DISCUSS/DSCN MKR DOCD: CPT | Performed by: INTERNAL MEDICINE

## 2023-11-01 PROCEDURE — 82746 ASSAY OF FOLIC ACID SERUM: CPT

## 2023-11-01 PROCEDURE — 99214 OFFICE O/P EST MOD 30 MIN: CPT | Performed by: INTERNAL MEDICINE

## 2023-11-01 PROCEDURE — 83010 ASSAY OF HAPTOGLOBIN QUANT: CPT

## 2023-11-01 PROCEDURE — 99211 OFF/OP EST MAY X REQ PHY/QHP: CPT

## 2023-11-01 PROCEDURE — 1036F TOBACCO NON-USER: CPT | Performed by: INTERNAL MEDICINE

## 2023-11-02 LAB — HAPTOGLOB SERPL-MCNC: 176 MG/DL (ref 30–200)

## 2023-11-03 DIAGNOSIS — C90.00 MULTIPLE MYELOMA NOT HAVING ACHIEVED REMISSION (HCC): ICD-10-CM

## 2023-11-03 RX ORDER — LENALIDOMIDE 10 MG/1
10 CAPSULE ORAL DAILY
Qty: 28 CAPSULE | Refills: 0 | Status: ACTIVE | OUTPATIENT
Start: 2023-11-03

## 2023-11-03 NOTE — PROGRESS NOTES
Revlimid 10 mg chemo capsules reordered and sent to [facility]. Prescriber survey completed and new auth # [id number] obtained through [employer] portal. Auth valid for 30 days and attached to RX.

## 2023-11-22 ENCOUNTER — HOSPITAL ENCOUNTER (OUTPATIENT)
Dept: INFUSION THERAPY | Age: 73
Discharge: HOME | End: 2023-11-22
Payer: MEDICARE

## 2023-11-22 ENCOUNTER — OFFICE VISIT (OUTPATIENT)
Dept: ONCOLOGY | Age: 73
End: 2023-11-22
Payer: MEDICARE

## 2023-11-22 VITALS
HEIGHT: 67 IN | DIASTOLIC BLOOD PRESSURE: 84 MMHG | SYSTOLIC BLOOD PRESSURE: 185 MMHG | OXYGEN SATURATION: 97 % | TEMPERATURE: 97.4 F | HEART RATE: 67 BPM | WEIGHT: 182.8 LBS | BODY MASS INDEX: 28.69 KG/M2

## 2023-11-22 DIAGNOSIS — C90.00 MULTIPLE MYELOMA NOT HAVING ACHIEVED REMISSION (HCC): Primary | ICD-10-CM

## 2023-11-22 DIAGNOSIS — C90.00 MULTIPLE MYELOMA NOT HAVING ACHIEVED REMISSION (HCC): ICD-10-CM

## 2023-11-22 LAB
BASOPHILS ABSOLUTE: 0 K/CU MM
BASOPHILS RELATIVE PERCENT: 0.3 % (ref 0–1)
DIFFERENTIAL TYPE: ABNORMAL
EOSINOPHILS ABSOLUTE: 0.1 K/CU MM
EOSINOPHILS RELATIVE PERCENT: 3.3 % (ref 0–3)
HCT VFR BLD CALC: 27.9 % (ref 37–47)
HEMOGLOBIN: 8.4 GM/DL (ref 12.5–16)
LYMPHOCYTES ABSOLUTE: 0.8 K/CU MM
LYMPHOCYTES RELATIVE PERCENT: 22.7 % (ref 24–44)
MCH RBC QN AUTO: 29.4 PG (ref 27–31)
MCHC RBC AUTO-ENTMCNC: 30.1 % (ref 32–36)
MCV RBC AUTO: 97.6 FL (ref 78–100)
MONOCYTES ABSOLUTE: 0.5 K/CU MM
MONOCYTES RELATIVE PERCENT: 13.6 % (ref 0–4)
PDW BLD-RTO: 17.7 % (ref 11.7–14.9)
PLATELET # BLD: 220 K/CU MM (ref 140–440)
PMV BLD AUTO: 9 FL (ref 7.5–11.1)
RBC # BLD: 2.86 M/CU MM (ref 4.2–5.4)
SEGMENTED NEUTROPHILS ABSOLUTE COUNT: 2 K/CU MM
SEGMENTED NEUTROPHILS RELATIVE PERCENT: 60.1 % (ref 36–66)
WBC # BLD: 3.3 K/CU MM (ref 4–10.5)

## 2023-11-22 PROCEDURE — 36415 COLL VENOUS BLD VENIPUNCTURE: CPT

## 2023-11-22 PROCEDURE — 85025 COMPLETE CBC W/AUTO DIFF WBC: CPT

## 2023-11-22 PROCEDURE — G8427 DOCREV CUR MEDS BY ELIG CLIN: HCPCS | Performed by: INTERNAL MEDICINE

## 2023-11-22 PROCEDURE — G8417 CALC BMI ABV UP PARAM F/U: HCPCS | Performed by: INTERNAL MEDICINE

## 2023-11-22 PROCEDURE — 99214 OFFICE O/P EST MOD 30 MIN: CPT | Performed by: INTERNAL MEDICINE

## 2023-11-22 PROCEDURE — 99211 OFF/OP EST MAY X REQ PHY/QHP: CPT

## 2023-11-22 PROCEDURE — G8400 PT W/DXA NO RESULTS DOC: HCPCS | Performed by: INTERNAL MEDICINE

## 2023-11-22 PROCEDURE — 1036F TOBACCO NON-USER: CPT | Performed by: INTERNAL MEDICINE

## 2023-11-22 PROCEDURE — 1090F PRES/ABSN URINE INCON ASSESS: CPT | Performed by: INTERNAL MEDICINE

## 2023-11-22 PROCEDURE — G8484 FLU IMMUNIZE NO ADMIN: HCPCS | Performed by: INTERNAL MEDICINE

## 2023-11-22 PROCEDURE — 1123F ACP DISCUSS/DSCN MKR DOCD: CPT | Performed by: INTERNAL MEDICINE

## 2023-11-22 PROCEDURE — 3017F COLORECTAL CA SCREEN DOC REV: CPT | Performed by: INTERNAL MEDICINE

## 2023-11-22 NOTE — PROGRESS NOTES
MA Rooming Questions  Patient: Justa Painter  MRN: [id number]    Date: 11/22/2023        1. Do you have any new issues?   no         2. Do you need any refills on medications?    no    3. Have you had any imaging done since your last visit?   no    4. Have you been hospitalized or seen in the emergency room since your last visit here?   no    5. Did the patient have a depression screening completed today?  No    No data recorded     PHQ-9 Given to (if applicable):               PHQ-9 Score (if applicable):                     [] Positive     []  Negative              Does question #9 need addressed (if applicable)                     [] Yes    []  No               Leela Yancey MA
1.580 11/01/2023     Immunology:  Lab Results   Component Value Date    PROT 5.4 (L) 10/25/2023    PROT 5.4 (L) 10/25/2023    SPEP  10/25/2023     INTERPRETATION - Decrease in total protein. Nell Valencia MD    SPEP  10/25/2023     INTERPRETATION - Monoclonal gammopathy is not identified. Nell Valencia MD    ALBUMINELP 3.2 10/25/2023    LABALPH 0.3 10/25/2023    LABALPH 0.7 10/25/2023    GAMGLOB 0.6 10/25/2023     Lab Results   Component Value Date    KAPPAUVOL 36.96 (H) 10/25/2023    LAMBDAUVOL 67.21 (H) 11/11/2020    KLFLCR 1.19 10/25/2023     Lab Results   Component Value Date    B2M 3.7 (H) 10/25/2023     Coagulation Panel:  Lab Results   Component Value Date    PROTIME 12.8 (H) 10/31/2018    INR 1.12 10/31/2018    APTT 29.7 10/31/2018    DDIMER <200 05/17/2017     Anemia Panel:  Lab Results   Component Value Date    JLQEYIAA07 >2000 (H) 11/01/2023    FOLATE >20.0 (H) 11/01/2023     Tumor Markers:  No results found for: \"\", \"CEA\", \"\", \"LABCA2\", \"PSA\"  Observations:  No data recorded      Assessment:   Right tibial intramedullary lesion and upper sternum lesion - with multiple lytic bone lesions - biopsy is consistent with multiple myeloma - s/p chemotherapy with Velcade, Revlimid and Dexamentasone - currently on maintenance Melphalan and prednisone. Plan:  Ms. Radha Garcia has been followed for multiple myeloma. Since she is found to have recurrent multiple myeloma, we started chemotherapy with Velcade, Revlimid and dexamethasone since July 7, 2021. Zoledronic acid was started on July 7, 2021 and we will continue to give it every 12 week intervals. On November 22, 2023, she presented to me for followup. I have been following Ms. Bar for multiple myeloma. She was on maintenance chemotherapy until May 7, 2017, and we stopped it after that since she could not tolerate maintenance chemotherapy because of significant myelosuppression. She was placed on under close observation.       She noticed

## 2024-01-16 DIAGNOSIS — C90.00 MULTIPLE MYELOMA NOT HAVING ACHIEVED REMISSION (HCC): ICD-10-CM

## 2024-01-16 RX ORDER — LENALIDOMIDE 10 MG/1
10 CAPSULE ORAL DAILY
Qty: 28 CAPSULE | Refills: 0 | Status: ACTIVE | OUTPATIENT
Start: 2024-01-16 | End: 2024-01-18 | Stop reason: SDUPTHER

## 2024-01-16 NOTE — PROGRESS NOTES
Received VM from patient requesting refill on oral chemo. Revlimid 10 mg chemo capsules reordered and sent to [facility] pharmacy. Prescriber survey completed and new auth # [id number] obtained through [employer] portal. Auth valid for 30 days and attached to RX.

## 2024-01-18 DIAGNOSIS — C90.00 MULTIPLE MYELOMA NOT HAVING ACHIEVED REMISSION (HCC): ICD-10-CM

## 2024-01-18 RX ORDER — LENALIDOMIDE 10 MG/1
10 CAPSULE ORAL DAILY
Qty: 28 CAPSULE | Refills: 0 | Status: ACTIVE | OUTPATIENT
Start: 2024-01-18

## 2024-01-18 NOTE — PROGRESS NOTES
RX for revlimid resubmitted to [facility] with updated AUTH# [id number] since previous auth was still attached.

## 2024-01-24 ENCOUNTER — HOSPITAL ENCOUNTER (OUTPATIENT)
Dept: WOMENS IMAGING | Age: 74
Discharge: HOME | End: 2024-01-24
Payer: MEDICARE

## 2024-01-24 VITALS — WEIGHT: 174 LBS | BODY MASS INDEX: 27.97 KG/M2 | HEIGHT: 66 IN

## 2024-01-24 DIAGNOSIS — Z12.31 ENCOUNTER FOR SCREENING MAMMOGRAM FOR MALIGNANT NEOPLASM OF BREAST: ICD-10-CM

## 2024-01-24 PROCEDURE — 77063 BREAST TOMOSYNTHESIS BI: CPT

## 2024-01-25 ENCOUNTER — CLINICAL DOCUMENTATION (OUTPATIENT)
Dept: ONCOLOGY | Age: 74
End: 2024-01-25

## 2024-01-25 RX ORDER — AMOXICILLIN AND CLAVULANATE POTASSIUM 875; 125 MG/1; MG/1
1 TABLET, FILM COATED ORAL 2 TIMES DAILY
Qty: 20 TABLET | Refills: 0 | Status: SHIPPED | OUTPATIENT
Start: 2024-01-25 | End: 2024-02-04

## 2024-01-25 NOTE — PROGRESS NOTES
Received VM from patient c/o sinus infection that is getting worse. Patient has tried OTC medication ans inquiring about a prescription. Physician aware. RX for Augmentin BID x10 days e-scribed to [facility] pharmacy on [address] per physician order. Attempted to call patient back @ [phone] to update; however, no answer at this time. VM left with this RN direct number requesting call back to confirm message received.     Patient called back immediately. Voices understanding and agreeable to take PO ATB. No further needs addressed at this time.

## 2024-01-29 DIAGNOSIS — C90.00 MULTIPLE MYELOMA NOT HAVING ACHIEVED REMISSION (HCC): Primary | ICD-10-CM

## 2024-01-29 RX ORDER — MEPERIDINE HYDROCHLORIDE 25 MG/ML
12.5 INJECTION INTRAMUSCULAR; INTRAVENOUS; SUBCUTANEOUS PRN
Status: CANCELLED | OUTPATIENT
Start: 2024-01-31

## 2024-01-29 RX ORDER — ONDANSETRON 2 MG/ML
8 INJECTION INTRAMUSCULAR; INTRAVENOUS
Status: CANCELLED | OUTPATIENT
Start: 2024-01-31

## 2024-01-29 RX ORDER — SODIUM CHLORIDE 9 MG/ML
5-250 INJECTION, SOLUTION INTRAVENOUS PRN
Status: CANCELLED | OUTPATIENT
Start: 2024-01-31

## 2024-01-29 RX ORDER — EPINEPHRINE 1 MG/ML
0.3 INJECTION, SOLUTION, CONCENTRATE INTRAVENOUS PRN
Status: CANCELLED | OUTPATIENT
Start: 2024-01-31

## 2024-01-29 RX ORDER — SODIUM CHLORIDE 9 MG/ML
INJECTION, SOLUTION INTRAVENOUS CONTINUOUS
Status: CANCELLED | OUTPATIENT
Start: 2024-01-31

## 2024-01-29 RX ORDER — ZOLEDRONIC ACID 0.04 MG/ML
4 INJECTION, SOLUTION INTRAVENOUS ONCE
Status: CANCELLED | OUTPATIENT
Start: 2024-01-31 | End: 2024-01-31

## 2024-01-29 RX ORDER — SODIUM CHLORIDE 0.9 % (FLUSH) 0.9 %
5-40 SYRINGE (ML) INJECTION PRN
Status: CANCELLED | OUTPATIENT
Start: 2024-01-31

## 2024-01-29 RX ORDER — FAMOTIDINE 10 MG/ML
20 INJECTION, SOLUTION INTRAVENOUS
Status: CANCELLED | OUTPATIENT
Start: 2024-01-31

## 2024-01-29 RX ORDER — ALBUTEROL SULFATE 90 UG/1
4 AEROSOL, METERED RESPIRATORY (INHALATION) PRN
Status: CANCELLED | OUTPATIENT
Start: 2024-01-31

## 2024-01-29 RX ORDER — ACETAMINOPHEN 325 MG/1
650 TABLET ORAL
Status: CANCELLED | OUTPATIENT
Start: 2024-01-31

## 2024-01-29 RX ORDER — DIPHENHYDRAMINE HYDROCHLORIDE 50 MG/ML
50 INJECTION INTRAMUSCULAR; INTRAVENOUS
Status: CANCELLED | OUTPATIENT
Start: 2024-01-31

## 2024-01-29 RX ORDER — HEPARIN 100 UNIT/ML
500 SYRINGE INTRAVENOUS PRN
Status: CANCELLED | OUTPATIENT
Start: 2024-01-31

## 2024-01-31 ENCOUNTER — HOSPITAL ENCOUNTER (OUTPATIENT)
Dept: INFUSION THERAPY | Age: 74
Discharge: HOME | End: 2024-01-31
Payer: MEDICARE

## 2024-01-31 VITALS
WEIGHT: 170 LBS | SYSTOLIC BLOOD PRESSURE: 140 MMHG | DIASTOLIC BLOOD PRESSURE: 63 MMHG | TEMPERATURE: 97.4 F | HEART RATE: 46 BPM | BODY MASS INDEX: 27.32 KG/M2 | HEIGHT: 66 IN | OXYGEN SATURATION: 100 %

## 2024-01-31 DIAGNOSIS — C90.00 MULTIPLE MYELOMA NOT HAVING ACHIEVED REMISSION (HCC): Primary | ICD-10-CM

## 2024-01-31 LAB
ALBUMIN SERPL ELPH-MCNC: ABNORMAL GM/DL (ref 3.2–5.6)
ALBUMIN SERPL-MCNC: 3.6 GM/DL (ref 3.4–5)
ALP BLD-CCNC: 95 IU/L (ref 40–129)
ALPHA-1-GLOBULIN: ABNORMAL GM/DL (ref 0.1–0.4)
ALPHA-2-GLOBULIN: ABNORMAL GM/DL (ref 0.4–1.2)
ALT SERPL-CCNC: 14 U/L (ref 10–40)
ANION GAP SERPL CALCULATED.3IONS-SCNC: 12 MMOL/L (ref 7–16)
AST SERPL-CCNC: 32 IU/L (ref 15–37)
BASOPHILS ABSOLUTE: 0 K/CU MM
BASOPHILS RELATIVE PERCENT: 0.4 % (ref 0–1)
BETA GLOBULIN: ABNORMAL GM/DL (ref 0.5–1.3)
BILIRUB SERPL-MCNC: 0.3 MG/DL (ref 0–1)
BUN SERPL-MCNC: 19 MG/DL (ref 6–23)
CALCIUM SERPL-MCNC: 8.6 MG/DL (ref 8.3–10.6)
CHLORIDE BLD-SCNC: 107 MMOL/L (ref 99–110)
CO2: 21 MMOL/L (ref 21–32)
CREAT SERPL-MCNC: 0.8 MG/DL (ref 0.6–1.1)
DIFFERENTIAL TYPE: ABNORMAL
EGFR, POC: >60 ML/MIN/1.73M2
EOSINOPHILS ABSOLUTE: 0.2 K/CU MM
EOSINOPHILS RELATIVE PERCENT: 5.4 % (ref 0–3)
ERYTHROCYTE SEDIMENTATION RATE: 39 MM/HR (ref 0–30)
GAMMA GLOBULIN: ABNORMAL GM/DL (ref 0.5–1.6)
GFR SERPL CREATININE-BSD FRML MDRD: >60 ML/MIN/1.73M2
GLUCOSE BLD-MCNC: 83 MG/DL (ref 70–99)
GLUCOSE SERPL-MCNC: 84 MG/DL (ref 70–99)
HCT VFR BLD CALC: 29.3 % (ref 37–47)
HEMOGLOBIN: 9 GM/DL (ref 12.5–16)
IGA: 330 MG/DL (ref 69–382)
IGG,SERUM: 1106 MG/DL (ref 723–1685)
IGM,SERUM: 48 MG/DL (ref 62–277)
LACTATE DEHYDROGENASE: 390 IU/L (ref 120–246)
LYMPHOCYTES ABSOLUTE: 1 K/CU MM
LYMPHOCYTES RELATIVE PERCENT: 34.4 % (ref 24–44)
MAGNESIUM: 1.8 MG/DL (ref 1.8–2.4)
MCH RBC QN AUTO: 27.7 PG (ref 27–31)
MCHC RBC AUTO-ENTMCNC: 30.7 % (ref 32–36)
MCV RBC AUTO: 90.2 FL (ref 78–100)
MONOCYTES ABSOLUTE: 0.4 K/CU MM
MONOCYTES RELATIVE PERCENT: 13.6 % (ref 0–4)
PDW BLD-RTO: 18.8 % (ref 11.7–14.9)
PHOSPHORUS: 3.9 MG/DL (ref 2.5–4.9)
PLATELET # BLD: 216 K/CU MM (ref 140–440)
PMV BLD AUTO: 12.2 FL (ref 7.5–11.1)
POC BUN: 21 MG/DL (ref 6–23)
POC CALCIUM: 1.22 MMOL/L (ref 1.15–1.33)
POC CHLORIDE: 113 MMOL/L (ref 99–100)
POC CO2: 23 MMOL/L (ref 21–32)
POC CREATININE: 0.7 MG/DL (ref 0.5–1.2)
POTASSIUM SERPL-SCNC: 4.3 MMOL/L (ref 3.5–5.1)
POTASSIUM SERPL-SCNC: 4.8 MMOL/L (ref 3.5–5.1)
RBC # BLD: 3.25 M/CU MM (ref 4.2–5.4)
SEGMENTED NEUTROPHILS ABSOLUTE COUNT: 1.3 K/CU MM
SEGMENTED NEUTROPHILS RELATIVE PERCENT: 46.2 % (ref 36–66)
SODIUM BLD-SCNC: 140 MMOL/L (ref 135–145)
SODIUM BLD-SCNC: 141 MMOL/L (ref 135–145)
SOURCE, BLOOD GAS: ABNORMAL
SPEP INTERPRETATION: ABNORMAL
TOTAL PROTEIN: 6.3 GM/DL (ref 6.4–8.2)
TOTAL PROTEIN: 6.3 GM/DL (ref 6.4–8.2)
WBC # BLD: 2.8 K/CU MM (ref 4–10.5)

## 2024-01-31 PROCEDURE — 85652 RBC SED RATE AUTOMATED: CPT

## 2024-01-31 PROCEDURE — 6360000002 HC RX W HCPCS: Performed by: INTERNAL MEDICINE

## 2024-01-31 PROCEDURE — 96365 THER/PROPH/DIAG IV INF INIT: CPT

## 2024-01-31 PROCEDURE — 80053 COMPREHEN METABOLIC PANEL: CPT

## 2024-01-31 PROCEDURE — 84100 ASSAY OF PHOSPHORUS: CPT

## 2024-01-31 PROCEDURE — 84165 PROTEIN E-PHORESIS SERUM: CPT

## 2024-01-31 PROCEDURE — 83615 LACTATE (LD) (LDH) ENZYME: CPT

## 2024-01-31 PROCEDURE — 83735 ASSAY OF MAGNESIUM: CPT

## 2024-01-31 PROCEDURE — 84155 ASSAY OF PROTEIN SERUM: CPT

## 2024-01-31 PROCEDURE — 85025 COMPLETE CBC W/AUTO DIFF WBC: CPT

## 2024-01-31 PROCEDURE — 86320 SERUM IMMUNOELECTROPHORESIS: CPT

## 2024-01-31 PROCEDURE — 83883 ASSAY NEPHELOMETRY NOT SPEC: CPT

## 2024-01-31 PROCEDURE — 2580000003 HC RX 258: Performed by: INTERNAL MEDICINE

## 2024-01-31 PROCEDURE — 82784 ASSAY IGA/IGD/IGG/IGM EACH: CPT

## 2024-01-31 PROCEDURE — 82232 ASSAY OF BETA-2 PROTEIN: CPT

## 2024-01-31 RX ORDER — SODIUM CHLORIDE 9 MG/ML
5-250 INJECTION, SOLUTION INTRAVENOUS PRN
Status: DISCONTINUED | OUTPATIENT
Start: 2024-01-31 | End: 2024-02-01 | Stop reason: HOSPADM

## 2024-01-31 RX ADMIN — ZOLEDRONIC ACID 4 MG: 4 INJECTION, SOLUTION, CONCENTRATE INTRAVENOUS at 11:44

## 2024-01-31 RX ADMIN — SODIUM CHLORIDE 20 ML/HR: 9 INJECTION, SOLUTION INTRAVENOUS at 11:43

## 2024-01-31 NOTE — PROGRESS NOTES
Pt here for Zometa infusion. PIV placed with blood return noted. Labs drawn and sent. POC results reviewed and tx released. Pt has no concerns or issues to discuss at this time.     Infusion completed pt tolerated without incident left ambulatory discharge instructions given

## 2024-02-01 ENCOUNTER — TELEPHONE (OUTPATIENT)
Dept: ONCOLOGY | Age: 74
End: 2024-02-01

## 2024-02-01 LAB
REASON FOR REJECTION: NORMAL
REASON FOR REJECTION: NORMAL
REJECTED TEST: NORMAL
REJECTED TEST: NORMAL

## 2024-02-01 NOTE — TELEPHONE ENCOUNTER
2/1/24 - Left pt  to call the office [phone]. We need to do a redraw of the lab per Lito in the lab. Sometime this week or next week.

## 2024-02-02 LAB — B2 MICROGLOB SERPL-MCNC: 3.3 MG/L

## 2024-02-02 RX ORDER — FUROSEMIDE 20 MG/1
20 TABLET ORAL DAILY PRN
Qty: 30 TABLET | Refills: 0 | Status: SHIPPED | OUTPATIENT
Start: 2024-02-02

## 2024-02-02 NOTE — PROGRESS NOTES
Received VM from patient requesting refill on water pill. Patient states she takes PRN, but her previous RX  and she discarded medication. RX for lasix 20 mg PO daily PRN for leg swelling e-scribed to [facility] on [address].

## 2024-02-07 ENCOUNTER — HOSPITAL ENCOUNTER (OUTPATIENT)
Dept: INFUSION THERAPY | Age: 74
Discharge: HOME | End: 2024-02-07
Payer: MEDICARE

## 2024-02-07 DIAGNOSIS — C90.00 MULTIPLE MYELOMA NOT HAVING ACHIEVED REMISSION (HCC): ICD-10-CM

## 2024-02-07 LAB
ALBUMIN SERPL-MCNC: 3.5 GM/DL (ref 3.4–5)
ALP BLD-CCNC: 118 IU/L (ref 40–128)
ALT SERPL-CCNC: 14 U/L (ref 10–40)
ANION GAP SERPL CALCULATED.3IONS-SCNC: 6 MMOL/L (ref 7–16)
AST SERPL-CCNC: 17 IU/L (ref 15–37)
BILIRUB SERPL-MCNC: 0.2 MG/DL (ref 0–1)
BUN SERPL-MCNC: 15 MG/DL (ref 6–23)
CALCIUM SERPL-MCNC: 7.7 MG/DL (ref 8.3–10.6)
CHLORIDE BLD-SCNC: 108 MMOL/L (ref 99–110)
CO2: 27 MMOL/L (ref 21–32)
CREAT SERPL-MCNC: 0.7 MG/DL (ref 0.6–1.1)
GFR SERPL CREATININE-BSD FRML MDRD: >60 ML/MIN/1.73M2
GLUCOSE SERPL-MCNC: 82 MG/DL (ref 70–99)
KAPPA LC FREE SER-MCNC: 73.01 MG/L (ref 3.3–19.4)
KAPPA LC FREE/LAMBDA FREE SER NEPH: 1.54 {RATIO} (ref 0.26–1.65)
LAMBDA LC FREE SERPL-MCNC: 45.19 MG/L (ref 5.71–26.3)
POTASSIUM SERPL-SCNC: 4.4 MMOL/L (ref 3.5–5.1)
SODIUM BLD-SCNC: 141 MMOL/L (ref 135–145)
TOTAL PROTEIN: 5.9 GM/DL (ref 6.4–8.2)

## 2024-02-07 PROCEDURE — 83883 ASSAY NEPHELOMETRY NOT SPEC: CPT

## 2024-02-07 PROCEDURE — 36415 COLL VENOUS BLD VENIPUNCTURE: CPT

## 2024-02-07 PROCEDURE — 80053 COMPREHEN METABOLIC PANEL: CPT

## 2024-02-07 PROCEDURE — 84165 PROTEIN E-PHORESIS SERUM: CPT

## 2024-02-07 PROCEDURE — 84155 ASSAY OF PROTEIN SERUM: CPT

## 2024-02-07 PROCEDURE — 86320 SERUM IMMUNOELECTROPHORESIS: CPT

## 2024-02-09 LAB
ALBUMIN SERPL ELPH-MCNC: 2.9 GM/DL (ref 3.2–5.6)
ALPHA-1-GLOBULIN: 0.3 GM/DL (ref 0.1–0.4)
ALPHA-2-GLOBULIN: 0.7 GM/DL (ref 0.4–1.2)
BETA GLOBULIN: 0.9 GM/DL (ref 0.5–1.3)
GAMMA GLOBULIN: 1.1 GM/DL (ref 0.5–1.6)
KAPPA LC FREE SER-MCNC: 60.73 MG/L (ref 3.3–19.4)
KAPPA LC FREE/LAMBDA FREE SER NEPH: 1.34 {RATIO} (ref 0.26–1.65)
LAMBDA LC FREE SERPL-MCNC: 45.18 MG/L (ref 5.71–26.3)
SPEP INTERPRETATION: ABNORMAL
SPEP INTERPRETATION: NORMAL
TOTAL PROTEIN: 5.9 GM/DL (ref 6.4–8.2)

## 2024-02-12 DIAGNOSIS — C90.00 MULTIPLE MYELOMA NOT HAVING ACHIEVED REMISSION (HCC): ICD-10-CM

## 2024-02-12 RX ORDER — LENALIDOMIDE 10 MG/1
10 CAPSULE ORAL DAILY
Qty: 28 CAPSULE | Refills: 0 | Status: ACTIVE | OUTPATIENT
Start: 2024-02-12

## 2024-02-12 NOTE — PROGRESS NOTES
Received VM from patient requesting refill on oral chemo. Revlimid 10 mg chemo capsules reordered and e-scribed to [facility] pharmacy. Prescriber survey completed and new auth # [id number] obtained through [employer] portal. Auth valid for 30 days and attached to RX.

## 2024-02-21 ENCOUNTER — HOSPITAL ENCOUNTER (OUTPATIENT)
Dept: INFUSION THERAPY | Age: 74
End: 2024-02-21

## 2024-02-23 ENCOUNTER — CLINICAL DOCUMENTATION (OUTPATIENT)
Dept: ONCOLOGY | Age: 74
End: 2024-02-23

## 2024-02-23 NOTE — PROGRESS NOTES
Received VM from patient regarding blood thinner and upcoming dental procedure. Called patient back @ [phone]  and advised to hold blood thinner for 5 days prior to procedure. Patient states she already spoke with physician who confirmed when to stop blood thinner and also advised to hold oral chemo. No further needs addressed at this time. Next OV 02/28/2024 @ 1015.

## 2024-02-25 NOTE — PROGRESS NOTES
Patient Name: Marcia Bar  Patient : 1950  Patient MRN: [id number]     Primary Oncologist: Wander Berman MD  Referring Provider: Doc Ann MD     Date of Service: 2024      Chief Complaint:   Chief Complaint   Patient presents with    Follow-up     Patient Active Problem List:     Multiple myeloma not having achieved remission     HPI:   Ms. Bar is a 74-year-old very pleasant patient with a medical history significant for stage II cervical cancer diagnosed in , status post laser surgery, gastroesophageal reflux disease, initially referred to me on 2014 for evaluation of right tibial intramedullary lesion.    She stated that she has been having right leg pain since 2014, which has gradually been getting worse over time.  She was seen by her primary care physician and had x-ray on 2014. It showed abnormal lucency within the tibial shaft at the junction of the mid and proximal third.  MRI of the right lower extremity was done on 2014, and it showed marrow replacing 5.8 cm intramedullary lesion in the right mid tibial shaft with cortical breakthrough and adjacent periostitis.  This corresponds to the lytic/lucent lesion on the recent x-ray.  Differential considerations include lytic metastatic disease or multiple myeloma.  She was subsequently referred to me for evaluation.      She had upper sternum tumor for the last four years, which is about 8 by 10 cm in diameter. She otherwise does not have any other significant symptoms.      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  It is too small to measure the quantity.  Her ESR was mildly elevated to 49.      Laboratory results done on September 3, 2014, showed normal CBC, mild elevation in serum creatinine (1.3),

## 2024-02-28 ENCOUNTER — HOSPITAL ENCOUNTER (OUTPATIENT)
Dept: INFUSION THERAPY | Age: 74
Discharge: HOME | End: 2024-02-28
Payer: MEDICARE

## 2024-02-28 ENCOUNTER — OFFICE VISIT (OUTPATIENT)
Dept: ONCOLOGY | Age: 74
End: 2024-02-28
Payer: MEDICARE

## 2024-02-28 VITALS
BODY MASS INDEX: 26.87 KG/M2 | HEART RATE: 58 BPM | SYSTOLIC BLOOD PRESSURE: 175 MMHG | TEMPERATURE: 97.2 F | OXYGEN SATURATION: 100 % | DIASTOLIC BLOOD PRESSURE: 69 MMHG | WEIGHT: 167.2 LBS | HEIGHT: 66 IN

## 2024-02-28 DIAGNOSIS — C90.00 MULTIPLE MYELOMA NOT HAVING ACHIEVED REMISSION (HCC): Primary | ICD-10-CM

## 2024-02-28 PROCEDURE — 1036F TOBACCO NON-USER: CPT | Performed by: INTERNAL MEDICINE

## 2024-02-28 PROCEDURE — 99211 OFF/OP EST MAY X REQ PHY/QHP: CPT

## 2024-02-28 PROCEDURE — G8417 CALC BMI ABV UP PARAM F/U: HCPCS | Performed by: INTERNAL MEDICINE

## 2024-02-28 PROCEDURE — G8427 DOCREV CUR MEDS BY ELIG CLIN: HCPCS | Performed by: INTERNAL MEDICINE

## 2024-02-28 PROCEDURE — G8484 FLU IMMUNIZE NO ADMIN: HCPCS | Performed by: INTERNAL MEDICINE

## 2024-02-28 PROCEDURE — G8400 PT W/DXA NO RESULTS DOC: HCPCS | Performed by: INTERNAL MEDICINE

## 2024-02-28 PROCEDURE — 3017F COLORECTAL CA SCREEN DOC REV: CPT | Performed by: INTERNAL MEDICINE

## 2024-02-28 PROCEDURE — 99214 OFFICE O/P EST MOD 30 MIN: CPT | Performed by: INTERNAL MEDICINE

## 2024-02-28 PROCEDURE — 1090F PRES/ABSN URINE INCON ASSESS: CPT | Performed by: INTERNAL MEDICINE

## 2024-02-28 PROCEDURE — 1123F ACP DISCUSS/DSCN MKR DOCD: CPT | Performed by: INTERNAL MEDICINE

## 2024-02-28 NOTE — PROGRESS NOTES
MA Rooming Questions  Patient: Marcia Bar  MRN: [id number]    Date: 2/28/2024        1. Do you have any new issues?   yes - what medications does she need to stop for her dental procedure on 4/17/24         2. Do you need any refills on medications?    no    3. Have you had any imaging done since your last visit?   yes - mammogram 1/24    4. Have you been hospitalized or seen in the emergency room since your last visit here?   no    5. Did the patient have a depression screening completed today? No    No data recorded     PHQ-9 Given to (if applicable):               PHQ-9 Score (if applicable):                     [] Positive     []  Negative              Does question #9 need addressed (if applicable)                     [] Yes    []  No               Elisabeth Alfaro CMA

## 2024-03-11 DIAGNOSIS — C90.00 MULTIPLE MYELOMA NOT HAVING ACHIEVED REMISSION (HCC): ICD-10-CM

## 2024-03-11 RX ORDER — LENALIDOMIDE 10 MG/1
10 CAPSULE ORAL DAILY
Qty: 28 CAPSULE | Refills: 0 | Status: ACTIVE | OUTPATIENT
Start: 2024-03-11

## 2024-03-11 NOTE — PROGRESS NOTES
Revlimid 10 mg chemo capsules reordered and sent to [facility] pharmacy. Prescriber survey completed and new auth # [id number] obtained through [employer] portal. Auth valid for 30 days and attached to RX.

## 2024-03-16 DIAGNOSIS — C90.00 MULTIPLE MYELOMA NOT HAVING ACHIEVED REMISSION (HCC): ICD-10-CM

## 2024-03-18 RX ORDER — METOPROLOL SUCCINATE 25 MG/1
25 TABLET, EXTENDED RELEASE ORAL DAILY
Qty: 90 TABLET | Refills: 0 | Status: SHIPPED | OUTPATIENT
Start: 2024-03-18

## 2024-04-03 ENCOUNTER — PREP FOR PROCEDURE (OUTPATIENT)
Dept: ONCOLOGY | Age: 74
End: 2024-04-03

## 2024-04-03 ENCOUNTER — CLINICAL DOCUMENTATION (OUTPATIENT)
Dept: ONCOLOGY | Age: 74
End: 2024-04-03

## 2024-04-03 RX ORDER — CEFUROXIME AXETIL 250 MG/1
250 TABLET ORAL 2 TIMES DAILY
Qty: 10 TABLET | Refills: 0 | Status: SHIPPED | OUTPATIENT
Start: 2024-04-03 | End: 2024-04-08

## 2024-04-03 NOTE — PROGRESS NOTES
Received VM from patient requesting ATB for UTI symptoms. RX for ceftin 250 mg BID x5 days e-scribed to [facility] pharmacy on [address] per physician order. Attempted to call patient @ [phone] to update; however, no answer. VM left with this RN direct number should patient have any further needs.

## 2024-04-11 DIAGNOSIS — C90.00 MULTIPLE MYELOMA NOT HAVING ACHIEVED REMISSION (HCC): ICD-10-CM

## 2024-04-11 RX ORDER — LENALIDOMIDE 10 MG/1
10 CAPSULE ORAL DAILY
Qty: 28 CAPSULE | Refills: 0 | Status: ACTIVE | OUTPATIENT
Start: 2024-04-11

## 2024-04-11 NOTE — PROGRESS NOTES
Revlimid 10 mg chemo capsules reordered and sent to [facility] pharmacy. Prescriber survey completed and new auth # [id number]  obtained through [employer] portal. Auth valid for 30 days and attached to RX.

## 2024-05-09 ENCOUNTER — CLINICAL DOCUMENTATION (OUTPATIENT)
Dept: ONCOLOGY | Age: 74
End: 2024-05-09

## 2024-05-09 NOTE — PROGRESS NOTES
Received VM from patient stating that physician has changed frequency on oral chemo from daily to 3 days on then 1 day off. Patient states she is due for refill; but requesting not to order revlimid at this time since she has 6 weeks worth of medication.

## 2024-05-20 RX ORDER — NAPROXEN 250 MG/1
250 TABLET ORAL 2 TIMES DAILY WITH MEALS
Qty: 30 TABLET | Refills: 1 | Status: SHIPPED | OUTPATIENT
Start: 2024-05-20 | End: 2024-06-04

## 2024-06-05 ENCOUNTER — HOSPITAL ENCOUNTER (OUTPATIENT)
Dept: INFUSION THERAPY | Age: 74
Discharge: HOME | End: 2024-06-05
Payer: MEDICARE

## 2024-06-05 DIAGNOSIS — C90.00 MULTIPLE MYELOMA NOT HAVING ACHIEVED REMISSION (HCC): ICD-10-CM

## 2024-06-05 LAB
ALBUMIN SERPL-MCNC: 3.6 GM/DL (ref 3.4–5)
ALP BLD-CCNC: 148 IU/L (ref 40–128)
ALT SERPL-CCNC: 14 U/L (ref 10–40)
ANION GAP SERPL CALCULATED.3IONS-SCNC: 12 MMOL/L (ref 7–16)
AST SERPL-CCNC: 15 IU/L (ref 15–37)
BASOPHILS ABSOLUTE: 0 K/CU MM
BASOPHILS RELATIVE PERCENT: 0.6 % (ref 0–1)
BILIRUB SERPL-MCNC: 0.4 MG/DL (ref 0–1)
BUN SERPL-MCNC: 21 MG/DL (ref 6–23)
CALCIUM SERPL-MCNC: 8.4 MG/DL (ref 8.3–10.6)
CHLORIDE BLD-SCNC: 105 MMOL/L (ref 99–110)
CO2: 25 MMOL/L (ref 21–32)
CREAT SERPL-MCNC: 0.9 MG/DL (ref 0.6–1.1)
DIFFERENTIAL TYPE: ABNORMAL
EOSINOPHILS ABSOLUTE: 0.2 K/CU MM
EOSINOPHILS RELATIVE PERCENT: 5.7 % (ref 0–3)
GFR, ESTIMATED: 68 ML/MIN/1.73M2
GLUCOSE SERPL-MCNC: 76 MG/DL (ref 70–99)
HCT VFR BLD CALC: 26.7 % (ref 37–47)
HEMOGLOBIN: 8.3 GM/DL (ref 12.5–16)
IGA: 447 MG/DL (ref 69–382)
IGG,SERUM: 1145 MG/DL (ref 723–1685)
IGM,SERUM: 39 MG/DL (ref 62–277)
LACTATE DEHYDROGENASE: 154 IU/L (ref 120–246)
LYMPHOCYTES ABSOLUTE: 0.8 K/CU MM
LYMPHOCYTES RELATIVE PERCENT: 26 % (ref 24–44)
MCH RBC QN AUTO: 28.2 PG (ref 27–31)
MCHC RBC AUTO-ENTMCNC: 31.1 % (ref 32–36)
MCV RBC AUTO: 90.8 FL (ref 78–100)
MONOCYTES ABSOLUTE: 0.6 K/CU MM
MONOCYTES RELATIVE PERCENT: 17.8 % (ref 0–4)
NEUTROPHILS ABSOLUTE: 1.6 K/CU MM
NEUTROPHILS RELATIVE PERCENT: 49.9 % (ref 36–66)
PDW BLD-RTO: 17.3 % (ref 11.7–14.9)
PLATELET # BLD: 207 K/CU MM (ref 140–440)
PMV BLD AUTO: 10.2 FL (ref 7.5–11.1)
POTASSIUM SERPL-SCNC: 4.2 MMOL/L (ref 3.5–5.1)
RBC # BLD: 2.94 M/CU MM (ref 4.2–5.4)
REASON FOR REJECTION: NORMAL
REASON FOR REJECTION: NORMAL
REJECTED TEST: NORMAL
REJECTED TEST: NORMAL
SED RATE, AUTOMATED: 31 MM/HR (ref 0–30)
SODIUM BLD-SCNC: 142 MMOL/L (ref 135–145)
TOTAL PROTEIN: 6.6 GM/DL (ref 6.4–8.2)
WBC # BLD: 3.2 K/CU MM (ref 4–10.5)

## 2024-06-05 PROCEDURE — 86320 SERUM IMMUNOELECTROPHORESIS: CPT

## 2024-06-05 PROCEDURE — 82784 ASSAY IGA/IGD/IGG/IGM EACH: CPT

## 2024-06-05 PROCEDURE — 84165 PROTEIN E-PHORESIS SERUM: CPT

## 2024-06-05 PROCEDURE — 36415 COLL VENOUS BLD VENIPUNCTURE: CPT

## 2024-06-05 PROCEDURE — 83615 LACTATE (LD) (LDH) ENZYME: CPT

## 2024-06-05 PROCEDURE — 82232 ASSAY OF BETA-2 PROTEIN: CPT

## 2024-06-05 PROCEDURE — 84155 ASSAY OF PROTEIN SERUM: CPT

## 2024-06-05 PROCEDURE — 83883 ASSAY NEPHELOMETRY NOT SPEC: CPT

## 2024-06-05 PROCEDURE — 80053 COMPREHEN METABOLIC PANEL: CPT

## 2024-06-05 PROCEDURE — 85652 RBC SED RATE AUTOMATED: CPT

## 2024-06-05 PROCEDURE — 86334 IMMUNOFIX E-PHORESIS SERUM: CPT

## 2024-06-05 PROCEDURE — 85025 COMPLETE CBC W/AUTO DIFF WBC: CPT

## 2024-06-06 LAB — B2 MICROGLOB SERPL-MCNC: 3.5 MG/L

## 2024-06-07 LAB
KAPPA LC FREE SER-MCNC: 81.08 MG/L (ref 3.3–19.4)
KAPPA LC FREE/LAMBDA FREE SER NEPH: 1.54 {RATIO} (ref 0.26–1.65)
LAMBDA LC FREE SERPL-MCNC: 52.67 MG/L (ref 5.71–26.3)
Lab: NORMAL
TEST NAME: NORMAL

## 2024-06-09 PROBLEM — R53.82 CHRONIC FATIGUE: Status: ACTIVE | Noted: 2024-06-09

## 2024-06-09 LAB
Lab: ABNORMAL
TEST NAME: ABNORMAL

## 2024-06-12 ENCOUNTER — OFFICE VISIT (OUTPATIENT)
Dept: ONCOLOGY | Age: 74
End: 2024-06-12
Payer: MEDICARE

## 2024-06-12 ENCOUNTER — HOSPITAL ENCOUNTER (OUTPATIENT)
Dept: INFUSION THERAPY | Age: 74
Discharge: HOME | End: 2024-06-12
Payer: MEDICARE

## 2024-06-12 VITALS
HEIGHT: 66 IN | SYSTOLIC BLOOD PRESSURE: 143 MMHG | DIASTOLIC BLOOD PRESSURE: 62 MMHG | TEMPERATURE: 97.7 F | WEIGHT: 178.4 LBS | BODY MASS INDEX: 28.67 KG/M2 | HEART RATE: 58 BPM | OXYGEN SATURATION: 97 %

## 2024-06-12 DIAGNOSIS — R53.82 CHRONIC FATIGUE: ICD-10-CM

## 2024-06-12 DIAGNOSIS — C90.00 MULTIPLE MYELOMA NOT HAVING ACHIEVED REMISSION (HCC): Primary | ICD-10-CM

## 2024-06-12 PROCEDURE — 99211 OFF/OP EST MAY X REQ PHY/QHP: CPT

## 2024-06-12 PROCEDURE — 99214 OFFICE O/P EST MOD 30 MIN: CPT | Performed by: INTERNAL MEDICINE

## 2024-06-12 PROCEDURE — G8417 CALC BMI ABV UP PARAM F/U: HCPCS | Performed by: INTERNAL MEDICINE

## 2024-06-12 PROCEDURE — 1123F ACP DISCUSS/DSCN MKR DOCD: CPT | Performed by: INTERNAL MEDICINE

## 2024-06-12 PROCEDURE — G8400 PT W/DXA NO RESULTS DOC: HCPCS | Performed by: INTERNAL MEDICINE

## 2024-06-12 PROCEDURE — 1090F PRES/ABSN URINE INCON ASSESS: CPT | Performed by: INTERNAL MEDICINE

## 2024-06-12 PROCEDURE — 3017F COLORECTAL CA SCREEN DOC REV: CPT | Performed by: INTERNAL MEDICINE

## 2024-06-12 PROCEDURE — 1036F TOBACCO NON-USER: CPT | Performed by: INTERNAL MEDICINE

## 2024-06-12 PROCEDURE — G8427 DOCREV CUR MEDS BY ELIG CLIN: HCPCS | Performed by: INTERNAL MEDICINE

## 2024-06-12 RX ORDER — DULOXETIN HYDROCHLORIDE 60 MG/1
60 CAPSULE, DELAYED RELEASE ORAL DAILY
Qty: 90 CAPSULE | Refills: 1 | Status: SHIPPED | OUTPATIENT
Start: 2024-06-12

## 2024-06-12 RX ORDER — METOPROLOL SUCCINATE 25 MG/1
25 TABLET, EXTENDED RELEASE ORAL DAILY
Qty: 90 TABLET | Refills: 0 | Status: SHIPPED | OUTPATIENT
Start: 2024-06-12

## 2024-06-12 ASSESSMENT — PATIENT HEALTH QUESTIONNAIRE - PHQ9
2. FEELING DOWN, DEPRESSED OR HOPELESS: NOT AT ALL
SUM OF ALL RESPONSES TO PHQ QUESTIONS 1-9: 0
1. LITTLE INTEREST OR PLEASURE IN DOING THINGS: NOT AT ALL
SUM OF ALL RESPONSES TO PHQ QUESTIONS 1-9: 0
SUM OF ALL RESPONSES TO PHQ9 QUESTIONS 1 & 2: 0

## 2024-06-12 NOTE — PROGRESS NOTES
MA Rooming Questions  Patient: Marcia Bar  MRN: [id number]    Date: 6/12/2024        1. Do you have any new issues?   no         2. Do you need any refills on medications?    yes - Metoprolol.    3. Have you had any imaging done since your last visit?   no    4. Have you been hospitalized or seen in the emergency room since your last visit here?   no    5. Did the patient have a depression screening completed today? No    PHQ-9 Total Score: 0 (6/12/2024  9:24 AM)       PHQ-9 Given to (if applicable):               PHQ-9 Score (if applicable):                     [] Positive     []  Negative              Does question #9 need addressed (if applicable)                     [] Yes    []  No               Margarita Faust MA      
are consistent with recurrent multiple myeloma, we started first-line chemotherapy on July 7, 2021. Since she has been in remission for more than 4 years, we decided to rechallenge with velcade, revlimid and dexamethason.      We stopped velcade after 9/8/2021 therapy since she could not tolerate it well. We decided to continue with revlimid only since then.     We changed revlimid dose to 10 mg 21 days on 7 days off schedule starting from 11/25/23 due to significant myelosuppression with continuous therapy. We also stopped dexamethasone.     She is here for close monitoring of side effects and toxicity from chemotherapy. She is tolerating current chemo well.     I recognize that her upper sternal mass is decreasing in size. I recognize that her serum light chains were mildly elevated on 6/5/24 blood test, but k/l ratio was normal. Her serum creatinine was 0.9 mg/dl on 6/5/24.     I will repeat multiple myeloma work-ups again in 3 months and I will follow-up with the results.    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     Revlimid induced diarrhea - not well controlled by imodium. She is on lomotil 1 tablet Q6 prn with improvement in her symptom.    Edema of lower legs - recommend to continue with Lasix 20 mg daily as needed basis.  I will continue to monitor her edema closely.    Myeloma bone disease - recommend to continue with zometa every 12 weeks for now.     Revlimid induced thrombosis prophylaxis - I recommend her to continue with aspirin 81 mg daily.     I also recommend to continue with zoledronic acid every twelve month intervals.      For her hypertension, I recommend to continue with metoprolol 50 mg daily for now.  Her blood pressure reading on today is normal. I also recommend her to continue with vitamin B12 supplementation.     Health maintenance - I asked her to have age-appropriate cancer screening, exercise and low-sodium diet.     I answered all her questions and

## 2024-06-17 RX ORDER — FUROSEMIDE 20 MG/1
TABLET ORAL
Qty: 30 TABLET | Refills: 0 | Status: SHIPPED | OUTPATIENT
Start: 2024-06-17

## 2024-06-20 ENCOUNTER — HOSPITAL ENCOUNTER (EMERGENCY)
Age: 74
Discharge: HOME | End: 2024-06-20
Attending: EMERGENCY MEDICINE
Payer: MEDICARE

## 2024-06-20 VITALS
OXYGEN SATURATION: 97 % | HEIGHT: 66 IN | HEART RATE: 75 BPM | RESPIRATION RATE: 18 BRPM | WEIGHT: 167 LBS | TEMPERATURE: 96.2 F | BODY MASS INDEX: 26.84 KG/M2 | DIASTOLIC BLOOD PRESSURE: 59 MMHG | SYSTOLIC BLOOD PRESSURE: 131 MMHG

## 2024-06-20 DIAGNOSIS — Z51.89 VISIT FOR WOUND CHECK: ICD-10-CM

## 2024-06-20 DIAGNOSIS — L03.90 CELLULITIS, UNSPECIFIED CELLULITIS SITE: Primary | ICD-10-CM

## 2024-06-20 PROCEDURE — 90715 TDAP VACCINE 7 YRS/> IM: CPT | Performed by: EMERGENCY MEDICINE

## 2024-06-20 PROCEDURE — 6370000000 HC RX 637 (ALT 250 FOR IP): Performed by: EMERGENCY MEDICINE

## 2024-06-20 PROCEDURE — 99284 EMERGENCY DEPT VISIT MOD MDM: CPT

## 2024-06-20 PROCEDURE — 90471 IMMUNIZATION ADMIN: CPT | Performed by: EMERGENCY MEDICINE

## 2024-06-20 PROCEDURE — 6360000002 HC RX W HCPCS: Performed by: EMERGENCY MEDICINE

## 2024-06-20 RX ORDER — CEPHALEXIN 500 MG/1
500 CAPSULE ORAL 4 TIMES DAILY
Qty: 40 CAPSULE | Refills: 0 | Status: SHIPPED | OUTPATIENT
Start: 2024-06-20 | End: 2024-06-30

## 2024-06-20 RX ORDER — SULFAMETHOXAZOLE AND TRIMETHOPRIM 800; 160 MG/1; MG/1
1 TABLET ORAL 2 TIMES DAILY
Qty: 20 TABLET | Refills: 0 | Status: SHIPPED | OUTPATIENT
Start: 2024-06-20 | End: 2024-06-30

## 2024-06-20 RX ORDER — CEPHALEXIN 500 MG/1
500 CAPSULE ORAL ONCE
Status: COMPLETED | OUTPATIENT
Start: 2024-06-20 | End: 2024-06-20

## 2024-06-20 RX ORDER — SULFAMETHOXAZOLE AND TRIMETHOPRIM 800; 160 MG/1; MG/1
1 TABLET ORAL ONCE
Status: COMPLETED | OUTPATIENT
Start: 2024-06-20 | End: 2024-06-20

## 2024-06-20 RX ADMIN — SULFAMETHOXAZOLE AND TRIMETHOPRIM 1 TABLET: 800; 160 TABLET ORAL at 20:33

## 2024-06-20 RX ADMIN — TETANUS TOXOID, REDUCED DIPHTHERIA TOXOID AND ACELLULAR PERTUSSIS VACCINE, ADSORBED 0.5 ML: 5; 2.5; 8; 8; 2.5 SUSPENSION INTRAMUSCULAR at 20:33

## 2024-06-20 RX ADMIN — CEPHALEXIN 500 MG: 500 CAPSULE ORAL at 20:33

## 2024-06-20 ASSESSMENT — LIFESTYLE VARIABLES
HOW OFTEN DO YOU HAVE A DRINK CONTAINING ALCOHOL: NEVER
HOW MANY STANDARD DRINKS CONTAINING ALCOHOL DO YOU HAVE ON A TYPICAL DAY: PATIENT DOES NOT DRINK

## 2024-06-20 ASSESSMENT — ENCOUNTER SYMPTOMS
ALLERGIC/IMMUNOLOGIC NEGATIVE: 1
EYES NEGATIVE: 1
GASTROINTESTINAL NEGATIVE: 1
RESPIRATORY NEGATIVE: 1
COLOR CHANGE: 1

## 2024-06-20 ASSESSMENT — PAIN DESCRIPTION - ORIENTATION: ORIENTATION: LEFT

## 2024-06-20 ASSESSMENT — PAIN SCALES - GENERAL: PAINLEVEL_OUTOF10: 0

## 2024-06-20 ASSESSMENT — PAIN DESCRIPTION - LOCATION: LOCATION: ANKLE

## 2024-06-20 ASSESSMENT — PAIN - FUNCTIONAL ASSESSMENT: PAIN_FUNCTIONAL_ASSESSMENT: NONE - DENIES PAIN

## 2024-06-20 NOTE — ED PROVIDER NOTES
imaging she appears nontoxic nonseptic okay plan discharge.  Stable.  I do not think she has DVT or necrotizing fasciitis patient stable pleasant of care plan.  Did dress the wound and gave wound care instructions          CLINICAL IMPRESSION:  Final diagnoses:   Visit for wound check   Cellulitis, unspecified cellulitis site       (Please note that portions of this note may have been completed with a voice recognition program. Efforts were made to edit the dictations but occasionally words aremis-transcribed.)    DISPOSITION REFERRAL (if applicable):  Doc Ann MD  [address]  [place]  [facility] [address]  [phone]    Schedule an appointment as soon as possible for a visit in 1 day      [facility] Emergency Department  [address] [address]  [phone]    If symptoms worsen    [facility] Emergency Department  [address] [facility] [address]  [phone]  In 2 days  For wound re-check      DISPOSITION MEDICATIONS (if applicable):  Discharge Medication List as of 6/20/2024  8:30 PM        START taking these medications    Details   sulfamethoxazole-trimethoprim (BACTRIM DS) 800-160 MG per tablet Take 1 tablet by mouth 2 times daily for 10 days, Disp-20 tablet, R-0Normal      cephALEXin (KEFLEX) 500 MG capsule Take 1 capsule by mouth 4 times daily for 10 days, Disp-40 capsule, R-0Normal                DO Guadalupe Tom James, DO  06/20/24 2042

## 2024-06-21 NOTE — ED TRIAGE NOTES
Patient has wound on lateral aspect of left ankle. Says she noticed a spot there a few days ago. Spot started draining today. Has leg swelling and redness but she says that is normal for her due to radiation to legs in 2017.

## 2024-06-24 ENCOUNTER — CLINICAL DOCUMENTATION (OUTPATIENT)
Dept: ONCOLOGY | Age: 74
End: 2024-06-24

## 2024-06-24 ENCOUNTER — PREP FOR PROCEDURE (OUTPATIENT)
Dept: ONCOLOGY | Age: 74
End: 2024-06-24

## 2024-06-24 NOTE — PROGRESS NOTES
Received VM from patient with concerns regarding recent ER visit. Patient completed course of PO ATB for cellulitis. Patient requesting to schedule appointment with Dr. Berman. Patient added to schedule tomorrow 06/25/2024 at 1430. Called patient @ [phone] to update; however, no answer. VM left with this RN direct number should any additional needs present.

## 2024-06-25 ENCOUNTER — OFFICE VISIT (OUTPATIENT)
Dept: ONCOLOGY | Age: 74
End: 2024-06-25
Payer: MEDICARE

## 2024-06-25 ENCOUNTER — CLINICAL DOCUMENTATION (OUTPATIENT)
Dept: ONCOLOGY | Age: 74
End: 2024-06-25

## 2024-06-25 ENCOUNTER — HOSPITAL ENCOUNTER (OUTPATIENT)
Dept: INFUSION THERAPY | Age: 74
Discharge: HOME | End: 2024-06-25
Payer: MEDICARE

## 2024-06-25 VITALS
HEIGHT: 66 IN | HEART RATE: 70 BPM | SYSTOLIC BLOOD PRESSURE: 138 MMHG | TEMPERATURE: 97.8 F | WEIGHT: 172.4 LBS | DIASTOLIC BLOOD PRESSURE: 72 MMHG | BODY MASS INDEX: 27.71 KG/M2 | OXYGEN SATURATION: 99 % | RESPIRATION RATE: 16 BRPM

## 2024-06-25 DIAGNOSIS — C90.00 MULTIPLE MYELOMA NOT HAVING ACHIEVED REMISSION (HCC): Primary | ICD-10-CM

## 2024-06-25 PROCEDURE — 99214 OFFICE O/P EST MOD 30 MIN: CPT | Performed by: INTERNAL MEDICINE

## 2024-06-25 PROCEDURE — 1036F TOBACCO NON-USER: CPT | Performed by: INTERNAL MEDICINE

## 2024-06-25 PROCEDURE — G8427 DOCREV CUR MEDS BY ELIG CLIN: HCPCS | Performed by: INTERNAL MEDICINE

## 2024-06-25 PROCEDURE — 1123F ACP DISCUSS/DSCN MKR DOCD: CPT | Performed by: INTERNAL MEDICINE

## 2024-06-25 PROCEDURE — G8400 PT W/DXA NO RESULTS DOC: HCPCS | Performed by: INTERNAL MEDICINE

## 2024-06-25 PROCEDURE — 1090F PRES/ABSN URINE INCON ASSESS: CPT | Performed by: INTERNAL MEDICINE

## 2024-06-25 PROCEDURE — G8417 CALC BMI ABV UP PARAM F/U: HCPCS | Performed by: INTERNAL MEDICINE

## 2024-06-25 PROCEDURE — 99211 OFF/OP EST MAY X REQ PHY/QHP: CPT

## 2024-06-25 PROCEDURE — 3017F COLORECTAL CA SCREEN DOC REV: CPT | Performed by: INTERNAL MEDICINE

## 2024-06-25 ASSESSMENT — PATIENT HEALTH QUESTIONNAIRE - PHQ9
SUM OF ALL RESPONSES TO PHQ QUESTIONS 1-9: 0
2. FEELING DOWN, DEPRESSED OR HOPELESS: NOT AT ALL
1. LITTLE INTEREST OR PLEASURE IN DOING THINGS: NOT AT ALL
SUM OF ALL RESPONSES TO PHQ QUESTIONS 1-9: 0
SUM OF ALL RESPONSES TO PHQ9 QUESTIONS 1 & 2: 0

## 2024-06-25 NOTE — PROGRESS NOTES
MA Rooming Questions  Patient: Marcia Bar  MRN: [id number]    Date: 6/25/2024        1. Do you have any new issues?   yes - cellulitis         2. Do you need any refills on medications?    no    3. Have you had any imaging done since your last visit?   no    4. Have you been hospitalized or seen in the emergency room since your last visit here?   yes - ED    5. Did the patient have a depression screening completed today? Yes    No data recorded     PHQ-9 Given to (if applicable):               PHQ-9 Score (if applicable):                     [] Positive     []  Negative              Does question #9 need addressed (if applicable)                     [] Yes    []  No               Maria Fernanda Sharma CMA

## 2024-06-25 NOTE — PROGRESS NOTES
Patient Name: Marcia Bar  Patient : 1950  Patient MRN: [id number]     Primary Oncologist: Wander Berman MD  Referring Provider: Doc Ann MD     Date of Service: 2024      Chief Complaint:   Chief Complaint   Patient presents with    Follow-up     Patient Active Problem List:     Multiple myeloma not having achieved remission     HPI:   Ms. Bar is a 74-year-old very pleasant patient with a medical history significant for stage II cervical cancer diagnosed in , status post laser surgery, gastroesophageal reflux disease, initially referred to me on 2014 for evaluation of right tibial intramedullary lesion.    She stated that she has been having right leg pain since 2014, which has gradually been getting worse over time.  She was seen by her primary care physician and had x-ray on 2014. It showed abnormal lucency within the tibial shaft at the junction of the mid and proximal third.  MRI of the right lower extremity was done on 2014, and it showed marrow replacing 5.8 cm intramedullary lesion in the right mid tibial shaft with cortical breakthrough and adjacent periostitis.  This corresponds to the lytic/lucent lesion on the recent x-ray.  Differential considerations include lytic metastatic disease or multiple myeloma.  She was subsequently referred to me for evaluation.      She had upper sternum tumor for the last four years, which is about 8 by 10 cm in diameter. She otherwise does not have any other significant symptoms.      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  It is too small to measure the quantity.  Her ESR was mildly elevated to 49.      Laboratory results done on September 3, 2014, showed normal CBC, mild elevation in serum creatinine (1.3),

## 2024-06-25 NOTE — PROGRESS NOTES
Patient in clinic for OV today 06/25/2024. Patient recently went o [place] ER for cellulitis and discharged on 2 oral antibiotics: Bactrim DS daily and Keflex 500 mg daily x10 days. Patient started antibiotics on Friday 06/21/2024. BLE assessed. Elastic bandage wrap  to LLE removed. Small amount of yellowish drainage noted to gauze. Non-adherent gauze applied to open wound and rewrapped with bandage. Patient instructed to leave [place] once home as she only wears dressing while at work. Advised not to wrap bandage too tightly d/t venous insufficiency. Also, patient to hold revlimid 10 mg daily x2 weeks as recommended by physician. Voices understanding. This RN will continue to follow up.

## 2024-07-10 DIAGNOSIS — C90.00 MULTIPLE MYELOMA NOT HAVING ACHIEVED REMISSION (HCC): ICD-10-CM

## 2024-07-10 NOTE — PROGRESS NOTES
Received VM from patient needing refill -Revlimid 10 mg chemo capsules reordered and sent to [facility] pharmacy on [address]. Prescriber survey completed and new auth # [id number] obtained through [email or web address] portal. Auth valid for 30 days and attached to RX.

## 2024-07-12 RX ORDER — LENALIDOMIDE 10 MG/1
10 CAPSULE ORAL DAILY
Qty: 28 CAPSULE | Refills: 0 | Status: ACTIVE | OUTPATIENT
Start: 2024-07-12

## 2024-07-25 DIAGNOSIS — C90.00 MULTIPLE MYELOMA NOT HAVING ACHIEVED REMISSION (HCC): Primary | ICD-10-CM

## 2024-07-25 RX ORDER — SODIUM CHLORIDE 9 MG/ML
5-250 INJECTION, SOLUTION INTRAVENOUS PRN
OUTPATIENT
Start: 2024-07-31

## 2024-07-25 RX ORDER — ACETAMINOPHEN 325 MG/1
650 TABLET ORAL
OUTPATIENT
Start: 2024-07-31

## 2024-07-25 RX ORDER — FAMOTIDINE 10 MG/ML
20 INJECTION, SOLUTION INTRAVENOUS
OUTPATIENT
Start: 2024-07-31

## 2024-07-25 RX ORDER — SODIUM CHLORIDE 0.9 % (FLUSH) 0.9 %
5-40 SYRINGE (ML) INJECTION PRN
OUTPATIENT
Start: 2024-07-31

## 2024-07-25 RX ORDER — EPINEPHRINE 1 MG/ML
0.3 INJECTION, SOLUTION, CONCENTRATE INTRAVENOUS PRN
OUTPATIENT
Start: 2024-07-31

## 2024-07-25 RX ORDER — HEPARIN 100 UNIT/ML
500 SYRINGE INTRAVENOUS PRN
OUTPATIENT
Start: 2024-07-31

## 2024-07-25 RX ORDER — ZOLEDRONIC ACID 0.04 MG/ML
4 INJECTION, SOLUTION INTRAVENOUS ONCE
OUTPATIENT
Start: 2024-07-31 | End: 2024-07-31

## 2024-07-25 RX ORDER — ALBUTEROL SULFATE 90 UG/1
4 AEROSOL, METERED RESPIRATORY (INHALATION) PRN
OUTPATIENT
Start: 2024-07-31

## 2024-07-25 RX ORDER — MEPERIDINE HYDROCHLORIDE 25 MG/ML
12.5 INJECTION INTRAMUSCULAR; INTRAVENOUS; SUBCUTANEOUS PRN
OUTPATIENT
Start: 2024-07-31

## 2024-07-25 RX ORDER — ONDANSETRON 2 MG/ML
8 INJECTION INTRAMUSCULAR; INTRAVENOUS
OUTPATIENT
Start: 2024-07-31

## 2024-07-25 RX ORDER — SODIUM CHLORIDE 9 MG/ML
INJECTION, SOLUTION INTRAVENOUS CONTINUOUS
OUTPATIENT
Start: 2024-07-31

## 2024-07-25 RX ORDER — DIPHENHYDRAMINE HYDROCHLORIDE 50 MG/ML
50 INJECTION INTRAMUSCULAR; INTRAVENOUS
OUTPATIENT
Start: 2024-07-31

## 2024-08-14 ENCOUNTER — HOSPITAL ENCOUNTER (OUTPATIENT)
Dept: INFUSION THERAPY | Age: 74
Discharge: HOME | End: 2024-08-14
Payer: MEDICARE

## 2024-08-14 VITALS
TEMPERATURE: 97.2 F | BODY MASS INDEX: 28.87 KG/M2 | WEIGHT: 179.6 LBS | OXYGEN SATURATION: 99 % | SYSTOLIC BLOOD PRESSURE: 162 MMHG | HEIGHT: 66 IN | HEART RATE: 56 BPM | DIASTOLIC BLOOD PRESSURE: 64 MMHG

## 2024-08-14 DIAGNOSIS — C90.00 MULTIPLE MYELOMA NOT HAVING ACHIEVED REMISSION (HCC): Primary | ICD-10-CM

## 2024-08-14 LAB
ALBUMIN SERPL-MCNC: 3.5 GM/DL (ref 3.4–5)
ALP BLD-CCNC: 90 IU/L (ref 40–129)
ALT SERPL-CCNC: 20 U/L (ref 10–40)
ANION GAP SERPL CALCULATED.3IONS-SCNC: 11 MMOL/L (ref 7–16)
AST SERPL-CCNC: 27 IU/L (ref 15–37)
BILIRUB SERPL-MCNC: 0.5 MG/DL (ref 0–1)
BUN SERPL-MCNC: 26 MG/DL (ref 6–23)
CALCIUM SERPL-MCNC: 9 MG/DL (ref 8.3–10.6)
CHLORIDE BLD-SCNC: 105 MMOL/L (ref 99–110)
CO2: 23 MMOL/L (ref 21–32)
CREAT SERPL-MCNC: 0.8 MG/DL (ref 0.6–1.1)
EGFR, POC: 78 ML/MIN/1.73M2
GFR, ESTIMATED: 78 ML/MIN/1.73M2
GLUCOSE BLD-MCNC: 82 MG/DL (ref 70–99)
GLUCOSE SERPL-MCNC: 82 MG/DL (ref 70–99)
MAGNESIUM: 1.7 MG/DL (ref 1.8–2.4)
PHOSPHORUS: 4.7 MG/DL (ref 2.5–4.9)
POC BUN: 27 MG/DL (ref 6–23)
POC CALCIUM: 1.22 MMOL/L (ref 1.15–1.33)
POC CHLORIDE: 107 MMOL/L (ref 99–110)
POC CO2: 24 MMOL/L (ref 21–32)
POC CREATININE: 0.8 MG/DL (ref 0.5–1.2)
POTASSIUM SERPL-SCNC: 4.5 MMOL/L (ref 3.5–5.1)
POTASSIUM SERPL-SCNC: 5.1 MMOL/L (ref 3.5–5.1)
SODIUM BLD-SCNC: 139 MMOL/L (ref 135–145)
SODIUM BLD-SCNC: 139 MMOL/L (ref 135–145)
SOURCE, BLOOD GAS: ABNORMAL
TOTAL PROTEIN: 6.5 GM/DL (ref 6.4–8.2)

## 2024-08-14 PROCEDURE — 84100 ASSAY OF PHOSPHORUS: CPT

## 2024-08-14 PROCEDURE — 2580000003 HC RX 258: Performed by: INTERNAL MEDICINE

## 2024-08-14 PROCEDURE — 80053 COMPREHEN METABOLIC PANEL: CPT

## 2024-08-14 PROCEDURE — 96365 THER/PROPH/DIAG IV INF INIT: CPT

## 2024-08-14 PROCEDURE — 6360000002 HC RX W HCPCS: Performed by: INTERNAL MEDICINE

## 2024-08-14 PROCEDURE — 83735 ASSAY OF MAGNESIUM: CPT

## 2024-08-14 RX ADMIN — ZOLEDRONIC ACID 4 MG: 4 INJECTION, SOLUTION, CONCENTRATE INTRAVENOUS at 11:13

## 2024-08-14 NOTE — PROGRESS NOTES
Ambulated to infusion area, here today for Zometa. No concerns at this time. PIV placed in left AC, positive blood return noted. Labs reviewed, Labs within defined limits.  Zometa administered as ordered. Call light within reach. Tolerated infusion without incident.  Discharge instructions provided. RTC 09/11 for labs.

## 2024-08-19 DIAGNOSIS — C90.00 MULTIPLE MYELOMA NOT HAVING ACHIEVED REMISSION (HCC): ICD-10-CM

## 2024-08-19 RX ORDER — LENALIDOMIDE 10 MG/1
10 CAPSULE ORAL DAILY
Qty: 28 CAPSULE | Refills: 0 | Status: ACTIVE | OUTPATIENT
Start: 2024-08-19

## 2024-08-19 NOTE — PROGRESS NOTES
Revlimid 10 mg chemo capsules reordered and sent to [facility] pharmacy. Prescriber survey completed and new auth #[id number] obtained through [employer] portal. Auth valid for 30 days and attached to RX.

## 2024-09-06 ENCOUNTER — PREP FOR PROCEDURE (OUTPATIENT)
Dept: ONCOLOGY | Age: 74
End: 2024-09-06

## 2024-09-06 DIAGNOSIS — C90.00 MULTIPLE MYELOMA NOT HAVING ACHIEVED REMISSION (HCC): Primary | ICD-10-CM

## 2024-09-06 RX ORDER — DIPHENOXYLATE HCL/ATROPINE 2.5-.025MG
1 TABLET ORAL 4 TIMES DAILY PRN
Qty: 120 TABLET | Refills: 0 | Status: SHIPPED | OUTPATIENT
Start: 2024-09-06 | End: 2024-10-06

## 2024-09-11 ENCOUNTER — CLINICAL DOCUMENTATION (OUTPATIENT)
Dept: ONCOLOGY | Age: 74
End: 2024-09-11

## 2024-09-11 ENCOUNTER — HOSPITAL ENCOUNTER (OUTPATIENT)
Dept: INFUSION THERAPY | Age: 74
Discharge: HOME | End: 2024-09-11
Payer: MEDICARE

## 2024-09-11 DIAGNOSIS — C90.00 MULTIPLE MYELOMA NOT HAVING ACHIEVED REMISSION (HCC): ICD-10-CM

## 2024-09-11 DIAGNOSIS — R53.82 CHRONIC FATIGUE: ICD-10-CM

## 2024-09-11 LAB
ALBUMIN SERPL-MCNC: 3.7 G/DL (ref 3.4–5)
ALBUMIN/GLOB SERPL: 1.4 {RATIO} (ref 1.1–2.2)
ALP SERPL-CCNC: 85 U/L (ref 40–129)
ALT SERPL-CCNC: 21 U/L (ref 10–40)
ANION GAP SERPL CALCULATED.3IONS-SCNC: 17 MMOL/L (ref 9–17)
AST SERPL-CCNC: 26 U/L (ref 15–37)
BASOPHILS # BLD: 0.03 K/UL
BASOPHILS NFR BLD: 1 % (ref 0–1)
BILIRUB SERPL-MCNC: 0.4 MG/DL (ref 0–1)
BUN SERPL-MCNC: 28 MG/DL (ref 7–20)
CALCIUM SERPL-MCNC: 8.1 MG/DL (ref 8.3–10.6)
CHLORIDE SERPL-SCNC: 108 MMOL/L (ref 99–110)
CO2 SERPL-SCNC: 18 MMOL/L (ref 21–32)
CREAT SERPL-MCNC: 1 MG/DL (ref 0.6–1.2)
EOSINOPHIL # BLD: 0.11 K/UL
EOSINOPHILS RELATIVE PERCENT: 4 % (ref 0–3)
ERYTHROCYTE [DISTWIDTH] IN BLOOD BY AUTOMATED COUNT: 17.5 % (ref 11.7–14.9)
ERYTHROCYTE [SEDIMENTATION RATE] IN BLOOD BY WESTERGREN METHOD: 38 MM/HR (ref 0–30)
GFR, ESTIMATED: 56 ML/MIN/1.73M2
GLUCOSE SERPL-MCNC: 82 MG/DL (ref 74–99)
HCT VFR BLD AUTO: 30.6 % (ref 37–47)
HGB BLD-MCNC: 9.3 G/DL (ref 12.5–16)
IGA SERPL-MCNC: 481 MG/DL (ref 70–400)
IGG SERPL-MCNC: 1272 MG/DL (ref 700–1600)
IGM SERPL-MCNC: 39 MG/DL (ref 40–230)
LDH SERPL-CCNC: 177 U/L (ref 100–190)
LYMPHOCYTES NFR BLD: 0.87 K/UL
LYMPHOCYTES RELATIVE PERCENT: 28 % (ref 24–44)
MCH RBC QN AUTO: 29.2 PG (ref 27–31)
MCHC RBC AUTO-ENTMCNC: 30.4 G/DL (ref 32–36)
MCV RBC AUTO: 95.9 FL (ref 78–100)
MONOCYTES NFR BLD: 0.48 K/UL
MONOCYTES NFR BLD: 15 % (ref 0–4)
NEUTROPHILS NFR BLD: 52 % (ref 36–66)
NEUTS SEG NFR BLD: 1.63 K/UL
PLATELET # BLD AUTO: 157 K/UL (ref 140–440)
PMV BLD AUTO: 10.6 FL (ref 7.5–11.1)
POTASSIUM SERPL-SCNC: 4.6 MMOL/L (ref 3.5–5.1)
PROT SERPL-MCNC: 6.4 G/DL (ref 6.4–8.2)
RBC # BLD AUTO: 3.19 M/UL (ref 4.2–5.4)
SODIUM SERPL-SCNC: 144 MMOL/L (ref 136–145)
WBC OTHER # BLD: 3.1 K/UL (ref 4–10.5)

## 2024-09-11 PROCEDURE — 36415 COLL VENOUS BLD VENIPUNCTURE: CPT

## 2024-09-11 PROCEDURE — 83615 LACTATE (LD) (LDH) ENZYME: CPT

## 2024-09-11 PROCEDURE — 80053 COMPREHEN METABOLIC PANEL: CPT

## 2024-09-11 PROCEDURE — 84165 PROTEIN E-PHORESIS SERUM: CPT

## 2024-09-11 PROCEDURE — 83521 IG LIGHT CHAINS FREE EACH: CPT

## 2024-09-11 PROCEDURE — 82784 ASSAY IGA/IGD/IGG/IGM EACH: CPT

## 2024-09-11 PROCEDURE — 86334 IMMUNOFIX E-PHORESIS SERUM: CPT

## 2024-09-11 PROCEDURE — 82232 ASSAY OF BETA-2 PROTEIN: CPT

## 2024-09-11 PROCEDURE — 85652 RBC SED RATE AUTOMATED: CPT

## 2024-09-11 PROCEDURE — 84155 ASSAY OF PROTEIN SERUM: CPT

## 2024-09-11 PROCEDURE — 85025 COMPLETE CBC W/AUTO DIFF WBC: CPT

## 2024-09-12 ENCOUNTER — CLINICAL DOCUMENTATION (OUTPATIENT)
Dept: ONCOLOGY | Age: 74
End: 2024-09-12

## 2024-09-12 RX ORDER — NITROFURANTOIN 25; 75 MG/1; MG/1
100 CAPSULE ORAL 2 TIMES DAILY
Qty: 20 CAPSULE | Refills: 0 | Status: SHIPPED | OUTPATIENT
Start: 2024-09-12 | End: 2024-09-22

## 2024-09-13 LAB
COMMENT: NORMAL
KAPPA FREE LIGHT CHAIN: NORMAL
KAPPA/LAMBDA RATIO: NORMAL
LAMBDA FREE LIGHT CHAIN: NORMAL

## 2024-09-14 LAB — BETA-2 MICROGLOBULIN: 3 MG/L

## 2024-09-15 LAB
ALBUMIN PERCENT: NORMAL %
ALBUMIN SERPL-MCNC: NORMAL G/DL
ALPHA 2 PERCENT: NORMAL %
ALPHA1 GLOB SERPL ELPH-MCNC: NORMAL %
ALPHA1 GLOB SERPL ELPH-MCNC: NORMAL G/DL
ALPHA2 GLOB SERPL ELPH-MCNC: NORMAL G/DL
B-GLOBULIN SERPL ELPH-MCNC: NORMAL %
B-GLOBULIN SERPL ELPH-MCNC: NORMAL G/DL
GAMMA GLOB SERPL ELPH-MCNC: NORMAL G/DL
GAMMA GLOBULIN %: NORMAL %
M PROTEIN 2 SERPL ELPH-MCNC: NORMAL G/DL
M PROTEIN SERPL ELPH-MCNC: NORMAL G/DL
PATHOLOGIST: NORMAL
PROT PATTERN SERPL ELPH-IMP: NORMAL
PROT SERPL-MCNC: 6.4 G/DL
TOTAL PROT. SUM,%: NORMAL %
TOTAL PROT. SUM: NORMAL G/DL

## 2024-09-16 LAB
ALBUMIN PERCENT: NORMAL % (ref 58–73)
ALBUMIN SERPL-MCNC: NORMAL G/DL (ref 4.2–5.5)
ALPHA 2 PERCENT: NORMAL % (ref 7–14)
ALPHA1 GLOB SERPL ELPH-MCNC: NORMAL % (ref 3–6)
ALPHA1 GLOB SERPL ELPH-MCNC: NORMAL G/DL (ref 0.1–0.3)
ALPHA2 GLOB SERPL ELPH-MCNC: NORMAL G/DL (ref 0.5–1.1)
B-GLOBULIN SERPL ELPH-MCNC: NORMAL % (ref 11–19)
B-GLOBULIN SERPL ELPH-MCNC: NORMAL G/DL (ref 0.4–0.9)
GAMMA GLOB SERPL ELPH-MCNC: NORMAL G/DL (ref 0.4–1.3)
GAMMA GLOBULIN %: NORMAL % (ref 9–20)
INTERPRETATION SERPL IFE-IMP: NORMAL
M PROTEIN 2 SERPL ELPH-MCNC: NORMAL G/DL
M PROTEIN SERPL ELPH-MCNC: NORMAL G/DL
PATH REV: NORMAL
PATHOLOGIST: NORMAL
PROT PATTERN SERPL ELPH-IMP: NORMAL
PROT SERPL-MCNC: NORMAL G/DL (ref 6.4–8.3)
TOTAL PROT. SUM,%: NORMAL % (ref 98–102)
TOTAL PROT. SUM: NORMAL G/DL (ref 5.7–8.2)

## 2024-09-17 DIAGNOSIS — C90.00 MULTIPLE MYELOMA NOT HAVING ACHIEVED REMISSION (HCC): ICD-10-CM

## 2024-09-17 LAB
COMMENT: ABNORMAL
KAPPA FREE LIGHT CHAIN: 76.7 MG/L (ref 2.37–20.73)
KAPPA/LAMBDA RATIO: 1.21 (ref 0.22–1.74)
LAMBDA FREE LIGHT CHAIN: 63.3 MG/L (ref 4.23–27.69)

## 2024-09-17 RX ORDER — METOPROLOL SUCCINATE 25 MG/1
25 TABLET, EXTENDED RELEASE ORAL DAILY
Qty: 90 TABLET | Refills: 0 | Status: SHIPPED | OUTPATIENT
Start: 2024-09-17 | End: 2024-09-18 | Stop reason: SDUPTHER

## 2024-09-18 ENCOUNTER — OFFICE VISIT (OUTPATIENT)
Dept: ONCOLOGY | Age: 74
End: 2024-09-18
Payer: MEDICARE

## 2024-09-18 ENCOUNTER — HOSPITAL ENCOUNTER (OUTPATIENT)
Dept: INFUSION THERAPY | Age: 74
Discharge: HOME | End: 2024-09-18
Payer: MEDICARE

## 2024-09-18 VITALS
BODY MASS INDEX: 29.49 KG/M2 | SYSTOLIC BLOOD PRESSURE: 178 MMHG | HEART RATE: 76 BPM | TEMPERATURE: 97.3 F | HEIGHT: 65 IN | WEIGHT: 177 LBS | DIASTOLIC BLOOD PRESSURE: 66 MMHG | OXYGEN SATURATION: 100 %

## 2024-09-18 DIAGNOSIS — C90.00 MULTIPLE MYELOMA NOT HAVING ACHIEVED REMISSION (HCC): Primary | ICD-10-CM

## 2024-09-18 PROCEDURE — G8427 DOCREV CUR MEDS BY ELIG CLIN: HCPCS | Performed by: INTERNAL MEDICINE

## 2024-09-18 PROCEDURE — 1123F ACP DISCUSS/DSCN MKR DOCD: CPT | Performed by: INTERNAL MEDICINE

## 2024-09-18 PROCEDURE — 3017F COLORECTAL CA SCREEN DOC REV: CPT | Performed by: INTERNAL MEDICINE

## 2024-09-18 PROCEDURE — 99214 OFFICE O/P EST MOD 30 MIN: CPT | Performed by: INTERNAL MEDICINE

## 2024-09-18 PROCEDURE — 99211 OFF/OP EST MAY X REQ PHY/QHP: CPT

## 2024-09-18 PROCEDURE — 1036F TOBACCO NON-USER: CPT | Performed by: INTERNAL MEDICINE

## 2024-09-18 PROCEDURE — G8417 CALC BMI ABV UP PARAM F/U: HCPCS | Performed by: INTERNAL MEDICINE

## 2024-09-18 PROCEDURE — 1090F PRES/ABSN URINE INCON ASSESS: CPT | Performed by: INTERNAL MEDICINE

## 2024-09-18 PROCEDURE — G8400 PT W/DXA NO RESULTS DOC: HCPCS | Performed by: INTERNAL MEDICINE

## 2024-09-18 RX ORDER — METOPROLOL SUCCINATE 25 MG/1
25 TABLET, EXTENDED RELEASE ORAL DAILY
Qty: 90 TABLET | Refills: 1 | Status: SHIPPED | OUTPATIENT
Start: 2024-09-18

## 2024-09-19 LAB
MISCELLANEOUS LAB TEST RESULT: ABNORMAL
TEST NAME: ABNORMAL

## 2024-09-20 DIAGNOSIS — C90.00 MULTIPLE MYELOMA NOT HAVING ACHIEVED REMISSION (HCC): ICD-10-CM

## 2024-09-20 LAB
MISCELLANEOUS LAB TEST RESULT: NORMAL
TEST NAME: NORMAL

## 2024-09-20 RX ORDER — LENALIDOMIDE 10 MG/1
10 CAPSULE ORAL DAILY
Qty: 28 CAPSULE | Refills: 0 | Status: ACTIVE | OUTPATIENT
Start: 2024-09-20

## 2024-10-16 ENCOUNTER — CLINICAL DOCUMENTATION (OUTPATIENT)
Dept: ONCOLOGY | Age: 74
End: 2024-10-16

## 2024-10-16 DIAGNOSIS — C90.00 MULTIPLE MYELOMA NOT HAVING ACHIEVED REMISSION (HCC): ICD-10-CM

## 2024-10-16 RX ORDER — LENALIDOMIDE 10 MG/1
10 CAPSULE ORAL DAILY
Qty: 28 CAPSULE | Refills: 0 | Status: ACTIVE | OUTPATIENT
Start: 2024-10-16

## 2024-10-16 NOTE — PROGRESS NOTES
Revlimid 10 mg chemo capsules reordered and sent to [facility] pharmacy. Prescriber survey completed and new auth # [id number]  obtained through [employer] portal. Auth valid for 30 days and attached to RX.

## 2024-11-17 DIAGNOSIS — C90.00 MULTIPLE MYELOMA NOT HAVING ACHIEVED REMISSION (HCC): ICD-10-CM

## 2024-11-18 ENCOUNTER — CLINICAL DOCUMENTATION (OUTPATIENT)
Dept: ONCOLOGY | Age: 74
End: 2024-11-18

## 2024-11-18 RX ORDER — LENALIDOMIDE 10 MG/1
10 CAPSULE ORAL DAILY
Qty: 28 CAPSULE | Refills: 0 | Status: ACTIVE | OUTPATIENT
Start: 2024-11-18

## 2024-11-18 NOTE — PROGRESS NOTES
Revlimid 10 mg chemo capsules reordered and sent to [facility] pharmacy. Prescriber survey completed and new auth # [id number]  obtained through [employer] portal. Auth valid for 30 days and attached to RX.

## 2024-12-11 ENCOUNTER — HOSPITAL ENCOUNTER (OUTPATIENT)
Dept: INFUSION THERAPY | Age: 74
Discharge: HOME | End: 2024-12-11
Payer: MEDICARE

## 2024-12-11 DIAGNOSIS — C90.00 MULTIPLE MYELOMA NOT HAVING ACHIEVED REMISSION (HCC): ICD-10-CM

## 2024-12-11 LAB
ALBUMIN SERPL-MCNC: 3.4 G/DL (ref 3.4–5)
ALBUMIN/GLOB SERPL: 1.4 {RATIO} (ref 1.1–2.2)
ALP SERPL-CCNC: 71 U/L (ref 40–129)
ALT SERPL-CCNC: 21 U/L (ref 10–40)
ANION GAP SERPL CALCULATED.3IONS-SCNC: 7 MMOL/L (ref 9–17)
AST SERPL-CCNC: 26 U/L (ref 15–37)
BASOPHILS # BLD: 0.02 K/UL
BASOPHILS NFR BLD: 1 % (ref 0–1)
BILIRUB SERPL-MCNC: 0.4 MG/DL (ref 0–1)
BUN SERPL-MCNC: 20 MG/DL (ref 7–20)
CALCIUM SERPL-MCNC: 8.4 MG/DL (ref 8.3–10.6)
CHLORIDE SERPL-SCNC: 107 MMOL/L (ref 99–110)
CO2 SERPL-SCNC: 27 MMOL/L (ref 21–32)
CREAT SERPL-MCNC: 1 MG/DL (ref 0.6–1.2)
EOSINOPHIL # BLD: 0.1 K/UL
EOSINOPHILS RELATIVE PERCENT: 3 % (ref 0–3)
ERYTHROCYTE [DISTWIDTH] IN BLOOD BY AUTOMATED COUNT: 17.5 % (ref 11.7–14.9)
ERYTHROCYTE [SEDIMENTATION RATE] IN BLOOD BY WESTERGREN METHOD: 26 MM/HR (ref 0–30)
GFR, ESTIMATED: 53 ML/MIN/1.73M2
GLUCOSE SERPL-MCNC: 80 MG/DL (ref 74–99)
HCT VFR BLD AUTO: 29.4 % (ref 37–47)
HGB BLD-MCNC: 9.2 G/DL (ref 12.5–16)
IGA SERPL-MCNC: 488 MG/DL (ref 70–400)
IGG SERPL-MCNC: 1113 MG/DL (ref 700–1600)
IGM SERPL-MCNC: 31 MG/DL (ref 40–230)
LDH SERPL-CCNC: 182 U/L (ref 100–190)
LYMPHOCYTES NFR BLD: 0.95 K/UL
LYMPHOCYTES RELATIVE PERCENT: 32 % (ref 24–44)
MCH RBC QN AUTO: 29.7 PG (ref 27–31)
MCHC RBC AUTO-ENTMCNC: 31.3 G/DL (ref 32–36)
MCV RBC AUTO: 94.8 FL (ref 78–100)
MONOCYTES NFR BLD: 0.45 K/UL
MONOCYTES NFR BLD: 15 % (ref 0–4)
NEUTROPHILS NFR BLD: 48 % (ref 36–66)
NEUTS SEG NFR BLD: 1.41 K/UL
PLATELET # BLD AUTO: 163 K/UL (ref 140–440)
PMV BLD AUTO: 10.5 FL (ref 7.5–11.1)
POTASSIUM SERPL-SCNC: 4.4 MMOL/L (ref 3.5–5.1)
PROT SERPL-MCNC: 5.9 G/DL (ref 6.4–8.2)
RBC # BLD AUTO: 3.1 M/UL (ref 4.2–5.4)
SODIUM SERPL-SCNC: 141 MMOL/L (ref 136–145)
WBC OTHER # BLD: 2.9 K/UL (ref 4–10.5)

## 2024-12-11 PROCEDURE — 82784 ASSAY IGA/IGD/IGG/IGM EACH: CPT

## 2024-12-11 PROCEDURE — 85025 COMPLETE CBC W/AUTO DIFF WBC: CPT

## 2024-12-11 PROCEDURE — 84155 ASSAY OF PROTEIN SERUM: CPT

## 2024-12-11 PROCEDURE — 36415 COLL VENOUS BLD VENIPUNCTURE: CPT

## 2024-12-11 PROCEDURE — 85652 RBC SED RATE AUTOMATED: CPT

## 2024-12-11 PROCEDURE — 83615 LACTATE (LD) (LDH) ENZYME: CPT

## 2024-12-11 PROCEDURE — 86334 IMMUNOFIX E-PHORESIS SERUM: CPT

## 2024-12-11 PROCEDURE — 80053 COMPREHEN METABOLIC PANEL: CPT

## 2024-12-11 PROCEDURE — 84165 PROTEIN E-PHORESIS SERUM: CPT

## 2024-12-11 PROCEDURE — 82232 ASSAY OF BETA-2 PROTEIN: CPT

## 2024-12-11 PROCEDURE — 83521 IG LIGHT CHAINS FREE EACH: CPT

## 2024-12-11 RX ORDER — METOPROLOL SUCCINATE 25 MG/1
25 TABLET, EXTENDED RELEASE ORAL DAILY
Qty: 90 TABLET | Refills: 1 | Status: SHIPPED | OUTPATIENT
Start: 2024-12-11

## 2024-12-11 NOTE — TELEPHONE ENCOUNTER
Patient requesting a refill for Metoprolol to be sent to [facility] on [address] [facility]. Pending RX to Provider to be sent to pharmacy.

## 2024-12-13 LAB
COMMENT: ABNORMAL
KAPPA FREE LIGHT CHAIN: 82 MG/L (ref 2.37–20.73)
KAPPA/LAMBDA RATIO: 1.13 (ref 0.22–1.74)
LAMBDA FREE LIGHT CHAIN: 72.3 MG/L (ref 4.23–27.69)

## 2024-12-14 LAB — BETA-2 MICROGLOBULIN: 3.2 MG/L

## 2024-12-15 DIAGNOSIS — C90.00 MULTIPLE MYELOMA NOT HAVING ACHIEVED REMISSION (HCC): ICD-10-CM

## 2024-12-16 ENCOUNTER — CLINICAL DOCUMENTATION (OUTPATIENT)
Dept: ONCOLOGY | Age: 74
End: 2024-12-16

## 2024-12-16 RX ORDER — LENALIDOMIDE 10 MG/1
10 CAPSULE ORAL DAILY
Qty: 28 CAPSULE | Refills: 0 | Status: ACTIVE | OUTPATIENT
Start: 2024-12-16

## 2024-12-16 NOTE — PROGRESS NOTES
Revlimid 10 mg chemo capsules reordered and e-scribed to [facility] pharmacy. Prescriber survey completed and new auth # [id number] obtained through [employer] portal. Auth valid for 30 days and attached to RX.

## 2024-12-17 ENCOUNTER — HOSPITAL ENCOUNTER (EMERGENCY)
Age: 74
Discharge: HOME | End: 2024-12-17
Attending: EMERGENCY MEDICINE
Payer: MEDICARE

## 2024-12-17 ENCOUNTER — APPOINTMENT (OUTPATIENT)
Dept: CT IMAGING | Age: 74
End: 2024-12-17
Attending: EMERGENCY MEDICINE
Payer: MEDICARE

## 2024-12-17 VITALS
HEIGHT: 66 IN | DIASTOLIC BLOOD PRESSURE: 59 MMHG | SYSTOLIC BLOOD PRESSURE: 161 MMHG | HEART RATE: 56 BPM | OXYGEN SATURATION: 100 % | TEMPERATURE: 98 F | WEIGHT: 172 LBS | RESPIRATION RATE: 16 BRPM | BODY MASS INDEX: 27.64 KG/M2

## 2024-12-17 DIAGNOSIS — S01.81XA FACIAL LACERATION, INITIAL ENCOUNTER: ICD-10-CM

## 2024-12-17 DIAGNOSIS — S09.93XA BLUNT TRAUMA OF FACE, INITIAL ENCOUNTER: Primary | ICD-10-CM

## 2024-12-17 DIAGNOSIS — S02.2XXA CLOSED FRACTURE OF NASAL BONE, INITIAL ENCOUNTER: ICD-10-CM

## 2024-12-17 PROCEDURE — 99284 EMERGENCY DEPT VISIT MOD MDM: CPT

## 2024-12-17 PROCEDURE — 12011 RPR F/E/E/N/L/M 2.5 CM/<: CPT

## 2024-12-17 PROCEDURE — 96372 THER/PROPH/DIAG INJ SC/IM: CPT

## 2024-12-17 PROCEDURE — 70486 CT MAXILLOFACIAL W/O DYE: CPT

## 2024-12-17 PROCEDURE — 2500000003 HC RX 250 WO HCPCS: Performed by: EMERGENCY MEDICINE

## 2024-12-17 PROCEDURE — 6370000000 HC RX 637 (ALT 250 FOR IP): Performed by: EMERGENCY MEDICINE

## 2024-12-17 PROCEDURE — 6360000002 HC RX W HCPCS: Performed by: EMERGENCY MEDICINE

## 2024-12-17 RX ORDER — SODIUM BICARBONATE 42 MG/ML
5 INJECTION, SOLUTION INTRAVENOUS ONCE
Status: COMPLETED | OUTPATIENT
Start: 2024-12-17 | End: 2024-12-17

## 2024-12-17 RX ORDER — CEPHALEXIN 500 MG/1
500 CAPSULE ORAL ONCE
Status: COMPLETED | OUTPATIENT
Start: 2024-12-17 | End: 2024-12-17

## 2024-12-17 RX ORDER — CEPHALEXIN 500 MG/1
500 CAPSULE ORAL 2 TIMES DAILY
Qty: 14 CAPSULE | Refills: 0 | Status: SHIPPED | OUTPATIENT
Start: 2024-12-17 | End: 2024-12-24

## 2024-12-17 RX ORDER — ACETAMINOPHEN 325 MG/1
650 TABLET ORAL ONCE
Status: COMPLETED | OUTPATIENT
Start: 2024-12-17 | End: 2024-12-17

## 2024-12-17 RX ORDER — LIDOCAINE HYDROCHLORIDE 10 MG/ML
5 INJECTION, SOLUTION EPIDURAL; INFILTRATION; INTRACAUDAL; PERINEURAL ONCE
Status: COMPLETED | OUTPATIENT
Start: 2024-12-17 | End: 2024-12-17

## 2024-12-17 RX ADMIN — SODIUM BICARBONATE 5 MEQ: 42 INJECTION, SOLUTION INTRAVENOUS at 07:20

## 2024-12-17 RX ADMIN — ACETAMINOPHEN 650 MG: 325 TABLET ORAL at 07:21

## 2024-12-17 RX ADMIN — LIDOCAINE HYDROCHLORIDE 5 ML: 10 INJECTION, SOLUTION EPIDURAL; INFILTRATION; INTRACAUDAL; PERINEURAL at 07:20

## 2024-12-17 RX ADMIN — CEPHALEXIN 500 MG: 500 CAPSULE ORAL at 08:34

## 2024-12-17 ASSESSMENT — PAIN - FUNCTIONAL ASSESSMENT: PAIN_FUNCTIONAL_ASSESSMENT: NONE - DENIES PAIN

## 2024-12-17 NOTE — ED PROVIDER NOTES
Medication List        START taking these medications    Details   cephALEXin (KEFLEX) 500 MG capsule Take 1 capsule by mouth 2 times daily for 7 days  Qty: 14 capsule, Refills: 0                 Sammy Beard DO, FACEP      Comment: Please note this report has been produced using speech recognition software and maycontain errors related to that system including errors in grammar, punctuation, and spelling, as well as words and phrases that may be inappropriate. If there are any questions or concerns please feel free to contact thedictating provider for clarification.        Sammy Beard DO  12/17/24 0737

## 2024-12-18 ENCOUNTER — HOSPITAL ENCOUNTER (OUTPATIENT)
Dept: INFUSION THERAPY | Age: 74
Discharge: HOME | End: 2024-12-18
Payer: MEDICARE

## 2024-12-18 ENCOUNTER — OFFICE VISIT (OUTPATIENT)
Dept: ONCOLOGY | Age: 74
End: 2024-12-18
Payer: MEDICARE

## 2024-12-18 VITALS
HEIGHT: 66 IN | HEART RATE: 57 BPM | OXYGEN SATURATION: 100 % | TEMPERATURE: 97.7 F | DIASTOLIC BLOOD PRESSURE: 62 MMHG | RESPIRATION RATE: 16 BRPM | BODY MASS INDEX: 29.63 KG/M2 | SYSTOLIC BLOOD PRESSURE: 166 MMHG | WEIGHT: 184.4 LBS

## 2024-12-18 DIAGNOSIS — C90.00 MULTIPLE MYELOMA NOT HAVING ACHIEVED REMISSION (HCC): Primary | ICD-10-CM

## 2024-12-18 PROCEDURE — 1123F ACP DISCUSS/DSCN MKR DOCD: CPT | Performed by: INTERNAL MEDICINE

## 2024-12-18 PROCEDURE — 1090F PRES/ABSN URINE INCON ASSESS: CPT | Performed by: INTERNAL MEDICINE

## 2024-12-18 PROCEDURE — 1036F TOBACCO NON-USER: CPT | Performed by: INTERNAL MEDICINE

## 2024-12-18 PROCEDURE — G8484 FLU IMMUNIZE NO ADMIN: HCPCS | Performed by: INTERNAL MEDICINE

## 2024-12-18 PROCEDURE — 3017F COLORECTAL CA SCREEN DOC REV: CPT | Performed by: INTERNAL MEDICINE

## 2024-12-18 PROCEDURE — G8427 DOCREV CUR MEDS BY ELIG CLIN: HCPCS | Performed by: INTERNAL MEDICINE

## 2024-12-18 PROCEDURE — G8400 PT W/DXA NO RESULTS DOC: HCPCS | Performed by: INTERNAL MEDICINE

## 2024-12-18 PROCEDURE — 1126F AMNT PAIN NOTED NONE PRSNT: CPT | Performed by: INTERNAL MEDICINE

## 2024-12-18 PROCEDURE — 99211 OFF/OP EST MAY X REQ PHY/QHP: CPT

## 2024-12-18 PROCEDURE — G8417 CALC BMI ABV UP PARAM F/U: HCPCS | Performed by: INTERNAL MEDICINE

## 2024-12-18 PROCEDURE — 99214 OFFICE O/P EST MOD 30 MIN: CPT | Performed by: INTERNAL MEDICINE

## 2024-12-18 PROCEDURE — 1159F MED LIST DOCD IN RCRD: CPT | Performed by: INTERNAL MEDICINE

## 2024-12-18 NOTE — PROGRESS NOTES
MA Rooming Questions  Patient: Marcia Bar  MRN: [id number]    Date: 12/18/2024        1. Do you have any new issues?   yes - fell yesterday at home         2. Do you need any refills on medications?    no    3. Have you had any imaging done since your last visit?   yes - xrays and scans at [facility] for a fall    4. Have you been hospitalized or seen in the emergency room since your last visit here?   yes - [facility] d/t a fall    5. Did the patient have a depression screening completed today? No    No data recorded     PHQ-9 Given to (if applicable):               PHQ-9 Score (if applicable):                     [] Positive     []  Negative              Does question #9 need addressed (if applicable)                     [] Yes    []  No               Gabriela Trejo MA      
observation.      She noticed increase in size of her upper sternum lesion. It was decreased and back to normal size after chemo in 2016.    I recognize that her serum lambda light chain has gradually increased over time. In view of her enlarging upper sternum lesion along with increasing lambda light chain, I believe she has recurrent multiple myeloma.    I offered her to have repeat scans and bone marrow biopsy to confirm progression of the disease.  However, she does not want to have repeat scans because it exacerbates her vitiligo.  She also does not want to have biopsy at this moment.    However, she agrees to start chemotherapy with Velcade, Revlimid and dexamethasone.  Since her laboratory work-ups and clinical findings are consistent with recurrent multiple myeloma, we started first-line chemotherapy on July 7, 2021. Since she has been in remission for more than 4 years, we decided to rechallenge with velcade, revlimid and dexamethason.      We stopped velcade after 9/8/2021 therapy since she could not tolerate it well. We decided to continue with revlimid only since then.     We changed revlimid dose to 10 mg 21 days on 7 days off schedule starting from 11/25/23 due to significant myelosuppression with continuous therapy. We also stopped dexamethasone.     She is here for close monitoring of side effects and toxicity from chemotherapy. She is tolerating current chemo well.     I recognize that her upper sternal mass is decreasing in size. I recognize that her serum light chains were mildly elevated on 12/11//24 blood test, but k/l ratio was normal. Her serum creatinine was 1.0 mg/dl on 12/11/24. SPEP/KAUSHIK is still pending and I will f/u with the results.     If results are stable, I will repeat multiple myeloma work-ups again in 3 months and I will follow-up with the results.    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     Revlimid induced diarrhea - not well controlled by imodium.

## 2024-12-21 LAB
MISCELLANEOUS LAB TEST RESULT: ABNORMAL
MISCELLANEOUS LAB TEST RESULT: NORMAL
TEST NAME: ABNORMAL
TEST NAME: NORMAL

## 2025-01-08 ENCOUNTER — PREP FOR PROCEDURE (OUTPATIENT)
Dept: ONCOLOGY | Age: 75
End: 2025-01-08

## 2025-01-08 ENCOUNTER — CLINICAL DOCUMENTATION (OUTPATIENT)
Dept: ONCOLOGY | Age: 75
End: 2025-01-08

## 2025-01-08 ENCOUNTER — HOSPITAL ENCOUNTER (OUTPATIENT)
Dept: INFUSION THERAPY | Age: 75
Discharge: HOME | End: 2025-01-08
Payer: MEDICARE

## 2025-01-08 DIAGNOSIS — C90.00 MULTIPLE MYELOMA NOT HAVING ACHIEVED REMISSION (HCC): ICD-10-CM

## 2025-01-08 DIAGNOSIS — C90.00 MULTIPLE MYELOMA NOT HAVING ACHIEVED REMISSION (HCC): Primary | ICD-10-CM

## 2025-01-08 LAB
ALBUMIN SERPL-MCNC: 3.7 G/DL (ref 3.4–5)
ALBUMIN/GLOB SERPL: 1.3 {RATIO} (ref 1.1–2.2)
ALP SERPL-CCNC: 87 U/L (ref 40–129)
ALT SERPL-CCNC: 20 U/L (ref 10–40)
ANION GAP SERPL CALCULATED.3IONS-SCNC: 9 MMOL/L (ref 9–17)
AST SERPL-CCNC: 23 U/L (ref 15–37)
BASOPHILS # BLD: 0.1 K/UL
BASOPHILS NFR BLD: 4 % (ref 0–1)
BILIRUB SERPL-MCNC: 0.4 MG/DL (ref 0–1)
BUN SERPL-MCNC: 26 MG/DL (ref 7–20)
CALCIUM SERPL-MCNC: 9.2 MG/DL (ref 8.3–10.6)
CHLORIDE SERPL-SCNC: 106 MMOL/L (ref 99–110)
CO2 SERPL-SCNC: 28 MMOL/L (ref 21–32)
CREAT SERPL-MCNC: 1.1 MG/DL (ref 0.6–1.2)
EOSINOPHIL # BLD: 0.23 K/UL
EOSINOPHILS RELATIVE PERCENT: 9 % (ref 0–3)
ERYTHROCYTE [DISTWIDTH] IN BLOOD BY AUTOMATED COUNT: 16.8 % (ref 11.7–14.9)
GFR, ESTIMATED: 50 ML/MIN/1.73M2
GLUCOSE SERPL-MCNC: 105 MG/DL (ref 74–99)
HCT VFR BLD AUTO: 31.2 % (ref 37–47)
HGB BLD-MCNC: 9.7 G/DL (ref 12.5–16)
LYMPHOCYTES NFR BLD: 0.71 K/UL
LYMPHOCYTES RELATIVE PERCENT: 27 % (ref 24–44)
MCH RBC QN AUTO: 29.1 PG (ref 27–31)
MCHC RBC AUTO-ENTMCNC: 31.1 G/DL (ref 32–36)
MCV RBC AUTO: 93.7 FL (ref 78–100)
MONOCYTES NFR BLD: 0.33 K/UL
MONOCYTES NFR BLD: 12 % (ref 0–4)
NEUTROPHILS NFR BLD: 49 % (ref 36–66)
NEUTS SEG NFR BLD: 1.31 K/UL
PLATELET # BLD AUTO: 184 K/UL (ref 140–440)
PMV BLD AUTO: 8.8 FL (ref 7.5–11.1)
POTASSIUM SERPL-SCNC: 4.3 MMOL/L (ref 3.5–5.1)
PROT SERPL-MCNC: 6.5 G/DL (ref 6.4–8.2)
RBC # BLD AUTO: 3.33 M/UL (ref 4.2–5.4)
SODIUM SERPL-SCNC: 143 MMOL/L (ref 136–145)
WBC OTHER # BLD: 2.7 K/UL (ref 4–10.5)

## 2025-01-08 PROCEDURE — 80053 COMPREHEN METABOLIC PANEL: CPT

## 2025-01-08 PROCEDURE — 36415 COLL VENOUS BLD VENIPUNCTURE: CPT

## 2025-01-08 PROCEDURE — 85025 COMPLETE CBC W/AUTO DIFF WBC: CPT

## 2025-01-08 NOTE — PROGRESS NOTES
Received VM x3 from patient regarding upcoming dental procedure and inquiring what medication to hold. Physician notified and advised that he has already spoke with patient and instructed to hold Revlimid for 7-10. Physician also instructed patient to call office if PO ATB needed. Called patient @ [phone] to confirm. Patient is having local extraction with Dr. Kell DDS tomorrow 01/08/205 and requesting that letter be sent stating ok to proceed with procedure. Called DDS office @ [phone] to inquire where to send clearance. Advised to e-mail to [email or web address]    Correspondence sent as requested.

## 2025-01-08 NOTE — PROGRESS NOTES
Physician requesting that patient come to clinic for CBC to confirm blood work ok for dental procedure. Called patient @ [phone] to notify. Patient voices understanding and added to lab schedule today 01/08/2025 at 1100.

## 2025-01-13 ENCOUNTER — CLINICAL DOCUMENTATION (OUTPATIENT)
Age: 75
End: 2025-01-13

## 2025-01-13 ENCOUNTER — CLINICAL DOCUMENTATION (OUTPATIENT)
Dept: ONCOLOGY | Age: 75
End: 2025-01-13

## 2025-01-13 NOTE — PROGRESS NOTES
Received VM from patient stating she got a call inquiring if she would want to start going to [facility] for tx. Patient states she would prefer to stay at [facility]. Patient [place] outskirts of [place] and [place] to [place]. Patient hoping she is not being forced to go to [place]. Called patient back @ [phone] and assured patient that she could continue to come to [facility]. Voices understanding. Denies further needs at this time.

## 2025-01-22 ENCOUNTER — CLINICAL DOCUMENTATION (OUTPATIENT)
Dept: ONCOLOGY | Age: 75
End: 2025-01-22

## 2025-01-22 DIAGNOSIS — C90.00 MULTIPLE MYELOMA NOT HAVING ACHIEVED REMISSION (HCC): ICD-10-CM

## 2025-01-22 RX ORDER — LENALIDOMIDE 10 MG/1
10 CAPSULE ORAL DAILY
Qty: 28 CAPSULE | Refills: 0 | Status: ACTIVE | OUTPATIENT
Start: 2025-01-22

## 2025-01-22 NOTE — PROGRESS NOTES
Revlimid 10 mg chemo capsules reordered and sent to [facility] pharmacy. Prescriber survey completed and new auth # [id number]  obtained through [employer] portal. Auth valid for 30 days and attached to RX.

## 2025-02-10 NOTE — PROGRESS NOTES
This nurse received an order for Augmentin 875 mg BID for 10 days from the physician. RX e-scribed to [facility], this nurse attempted to call the patient @ [phone] to advise her of the prescription. However there was no answer, this nurse left a Vm advising oft he prescription and this RN direct number provided should she have additional questions.

## 2025-02-11 DIAGNOSIS — C90.00 MULTIPLE MYELOMA NOT HAVING ACHIEVED REMISSION (HCC): Primary | ICD-10-CM

## 2025-02-11 RX ORDER — ALBUTEROL SULFATE 90 UG/1
4 INHALANT RESPIRATORY (INHALATION) PRN
OUTPATIENT
Start: 2025-02-12

## 2025-02-11 RX ORDER — SODIUM CHLORIDE 0.9 % (FLUSH) 0.9 %
5-40 SYRINGE (ML) INJECTION PRN
OUTPATIENT
Start: 2025-02-12

## 2025-02-11 RX ORDER — ACETAMINOPHEN 325 MG/1
650 TABLET ORAL
OUTPATIENT
Start: 2025-02-12

## 2025-02-11 RX ORDER — HEPARIN 100 UNIT/ML
500 SYRINGE INTRAVENOUS PRN
OUTPATIENT
Start: 2025-02-12

## 2025-02-11 RX ORDER — ONDANSETRON 2 MG/ML
8 INJECTION INTRAMUSCULAR; INTRAVENOUS
OUTPATIENT
Start: 2025-02-12

## 2025-02-11 RX ORDER — SODIUM CHLORIDE 9 MG/ML
5-250 INJECTION, SOLUTION INTRAVENOUS PRN
OUTPATIENT
Start: 2025-02-12

## 2025-02-11 RX ORDER — SODIUM CHLORIDE 9 MG/ML
INJECTION, SOLUTION INTRAVENOUS CONTINUOUS
OUTPATIENT
Start: 2025-02-12

## 2025-02-11 RX ORDER — FAMOTIDINE 10 MG/ML
20 INJECTION, SOLUTION INTRAVENOUS
OUTPATIENT
Start: 2025-02-12

## 2025-02-11 RX ORDER — DIPHENHYDRAMINE HYDROCHLORIDE 50 MG/ML
50 INJECTION INTRAMUSCULAR; INTRAVENOUS
OUTPATIENT
Start: 2025-02-12

## 2025-02-11 RX ORDER — MEPERIDINE HYDROCHLORIDE 25 MG/ML
12.5 INJECTION INTRAMUSCULAR; INTRAVENOUS; SUBCUTANEOUS PRN
OUTPATIENT
Start: 2025-02-12

## 2025-02-11 RX ORDER — HYDROCORTISONE SODIUM SUCCINATE 100 MG/2ML
100 INJECTION INTRAMUSCULAR; INTRAVENOUS
OUTPATIENT
Start: 2025-02-12

## 2025-02-11 RX ORDER — EPINEPHRINE 1 MG/ML
0.3 INJECTION, SOLUTION, CONCENTRATE INTRAVENOUS PRN
OUTPATIENT
Start: 2025-02-12

## 2025-02-12 ENCOUNTER — CLINICAL DOCUMENTATION (OUTPATIENT)
Dept: INFUSION THERAPY | Age: 75
End: 2025-02-12

## 2025-02-12 NOTE — PROGRESS NOTES
Pt called back by this RN to offer fluids since she was not feeling well. Pt declined fluids stating she is drinking 3-4 40 ounce bottles of water per day and is eating well. Pt instructed if she felt like she would need fluids to call office. Pt asked if she could take Zinc she is not feeling well pt instructed that would be ok to take. Pt instructed to call back with any further concerns or issues.

## 2025-02-18 ENCOUNTER — CLINICAL DOCUMENTATION (OUTPATIENT)
Dept: ONCOLOGY | Age: 75
End: 2025-02-18

## 2025-02-18 DIAGNOSIS — C90.00 MULTIPLE MYELOMA NOT HAVING ACHIEVED REMISSION (HCC): ICD-10-CM

## 2025-02-18 RX ORDER — LENALIDOMIDE 10 MG/1
10 CAPSULE ORAL DAILY
Qty: 28 CAPSULE | Refills: 0 | Status: ACTIVE | OUTPATIENT
Start: 2025-02-18

## 2025-02-18 NOTE — PROGRESS NOTES
Revlimid 10 mg chemo capsules reordered and e-scribed to [facility] pharmacy. Prescriber survey completed and new auth # [id number] obtained through [employer] portal. Auth valid for 30 days and attached to RX.

## 2025-02-26 ENCOUNTER — HOSPITAL ENCOUNTER (OUTPATIENT)
Dept: INFUSION THERAPY | Age: 75
Discharge: HOME | End: 2025-02-26
Payer: MEDICARE

## 2025-02-26 VITALS
OXYGEN SATURATION: 97 % | HEIGHT: 66 IN | BODY MASS INDEX: 28.93 KG/M2 | TEMPERATURE: 97.5 F | SYSTOLIC BLOOD PRESSURE: 166 MMHG | HEART RATE: 60 BPM | WEIGHT: 180 LBS | RESPIRATION RATE: 18 BRPM | DIASTOLIC BLOOD PRESSURE: 64 MMHG

## 2025-02-26 DIAGNOSIS — C90.00 MULTIPLE MYELOMA NOT HAVING ACHIEVED REMISSION (HCC): Primary | ICD-10-CM

## 2025-02-26 LAB
ALBUMIN SERPL-MCNC: 3.3 G/DL (ref 3.4–5)
ALBUMIN/GLOB SERPL: 1.1 {RATIO} (ref 1.1–2.2)
ALP SERPL-CCNC: 91 U/L (ref 40–129)
ALT SERPL-CCNC: 14 U/L (ref 10–40)
ANION GAP SERPL CALCULATED.3IONS-SCNC: 9 MMOL/L (ref 9–17)
AST SERPL-CCNC: 20 U/L (ref 15–37)
BASOPHILS # BLD: 0.02 K/UL
BASOPHILS NFR BLD: 1 % (ref 0–1)
BILIRUB SERPL-MCNC: 0.5 MG/DL (ref 0–1)
BUN BLD-MCNC: 26 MG/DL (ref 7–20)
BUN SERPL-MCNC: 26 MG/DL (ref 7–20)
CA-I BLD-SCNC: 1.24 MMOL/L (ref 1.15–1.33)
CALCIUM SERPL-MCNC: 9.1 MG/DL (ref 8.3–10.6)
CHLORIDE BLD-SCNC: 104 MMOL/L (ref 99–110)
CHLORIDE SERPL-SCNC: 104 MMOL/L (ref 99–110)
CO2 BLD CALC-SCNC: 27 MMOL/L (ref 21–32)
CO2 SERPL-SCNC: 26 MMOL/L (ref 21–32)
CREAT BLD-MCNC: 1 MG/DL (ref 0.5–1.2)
CREAT SERPL-MCNC: 0.9 MG/DL (ref 0.6–1.2)
EGFR, POC: 60 ML/MIN/1.73M2
EOSINOPHIL # BLD: 0.25 K/UL
EOSINOPHILS RELATIVE PERCENT: 7 % (ref 0–3)
ERYTHROCYTE [DISTWIDTH] IN BLOOD BY AUTOMATED COUNT: 17.5 % (ref 11.7–14.9)
GFR, ESTIMATED: 61 ML/MIN/1.73M2
GLUCOSE BLD-MCNC: 86 MG/DL (ref 74–99)
GLUCOSE SERPL-MCNC: 87 MG/DL (ref 74–99)
HCT VFR BLD AUTO: 28.7 % (ref 37–47)
HGB BLD-MCNC: 9 G/DL (ref 12.5–16)
LYMPHOCYTES NFR BLD: 0.74 K/UL
LYMPHOCYTES RELATIVE PERCENT: 21 % (ref 24–44)
MAGNESIUM SERPL-MCNC: 1.9 MG/DL (ref 1.8–2.4)
MCH RBC QN AUTO: 28.6 PG (ref 27–31)
MCHC RBC AUTO-ENTMCNC: 31.4 G/DL (ref 32–36)
MCV RBC AUTO: 91.1 FL (ref 78–100)
MONOCYTES NFR BLD: 0.45 K/UL
MONOCYTES NFR BLD: 13 % (ref 0–4)
NEUTROPHILS NFR BLD: 58 % (ref 36–66)
NEUTS SEG NFR BLD: 2.02 K/UL
PHOSPHATE SERPL-MCNC: 3.5 MG/DL (ref 2.5–4.9)
PLATELET # BLD AUTO: 140 K/UL (ref 140–440)
PMV BLD AUTO: 11.1 FL (ref 7.5–11.1)
POTASSIUM BLD-SCNC: 4.3 MMOL/L (ref 3.5–5.1)
POTASSIUM SERPL-SCNC: 4.3 MMOL/L (ref 3.5–5.1)
PROT SERPL-MCNC: 6.4 G/DL (ref 6.4–8.2)
RBC # BLD AUTO: 3.15 M/UL (ref 4.2–5.4)
SODIUM BLD-SCNC: 142 MMOL/L (ref 136–145)
SODIUM SERPL-SCNC: 139 MMOL/L (ref 136–145)
WBC OTHER # BLD: 3.5 K/UL (ref 4–10.5)

## 2025-02-26 PROCEDURE — 2500000003 HC RX 250 WO HCPCS: Performed by: INTERNAL MEDICINE

## 2025-02-26 PROCEDURE — 2580000003 HC RX 258: Performed by: INTERNAL MEDICINE

## 2025-02-26 PROCEDURE — 85025 COMPLETE CBC W/AUTO DIFF WBC: CPT

## 2025-02-26 PROCEDURE — 80047 BASIC METABLC PNL IONIZED CA: CPT

## 2025-02-26 PROCEDURE — 84100 ASSAY OF PHOSPHORUS: CPT

## 2025-02-26 PROCEDURE — 6360000002 HC RX W HCPCS: Performed by: INTERNAL MEDICINE

## 2025-02-26 PROCEDURE — 83735 ASSAY OF MAGNESIUM: CPT

## 2025-02-26 PROCEDURE — 96365 THER/PROPH/DIAG IV INF INIT: CPT

## 2025-02-26 PROCEDURE — 80053 COMPREHEN METABOLIC PANEL: CPT

## 2025-02-26 RX ORDER — SODIUM CHLORIDE 0.9 % (FLUSH) 0.9 %
5-40 SYRINGE (ML) INJECTION PRN
Status: DISCONTINUED | OUTPATIENT
Start: 2025-02-26 | End: 2025-02-27 | Stop reason: HOSPADM

## 2025-02-26 RX ADMIN — ZOLEDRONIC ACID 3.5 MG: 4 INJECTION, SOLUTION, CONCENTRATE INTRAVENOUS at 11:28

## 2025-02-26 RX ADMIN — SODIUM CHLORIDE, PRESERVATIVE FREE 10 ML: 5 INJECTION INTRAVENOUS at 12:33

## 2025-02-26 ASSESSMENT — PAIN SCALES - GENERAL: PAINLEVEL_OUTOF10: 10

## 2025-02-27 ENCOUNTER — HOSPITAL ENCOUNTER (OUTPATIENT)
Dept: WOMENS IMAGING | Age: 75
Discharge: HOME | End: 2025-02-27
Payer: MEDICARE

## 2025-02-27 VITALS — HEIGHT: 66 IN | WEIGHT: 175 LBS | BODY MASS INDEX: 28.12 KG/M2

## 2025-02-27 DIAGNOSIS — Z12.31 SCREENING MAMMOGRAM, ENCOUNTER FOR: ICD-10-CM

## 2025-02-27 PROCEDURE — 77063 BREAST TOMOSYNTHESIS BI: CPT

## 2025-03-03 RX ORDER — TRIAMCINOLONE ACETONIDE 1 MG/G
OINTMENT TOPICAL 2 TIMES DAILY
Qty: 60 G | Refills: 2 | Status: SHIPPED | OUTPATIENT
Start: 2025-03-03 | End: 2025-03-10

## 2025-03-07 RX ORDER — GABAPENTIN 100 MG/1
100 CAPSULE ORAL 3 TIMES DAILY
Qty: 90 CAPSULE | Refills: 0 | Status: SHIPPED | OUTPATIENT
Start: 2025-03-07 | End: 2025-04-06

## 2025-03-12 ENCOUNTER — HOSPITAL ENCOUNTER (OUTPATIENT)
Dept: INFUSION THERAPY | Age: 75
Discharge: HOME | End: 2025-03-12
Payer: MEDICARE

## 2025-03-12 DIAGNOSIS — C90.00 MULTIPLE MYELOMA NOT HAVING ACHIEVED REMISSION (HCC): ICD-10-CM

## 2025-03-12 LAB
ALBUMIN SERPL-MCNC: 3.5 G/DL (ref 3.4–5)
ALBUMIN/GLOB SERPL: 1.2 {RATIO} (ref 1.1–2.2)
ALP SERPL-CCNC: 85 U/L (ref 40–129)
ALT SERPL-CCNC: 14 U/L (ref 10–40)
ANION GAP SERPL CALCULATED.3IONS-SCNC: 7 MMOL/L (ref 9–17)
AST SERPL-CCNC: 18 U/L (ref 15–37)
BASOPHILS # BLD: 0.03 K/UL
BASOPHILS NFR BLD: 1 % (ref 0–1)
BILIRUB SERPL-MCNC: 0.4 MG/DL (ref 0–1)
BUN SERPL-MCNC: 13 MG/DL (ref 7–20)
CALCIUM SERPL-MCNC: 9.4 MG/DL (ref 8.3–10.6)
CHLORIDE SERPL-SCNC: 106 MMOL/L (ref 99–110)
CO2 SERPL-SCNC: 28 MMOL/L (ref 21–32)
CREAT SERPL-MCNC: 0.9 MG/DL (ref 0.6–1.2)
EOSINOPHIL # BLD: 0.44 K/UL
EOSINOPHILS RELATIVE PERCENT: 16 % (ref 0–3)
ERYTHROCYTE [DISTWIDTH] IN BLOOD BY AUTOMATED COUNT: 19.2 % (ref 11.7–14.9)
ERYTHROCYTE [SEDIMENTATION RATE] IN BLOOD BY WESTERGREN METHOD: 18 MM/HR (ref 0–30)
GFR, ESTIMATED: 64 ML/MIN/1.73M2
GLUCOSE SERPL-MCNC: 88 MG/DL (ref 74–99)
HCT VFR BLD AUTO: 28.6 % (ref 37–47)
HGB BLD-MCNC: 8.7 G/DL (ref 12.5–16)
IGA SERPL-MCNC: 529 MG/DL (ref 70–400)
IGG SERPL-MCNC: 1124 MG/DL (ref 700–1600)
IGM SERPL-MCNC: 52 MG/DL (ref 40–230)
LDH SERPL-CCNC: 172 U/L (ref 100–190)
LYMPHOCYTES NFR BLD: 0.72 K/UL
LYMPHOCYTES RELATIVE PERCENT: 26 % (ref 24–44)
MCH RBC QN AUTO: 28.7 PG (ref 27–31)
MCHC RBC AUTO-ENTMCNC: 30.4 G/DL (ref 32–36)
MCV RBC AUTO: 94.4 FL (ref 78–100)
MONOCYTES NFR BLD: 0.45 K/UL
MONOCYTES NFR BLD: 16 % (ref 0–4)
NEUTROPHILS NFR BLD: 41 % (ref 36–66)
NEUTS SEG NFR BLD: 1.12 K/UL
PLATELET # BLD AUTO: 157 K/UL (ref 140–440)
PMV BLD AUTO: 9.8 FL (ref 7.5–11.1)
POTASSIUM SERPL-SCNC: 4.3 MMOL/L (ref 3.5–5.1)
PROT SERPL-MCNC: 6.4 G/DL (ref 6.4–8.2)
RBC # BLD AUTO: 3.03 M/UL (ref 4.2–5.4)
SODIUM SERPL-SCNC: 141 MMOL/L (ref 136–145)
WBC OTHER # BLD: 2.8 K/UL (ref 4–10.5)

## 2025-03-12 PROCEDURE — 36415 COLL VENOUS BLD VENIPUNCTURE: CPT

## 2025-03-12 PROCEDURE — 86334 IMMUNOFIX E-PHORESIS SERUM: CPT

## 2025-03-12 PROCEDURE — 85025 COMPLETE CBC W/AUTO DIFF WBC: CPT

## 2025-03-12 PROCEDURE — 80053 COMPREHEN METABOLIC PANEL: CPT

## 2025-03-12 PROCEDURE — 84155 ASSAY OF PROTEIN SERUM: CPT

## 2025-03-12 PROCEDURE — 85652 RBC SED RATE AUTOMATED: CPT

## 2025-03-12 PROCEDURE — 84165 PROTEIN E-PHORESIS SERUM: CPT

## 2025-03-12 PROCEDURE — 82784 ASSAY IGA/IGD/IGG/IGM EACH: CPT

## 2025-03-12 PROCEDURE — 82232 ASSAY OF BETA-2 PROTEIN: CPT

## 2025-03-12 PROCEDURE — 83521 IG LIGHT CHAINS FREE EACH: CPT

## 2025-03-12 PROCEDURE — 83615 LACTATE (LD) (LDH) ENZYME: CPT

## 2025-03-13 ENCOUNTER — PREP FOR PROCEDURE (OUTPATIENT)
Dept: ONCOLOGY | Age: 75
End: 2025-03-13

## 2025-03-14 LAB
BETA-2 MICROGLOBULIN: 3.8 MG/L
COMMENT: ABNORMAL
KAPPA FREE LIGHT CHAIN: 76.9 MG/L (ref 2.37–20.73)
KAPPA/LAMBDA RATIO: 0.72 (ref 0.22–1.74)
LAMBDA FREE LIGHT CHAIN: 107 MG/L (ref 4.23–27.69)

## 2025-03-15 LAB
MISCELLANEOUS LAB TEST RESULT: NORMAL
MISCELLANEOUS LAB TEST RESULT: NORMAL
TEST NAME: NORMAL
TEST NAME: NORMAL

## 2025-03-19 ENCOUNTER — HOSPITAL ENCOUNTER (OUTPATIENT)
Dept: INFUSION THERAPY | Age: 75
Discharge: HOME | End: 2025-03-19
Payer: MEDICARE

## 2025-03-19 ENCOUNTER — CLINICAL DOCUMENTATION (OUTPATIENT)
Dept: ONCOLOGY | Age: 75
End: 2025-03-19

## 2025-03-19 ENCOUNTER — OFFICE VISIT (OUTPATIENT)
Dept: ONCOLOGY | Age: 75
End: 2025-03-19
Payer: MEDICARE

## 2025-03-19 VITALS
SYSTOLIC BLOOD PRESSURE: 158 MMHG | HEART RATE: 76 BPM | DIASTOLIC BLOOD PRESSURE: 59 MMHG | WEIGHT: 186.4 LBS | HEIGHT: 66 IN | BODY MASS INDEX: 29.96 KG/M2 | OXYGEN SATURATION: 100 % | TEMPERATURE: 97 F

## 2025-03-19 DIAGNOSIS — C90.00 MULTIPLE MYELOMA NOT HAVING ACHIEVED REMISSION (HCC): ICD-10-CM

## 2025-03-19 DIAGNOSIS — C90.00 MULTIPLE MYELOMA NOT HAVING ACHIEVED REMISSION (HCC): Primary | ICD-10-CM

## 2025-03-19 PROCEDURE — 1090F PRES/ABSN URINE INCON ASSESS: CPT | Performed by: INTERNAL MEDICINE

## 2025-03-19 PROCEDURE — 99212 OFFICE O/P EST SF 10 MIN: CPT

## 2025-03-19 PROCEDURE — 1159F MED LIST DOCD IN RCRD: CPT | Performed by: INTERNAL MEDICINE

## 2025-03-19 PROCEDURE — 99214 OFFICE O/P EST MOD 30 MIN: CPT | Performed by: INTERNAL MEDICINE

## 2025-03-19 PROCEDURE — 1036F TOBACCO NON-USER: CPT | Performed by: INTERNAL MEDICINE

## 2025-03-19 PROCEDURE — 1123F ACP DISCUSS/DSCN MKR DOCD: CPT | Performed by: INTERNAL MEDICINE

## 2025-03-19 PROCEDURE — G8417 CALC BMI ABV UP PARAM F/U: HCPCS | Performed by: INTERNAL MEDICINE

## 2025-03-19 PROCEDURE — G8400 PT W/DXA NO RESULTS DOC: HCPCS | Performed by: INTERNAL MEDICINE

## 2025-03-19 PROCEDURE — G8427 DOCREV CUR MEDS BY ELIG CLIN: HCPCS | Performed by: INTERNAL MEDICINE

## 2025-03-19 PROCEDURE — 3017F COLORECTAL CA SCREEN DOC REV: CPT | Performed by: INTERNAL MEDICINE

## 2025-03-19 RX ORDER — LENALIDOMIDE 10 MG/1
10 CAPSULE ORAL DAILY
Qty: 28 CAPSULE | Refills: 0 | Status: ACTIVE | OUTPATIENT
Start: 2025-03-19

## 2025-03-19 ASSESSMENT — PATIENT HEALTH QUESTIONNAIRE - PHQ9
SUM OF ALL RESPONSES TO PHQ QUESTIONS 1-9: 0
2. FEELING DOWN, DEPRESSED OR HOPELESS: NOT AT ALL
1. LITTLE INTEREST OR PLEASURE IN DOING THINGS: NOT AT ALL

## 2025-03-19 NOTE — PROGRESS NOTES
Revlimid 10 mg chemo capsules reordered and e-scribed to [facility] pharmacy. Prescriber survey completed and new auth # [id number] obtained through [employer] portal. Auth valid for 30 days and attached to RX.

## 2025-03-19 NOTE — PROGRESS NOTES
MA Rooming Questions  Patient: Marcia Bar  MRN: [id number]    Date: 3/19/2025        1. Do you have any new issues?   no         2. Do you need any refills on medications?    no    3. Have you had any imaging done since your last visit?   yes - mammogram 2/27    4. Have you been hospitalized or seen in the emergency room since your last visit here?   no    5. Did the patient have a depression screening completed today? Yes    PHQ-9 Total Score: 0 (3/19/2025  9:46 AM)       PHQ-9 Given to (if applicable):               PHQ-9 Score (if applicable):                     [] Positive     []  Negative              Does question #9 need addressed (if applicable)                     [] Yes    []  No               Elisabeth Alfaro CMA      
preparing to see the patient (review of tests/history), obtaining and/or reviewing separately obtained history, performing a medically appropriate evaluation, counseling and educating the patient and documenting clinical information in the electronic health record. This time also includes multiple disciplinary evaluation and management of cancer as documented.

## 2025-03-21 ENCOUNTER — CLINICAL DOCUMENTATION (OUTPATIENT)
Dept: ONCOLOGY | Age: 75
End: 2025-03-21

## 2025-03-21 NOTE — PROGRESS NOTES
This nurse called the patient @ [phone] to review lab results. However there was no answer. This nurse left a VM for the patient advising that the physician has reviewed her lab results and there was no M protein noted meaning that she is still in good clinical remission. This RN's direct number left if the patient has any additional questions or concerns.

## 2025-04-09 DIAGNOSIS — C90.00 MULTIPLE MYELOMA NOT HAVING ACHIEVED REMISSION (HCC): Primary | ICD-10-CM

## 2025-04-09 RX ORDER — DIPHENOXYLATE HYDROCHLORIDE AND ATROPINE SULFATE 2.5; .025 MG/1; MG/1
1 TABLET ORAL 4 TIMES DAILY PRN
Qty: 40 TABLET | Refills: 0 | Status: SHIPPED | OUTPATIENT
Start: 2025-04-09 | End: 2025-04-19

## 2025-04-09 NOTE — TELEPHONE ENCOUNTER
Received VM from patient requesting refill on antidiarrheal medication. RX for lomotil Q6 hours PRN pended for physician. Patient's pharmacy is [facility] on [address].

## 2025-04-15 ENCOUNTER — CLINICAL DOCUMENTATION (OUTPATIENT)
Dept: ONCOLOGY | Age: 75
End: 2025-04-15

## 2025-04-15 RX ORDER — NITROFURANTOIN 25; 75 MG/1; MG/1
100 CAPSULE ORAL 2 TIMES DAILY
Qty: 14 CAPSULE | Refills: 0 | Status: SHIPPED | OUTPATIENT
Start: 2025-04-15 | End: 2025-04-22

## 2025-04-15 NOTE — PROGRESS NOTES
Received VM from patient with c/o start of UTI. Patient requesting ATB before it gets out of control. Physician notified. RX for macrobid 100 mg BID x7 days e-scribed to [facility] pharmacy on [address] per physician order. Attempted to call patient @ [phone] to update; however, no answer. VM left with this RN direct number should patient have any questions.

## 2025-05-02 ENCOUNTER — CLINICAL DOCUMENTATION (OUTPATIENT)
Dept: ONCOLOGY | Age: 75
End: 2025-05-02

## 2025-05-02 RX ORDER — LENALIDOMIDE 10 MG/1
CAPSULE ORAL
Qty: 21 CAPSULE | Refills: 0 | Status: SHIPPED | OUTPATIENT
Start: 2025-05-02

## 2025-05-02 NOTE — PROGRESS NOTES
Received VM from patient stating she needs a new prescription sent in for Revlimid since frequency changing from daily Q28D to 21D on then 7D off Q28D as confirmed with physician. Revlimid 10 mg chemo capsules ordered and e-scribed to [facility] pharmacy. Prescriber survey completed and new auth # [id number] obtained through [employer] portal. Auth valid for 30 days and attached to RX.

## 2025-05-05 RX ORDER — LENALIDOMIDE 10 MG/1
CAPSULE ORAL
Qty: 21 CAPSULE | Refills: 0 | Status: ACTIVE | OUTPATIENT
Start: 2025-05-05

## 2025-06-02 RX ORDER — LENALIDOMIDE 10 MG/1
CAPSULE ORAL
Qty: 21 CAPSULE | Refills: 0 | Status: ACTIVE | OUTPATIENT
Start: 2025-06-02

## 2025-06-02 RX ORDER — LENALIDOMIDE 10 MG/1
CAPSULE ORAL
Qty: 21 CAPSULE | Refills: 0 | Status: SHIPPED | OUTPATIENT
Start: 2025-06-02 | End: 2025-06-02 | Stop reason: SDUPTHER

## 2025-06-02 NOTE — PROGRESS NOTES
Revlimid 10 mg chemo capsules reordered and sent to [facility] pharmacy. Prescriber survey completed and new auth # [id number] obtained through [employer] portal. Auth valid for 30 days and attached to RX.

## 2025-06-11 ENCOUNTER — HOSPITAL ENCOUNTER (OUTPATIENT)
Dept: INFUSION THERAPY | Age: 75
Discharge: HOME | End: 2025-06-11
Payer: MEDICARE

## 2025-06-11 DIAGNOSIS — C90.00 MULTIPLE MYELOMA NOT HAVING ACHIEVED REMISSION (HCC): ICD-10-CM

## 2025-06-11 LAB
ALBUMIN SERPL-MCNC: 3.6 G/DL (ref 3.4–5)
ALBUMIN/GLOB SERPL: 1.2 {RATIO} (ref 1.1–2.2)
ALP SERPL-CCNC: 101 U/L (ref 40–129)
ALT SERPL-CCNC: 30 U/L (ref 10–40)
ANION GAP SERPL CALCULATED.3IONS-SCNC: 9 MMOL/L (ref 9–17)
AST SERPL-CCNC: 26 U/L (ref 15–37)
BASOPHILS # BLD: 0.02 K/UL
BASOPHILS NFR BLD: 1 % (ref 0–1)
BILIRUB SERPL-MCNC: 0.5 MG/DL (ref 0–1)
BUN SERPL-MCNC: 20 MG/DL (ref 7–20)
CALCIUM SERPL-MCNC: 9.1 MG/DL (ref 8.3–10.6)
CHLORIDE SERPL-SCNC: 105 MMOL/L (ref 99–110)
CO2 SERPL-SCNC: 24 MMOL/L (ref 21–32)
CREAT SERPL-MCNC: 0.9 MG/DL (ref 0.6–1.2)
EOSINOPHIL # BLD: 0.17 K/UL
EOSINOPHILS RELATIVE PERCENT: 8 % (ref 0–3)
ERYTHROCYTE [DISTWIDTH] IN BLOOD BY AUTOMATED COUNT: 17.4 % (ref 11.7–14.9)
ERYTHROCYTE [SEDIMENTATION RATE] IN BLOOD BY WESTERGREN METHOD: 9 MM/HR (ref 0–30)
GFR, ESTIMATED: 58 ML/MIN/1.73M2
GLUCOSE SERPL-MCNC: 83 MG/DL (ref 74–99)
HCT VFR BLD AUTO: 30.2 % (ref 37–47)
HGB BLD-MCNC: 9.5 G/DL (ref 12.5–16)
IGA SERPL-MCNC: 490 MG/DL (ref 70–400)
IGG SERPL-MCNC: 1151 MG/DL (ref 700–1600)
IGM SERPL-MCNC: 40 MG/DL (ref 40–230)
LDH SERPL-CCNC: 222 U/L (ref 100–190)
LYMPHOCYTES NFR BLD: 0.96 K/UL
LYMPHOCYTES RELATIVE PERCENT: 42 % (ref 24–44)
MCH RBC QN AUTO: 29.5 PG (ref 27–31)
MCHC RBC AUTO-ENTMCNC: 31.5 G/DL (ref 32–36)
MCV RBC AUTO: 93.8 FL (ref 78–100)
MONOCYTES NFR BLD: 0.35 K/UL
MONOCYTES NFR BLD: 15 % (ref 0–5)
NEUTROPHILS NFR BLD: 34 % (ref 36–66)
NEUTS SEG NFR BLD: 0.77 K/UL
PLATELET # BLD AUTO: 119 K/UL (ref 140–440)
PMV BLD AUTO: 11.3 FL (ref 7.5–11.1)
POTASSIUM SERPL-SCNC: 4.4 MMOL/L (ref 3.5–5.1)
PROT SERPL-MCNC: 6.6 G/DL (ref 6.4–8.2)
RBC # BLD AUTO: 3.22 M/UL (ref 4.2–5.4)
SODIUM SERPL-SCNC: 138 MMOL/L (ref 136–145)
WBC OTHER # BLD: 2.3 K/UL (ref 4–10.5)

## 2025-06-11 PROCEDURE — 83615 LACTATE (LD) (LDH) ENZYME: CPT

## 2025-06-11 PROCEDURE — 85025 COMPLETE CBC W/AUTO DIFF WBC: CPT

## 2025-06-11 PROCEDURE — 84155 ASSAY OF PROTEIN SERUM: CPT

## 2025-06-11 PROCEDURE — 86334 IMMUNOFIX E-PHORESIS SERUM: CPT

## 2025-06-11 PROCEDURE — 80053 COMPREHEN METABOLIC PANEL: CPT

## 2025-06-11 PROCEDURE — 84165 PROTEIN E-PHORESIS SERUM: CPT

## 2025-06-11 PROCEDURE — 83521 IG LIGHT CHAINS FREE EACH: CPT

## 2025-06-11 PROCEDURE — 36415 COLL VENOUS BLD VENIPUNCTURE: CPT

## 2025-06-11 PROCEDURE — 85652 RBC SED RATE AUTOMATED: CPT

## 2025-06-11 PROCEDURE — 82784 ASSAY IGA/IGD/IGG/IGM EACH: CPT

## 2025-06-13 LAB
BETA-2 MICROGLOBULIN: 3.2 MG/L
COMMENT: ABNORMAL
KAPPA FREE LIGHT CHAIN: 73.2 MG/L (ref 2.37–20.73)
KAPPA/LAMBDA RATIO: 0.5 (ref 0.22–1.74)
LAMBDA FREE LIGHT CHAIN: 146 MG/L (ref 4.23–27.69)

## 2025-06-16 DIAGNOSIS — C90.00 MULTIPLE MYELOMA NOT HAVING ACHIEVED REMISSION (HCC): ICD-10-CM

## 2025-06-16 RX ORDER — METOPROLOL SUCCINATE 25 MG/1
25 TABLET, EXTENDED RELEASE ORAL DAILY
Qty: 90 TABLET | Refills: 1 | Status: SHIPPED | OUTPATIENT
Start: 2025-06-16

## 2025-06-25 ENCOUNTER — HOSPITAL ENCOUNTER (OUTPATIENT)
Dept: INFUSION THERAPY | Age: 75
Discharge: HOME | End: 2025-06-25
Payer: MEDICARE

## 2025-06-25 ENCOUNTER — OFFICE VISIT (OUTPATIENT)
Dept: ONCOLOGY | Age: 75
End: 2025-06-25
Payer: MEDICARE

## 2025-06-25 VITALS
BODY MASS INDEX: 29.18 KG/M2 | HEART RATE: 58 BPM | WEIGHT: 181.6 LBS | OXYGEN SATURATION: 99 % | TEMPERATURE: 97.6 F | SYSTOLIC BLOOD PRESSURE: 175 MMHG | HEIGHT: 66 IN | DIASTOLIC BLOOD PRESSURE: 67 MMHG

## 2025-06-25 DIAGNOSIS — C90.00 MULTIPLE MYELOMA NOT HAVING ACHIEVED REMISSION (HCC): Primary | ICD-10-CM

## 2025-06-25 PROCEDURE — 99214 OFFICE O/P EST MOD 30 MIN: CPT | Performed by: INTERNAL MEDICINE

## 2025-06-25 PROCEDURE — G8417 CALC BMI ABV UP PARAM F/U: HCPCS | Performed by: INTERNAL MEDICINE

## 2025-06-25 PROCEDURE — 1036F TOBACCO NON-USER: CPT | Performed by: INTERNAL MEDICINE

## 2025-06-25 PROCEDURE — 1090F PRES/ABSN URINE INCON ASSESS: CPT | Performed by: INTERNAL MEDICINE

## 2025-06-25 PROCEDURE — 1159F MED LIST DOCD IN RCRD: CPT | Performed by: INTERNAL MEDICINE

## 2025-06-25 PROCEDURE — G8400 PT W/DXA NO RESULTS DOC: HCPCS | Performed by: INTERNAL MEDICINE

## 2025-06-25 PROCEDURE — 3017F COLORECTAL CA SCREEN DOC REV: CPT | Performed by: INTERNAL MEDICINE

## 2025-06-25 PROCEDURE — 99212 OFFICE O/P EST SF 10 MIN: CPT

## 2025-06-25 PROCEDURE — G8427 DOCREV CUR MEDS BY ELIG CLIN: HCPCS | Performed by: INTERNAL MEDICINE

## 2025-06-25 PROCEDURE — 1126F AMNT PAIN NOTED NONE PRSNT: CPT | Performed by: INTERNAL MEDICINE

## 2025-06-25 PROCEDURE — 1123F ACP DISCUSS/DSCN MKR DOCD: CPT | Performed by: INTERNAL MEDICINE

## 2025-06-25 RX ORDER — GABAPENTIN 100 MG/1
100 CAPSULE ORAL 3 TIMES DAILY
Qty: 90 CAPSULE | Refills: 0 | Status: SHIPPED | OUTPATIENT
Start: 2025-06-25 | End: 2025-07-25

## 2025-06-25 NOTE — PROGRESS NOTES
MA/LPN Rooming Questions  Patient: Marcia Bar  MRN: [id number]    Date: 6/25/2025        1. Do you have any new issues?   no         2. Do you need any refills on medications?    no    3. Have you had any imaging done since your last visit?   Yes- mammo 2/27    4. Have you been hospitalized or seen in the emergency room since your last visit here?   no    5. Did the patient have a depression screening completed today? No    No data recorded     PHQ-9 Given to (if applicable):               PHQ-9 Score (if applicable):                     [] Positive     []  Negative              Does question #9 need addressed (if applicable)                     [] Yes    []  No               Margarita Faust MA      
(L) 06/11/2025    MPV 11.3 (H) 06/11/2025    BANDSPCT 5 03/08/2017    BASOPCT 1 06/11/2025    LYMPHOPCT 42 06/11/2025    MONOPCT 15 (H) 06/11/2025    BANDABS 0.12 03/08/2017    EOSABS 0.17 06/11/2025    BASOSABS 0.02 06/11/2025    LYMPHSABS 0.96 06/11/2025    MONOSABS 0.35 06/11/2025    DIFFTYPE AUTOMATED DIFFERENTIAL 06/05/2024     No results found for: \"ESR\"    Chemistry:  Lab Results   Component Value Date     06/11/2025    K 4.4 06/11/2025     06/11/2025    CO2 24 06/11/2025    BUN 20 06/11/2025    CREATININE 0.9 06/11/2025    GLUCOSE 83 06/11/2025    CALCIUM 9.1 06/11/2025    LABALBU 100 09/30/2015    BILITOT 0.5 06/11/2025    ALKPHOS 101 06/11/2025    AST 26 06/11/2025    ALT 30 06/11/2025    LABGLOM 58 (L) 06/11/2025    GFRAA >60 09/28/2022    PHOS 3.5 02/26/2025    MG 1.9 02/26/2025    POCCA 1.22 08/14/2024    POCGLU 86 02/26/2025     Lab Results   Component Value Date     (H) 06/11/2025     No results found for: \"LD\"  Lab Results   Component Value Date    TSHHS 1.580 11/01/2023     Immunology:  Lab Results   Component Value Date    SPEP  02/07/2024     INTERPRETATION - Decreased albumin and total proteins.  A definitive M spike is not seen.  However, the concurrent KAUSHIK shows a possible faint free lambda light chain protein.  LP.    ALBUMINELP 2.9 (L) 02/07/2024    GAMGLOB SENT TO REFERENCE LAB 09/11/2024     Lab Results   Component Value Date    KAPPAUVOL 81.08 (H) 06/05/2024    LAMBDAUVOL 67.21 (H) 11/11/2020    KLFLCR 1.54 06/05/2024     Lab Results   Component Value Date    B2M 3.2 (H) 06/11/2025     Coagulation Panel:  Lab Results   Component Value Date    PROTIME 12.8 (H) 10/31/2018    INR 1.12 10/31/2018    APTT 29.7 10/31/2018    DDIMER <200 05/17/2017     Anemia Panel:  Lab Results   Component Value Date    UIGFPLHK69 >2000 (H) 11/01/2023    FOLATE >20.0 (H) 11/01/2023     Tumor Markers:  No results found for: \"\", \"CEA\", \"\", \"LABCA2\", \"PSA\"  Observations:  No data

## 2025-07-07 ENCOUNTER — CLINICAL DOCUMENTATION (OUTPATIENT)
Dept: ONCOLOGY | Age: 75
End: 2025-07-07

## 2025-07-07 RX ORDER — LENALIDOMIDE 10 MG/1
CAPSULE ORAL
Qty: 21 CAPSULE | Refills: 0 | Status: ACTIVE | OUTPATIENT
Start: 2025-07-07

## 2025-07-07 NOTE — PROGRESS NOTES
Revlimid 10 mg chemo capsules reordered and sent to [facility] pharmacy. Prescriber survey completed and new auth # [id number]  obtained through [employer] portal. Auth valid for 30 days and attached to RX.

## 2025-08-04 ENCOUNTER — CLINICAL DOCUMENTATION (OUTPATIENT)
Dept: ONCOLOGY | Age: 75
End: 2025-08-04

## 2025-08-04 RX ORDER — LENALIDOMIDE 10 MG/1
CAPSULE ORAL
Qty: 21 CAPSULE | Refills: 0 | Status: ACTIVE | OUTPATIENT
Start: 2025-08-04

## 2025-08-25 ENCOUNTER — CLINICAL DOCUMENTATION (OUTPATIENT)
Dept: INFUSION THERAPY | Age: 75
End: 2025-08-25

## 2025-08-25 DIAGNOSIS — C90.00 MULTIPLE MYELOMA NOT HAVING ACHIEVED REMISSION (HCC): Primary | ICD-10-CM

## 2025-08-25 RX ORDER — FAMOTIDINE 10 MG/ML
20 INJECTION, SOLUTION INTRAVENOUS
Status: CANCELLED | OUTPATIENT
Start: 2025-08-27

## 2025-08-25 RX ORDER — SODIUM CHLORIDE 9 MG/ML
5-250 INJECTION, SOLUTION INTRAVENOUS PRN
Status: CANCELLED | OUTPATIENT
Start: 2025-08-27

## 2025-08-25 RX ORDER — EPINEPHRINE 1 MG/ML
0.3 INJECTION, SOLUTION, CONCENTRATE INTRAVENOUS PRN
Status: CANCELLED | OUTPATIENT
Start: 2025-08-27

## 2025-08-25 RX ORDER — SODIUM CHLORIDE 0.9 % (FLUSH) 0.9 %
5-40 SYRINGE (ML) INJECTION PRN
Status: CANCELLED | OUTPATIENT
Start: 2025-08-27

## 2025-08-25 RX ORDER — ALBUTEROL SULFATE 90 UG/1
4 INHALANT RESPIRATORY (INHALATION) PRN
Status: CANCELLED | OUTPATIENT
Start: 2025-08-27

## 2025-08-25 RX ORDER — ONDANSETRON 2 MG/ML
8 INJECTION INTRAMUSCULAR; INTRAVENOUS
Status: CANCELLED | OUTPATIENT
Start: 2025-08-27

## 2025-08-25 RX ORDER — DIPHENHYDRAMINE HYDROCHLORIDE 50 MG/ML
50 INJECTION, SOLUTION INTRAMUSCULAR; INTRAVENOUS
Status: CANCELLED | OUTPATIENT
Start: 2025-08-27

## 2025-08-25 RX ORDER — HYDROCORTISONE SODIUM SUCCINATE 100 MG/2ML
100 INJECTION INTRAMUSCULAR; INTRAVENOUS
Status: CANCELLED | OUTPATIENT
Start: 2025-08-27

## 2025-08-25 RX ORDER — MEPERIDINE HYDROCHLORIDE 50 MG/ML
12.5 INJECTION INTRAMUSCULAR; INTRAVENOUS; SUBCUTANEOUS PRN
Status: CANCELLED | OUTPATIENT
Start: 2025-08-27

## 2025-08-25 RX ORDER — ZOLEDRONIC ACID 0.04 MG/ML
4 INJECTION, SOLUTION INTRAVENOUS ONCE
Status: CANCELLED | OUTPATIENT
Start: 2025-08-27 | End: 2025-08-27

## 2025-08-25 RX ORDER — ACETAMINOPHEN 325 MG/1
650 TABLET ORAL
Status: CANCELLED | OUTPATIENT
Start: 2025-08-27

## 2025-08-25 RX ORDER — SODIUM CHLORIDE 9 MG/ML
INJECTION, SOLUTION INTRAVENOUS CONTINUOUS
Status: CANCELLED | OUTPATIENT
Start: 2025-08-27

## 2025-08-25 RX ORDER — HEPARIN SODIUM (PORCINE) LOCK FLUSH IV SOLN 100 UNIT/ML 100 UNIT/ML
500 SOLUTION INTRAVENOUS PRN
Status: CANCELLED | OUTPATIENT
Start: 2025-08-27

## 2025-08-27 ENCOUNTER — HOSPITAL ENCOUNTER (OUTPATIENT)
Dept: INFUSION THERAPY | Age: 75
Discharge: HOME | End: 2025-08-27
Payer: MEDICARE

## 2025-08-27 VITALS
HEART RATE: 59 BPM | HEIGHT: 66 IN | DIASTOLIC BLOOD PRESSURE: 45 MMHG | OXYGEN SATURATION: 100 % | TEMPERATURE: 97.8 F | SYSTOLIC BLOOD PRESSURE: 129 MMHG | BODY MASS INDEX: 29.15 KG/M2 | WEIGHT: 181.4 LBS

## 2025-08-27 DIAGNOSIS — C90.00 MULTIPLE MYELOMA NOT HAVING ACHIEVED REMISSION (HCC): Primary | ICD-10-CM

## 2025-08-27 LAB
ALBUMIN SERPL-MCNC: 3.5 G/DL (ref 3.4–5)
ALBUMIN/GLOB SERPL: 1.1 {RATIO} (ref 1.1–2.2)
ALP SERPL-CCNC: 118 U/L (ref 40–129)
ALT SERPL-CCNC: 13 U/L (ref 10–40)
ANION GAP SERPL CALCULATED.3IONS-SCNC: 10 MMOL/L (ref 9–17)
AST SERPL-CCNC: 18 U/L (ref 15–37)
BASOPHILS # BLD: 0.01 K/UL
BASOPHILS NFR BLD: 0 % (ref 0–1)
BILIRUB SERPL-MCNC: 0.4 MG/DL (ref 0–1)
BUN BLD-MCNC: 22 MG/DL (ref 7–20)
BUN SERPL-MCNC: 24 MG/DL (ref 7–20)
CA-I BLD-SCNC: 1.17 MMOL/L (ref 1.15–1.33)
CALCIUM SERPL-MCNC: 8.7 MG/DL (ref 8.3–10.6)
CHLORIDE BLD-SCNC: 105 MMOL/L (ref 99–110)
CHLORIDE SERPL-SCNC: 103 MMOL/L (ref 99–110)
CO2 BLD CALC-SCNC: 27 MMOL/L (ref 21–32)
CO2 SERPL-SCNC: 24 MMOL/L (ref 21–32)
CREAT BLD-MCNC: 1.2 MG/DL (ref 0.5–1.2)
CREAT SERPL-MCNC: 1.1 MG/DL (ref 0.6–1.2)
EGFR, POC: 48 ML/MIN/1.73M2
EOSINOPHIL # BLD: 0.42 K/UL
EOSINOPHILS RELATIVE PERCENT: 10 % (ref 0–3)
ERYTHROCYTE [DISTWIDTH] IN BLOOD BY AUTOMATED COUNT: 17.1 % (ref 11.7–14.9)
ERYTHROCYTE [SEDIMENTATION RATE] IN BLOOD BY WESTERGREN METHOD: 32 MM/HR (ref 0–30)
GFR, ESTIMATED: 50 ML/MIN/1.73M2
GLUCOSE BLD-MCNC: 94 MG/DL (ref 74–99)
GLUCOSE SERPL-MCNC: 86 MG/DL (ref 74–99)
HCT VFR BLD AUTO: 30.2 % (ref 37–47)
HGB BLD-MCNC: 9.5 G/DL (ref 12.5–16)
IGA SERPL-MCNC: 489 MG/DL (ref 70–400)
IGG SERPL-MCNC: 1044 MG/DL (ref 700–1600)
IGM SERPL-MCNC: 36 MG/DL (ref 40–230)
LDH SERPL-CCNC: 189 U/L (ref 100–190)
LYMPHOCYTES NFR BLD: 0.55 K/UL
LYMPHOCYTES RELATIVE PERCENT: 13 % (ref 24–44)
MAGNESIUM SERPL-MCNC: 2.2 MG/DL (ref 1.8–2.4)
MCH RBC QN AUTO: 29.8 PG (ref 27–31)
MCHC RBC AUTO-ENTMCNC: 31.5 G/DL (ref 32–36)
MCV RBC AUTO: 94.7 FL (ref 78–100)
MONOCYTES NFR BLD: 0.61 K/UL
MONOCYTES NFR BLD: 14 % (ref 0–5)
NEUTROPHILS NFR BLD: 63 % (ref 36–66)
NEUTS SEG NFR BLD: 2.67 K/UL
PHOSPHATE SERPL-MCNC: 3 MG/DL (ref 2.5–4.9)
PLATELET # BLD AUTO: 182 K/UL (ref 140–440)
PMV BLD AUTO: 10.3 FL (ref 7.5–11.1)
POTASSIUM BLD-SCNC: 4.3 MMOL/L (ref 3.5–5.1)
POTASSIUM SERPL-SCNC: 4.3 MMOL/L (ref 3.5–5.1)
PROT SERPL-MCNC: 6.8 G/DL (ref 6.4–8.2)
RBC # BLD AUTO: 3.19 M/UL (ref 4.2–5.4)
SODIUM BLD-SCNC: 140 MMOL/L (ref 136–145)
SODIUM SERPL-SCNC: 136 MMOL/L (ref 136–145)
WBC OTHER # BLD: 4.3 K/UL (ref 4–10.5)

## 2025-08-27 PROCEDURE — 82232 ASSAY OF BETA-2 PROTEIN: CPT

## 2025-08-27 PROCEDURE — 84155 ASSAY OF PROTEIN SERUM: CPT

## 2025-08-27 PROCEDURE — 80047 BASIC METABLC PNL IONIZED CA: CPT

## 2025-08-27 PROCEDURE — 96365 THER/PROPH/DIAG IV INF INIT: CPT

## 2025-08-27 PROCEDURE — 83521 IG LIGHT CHAINS FREE EACH: CPT

## 2025-08-27 PROCEDURE — 80053 COMPREHEN METABOLIC PANEL: CPT

## 2025-08-27 PROCEDURE — 36415 COLL VENOUS BLD VENIPUNCTURE: CPT

## 2025-08-27 PROCEDURE — 83735 ASSAY OF MAGNESIUM: CPT

## 2025-08-27 PROCEDURE — 2580000003 HC RX 258: Performed by: INTERNAL MEDICINE

## 2025-08-27 PROCEDURE — 82784 ASSAY IGA/IGD/IGG/IGM EACH: CPT

## 2025-08-27 PROCEDURE — 85652 RBC SED RATE AUTOMATED: CPT

## 2025-08-27 PROCEDURE — 86334 IMMUNOFIX E-PHORESIS SERUM: CPT

## 2025-08-27 PROCEDURE — 84100 ASSAY OF PHOSPHORUS: CPT

## 2025-08-27 PROCEDURE — 84165 PROTEIN E-PHORESIS SERUM: CPT

## 2025-08-27 PROCEDURE — 83615 LACTATE (LD) (LDH) ENZYME: CPT

## 2025-08-27 PROCEDURE — 6360000002 HC RX W HCPCS: Performed by: INTERNAL MEDICINE

## 2025-08-27 PROCEDURE — 85025 COMPLETE CBC W/AUTO DIFF WBC: CPT

## 2025-08-27 RX ORDER — SODIUM CHLORIDE 9 MG/ML
5-250 INJECTION, SOLUTION INTRAVENOUS PRN
Status: DISCONTINUED | OUTPATIENT
Start: 2025-08-27 | End: 2025-08-28 | Stop reason: HOSPADM

## 2025-08-27 RX ORDER — SODIUM CHLORIDE 0.9 % (FLUSH) 0.9 %
5-40 SYRINGE (ML) INJECTION PRN
Status: DISCONTINUED | OUTPATIENT
Start: 2025-08-27 | End: 2025-08-28 | Stop reason: HOSPADM

## 2025-08-27 RX ADMIN — ZOLEDRONIC ACID 3.3 MG: 4 INJECTION, SOLUTION, CONCENTRATE INTRAVENOUS at 07:59

## 2025-08-28 LAB
BETA-2 MICROGLOBULIN: 4.5 MG/L
COMMENT: ABNORMAL
KAPPA FREE LIGHT CHAIN: 179 MG/L (ref 2.37–20.73)
KAPPA/LAMBDA RATIO: 0.54 (ref 0.22–1.74)
LAMBDA FREE LIGHT CHAIN: 331 MG/L (ref 4.23–27.69)

## 2025-09-02 RX ORDER — LENALIDOMIDE 10 MG/1
CAPSULE ORAL
Qty: 21 CAPSULE | Refills: 0 | Status: SHIPPED | OUTPATIENT
Start: 2025-09-02 | End: 2025-09-02 | Stop reason: SDUPTHER

## 2025-09-02 RX ORDER — LENALIDOMIDE 10 MG/1
CAPSULE ORAL
Qty: 21 CAPSULE | Refills: 0 | Status: ACTIVE | OUTPATIENT
Start: 2025-09-02
